# Patient Record
Sex: FEMALE | Race: WHITE | NOT HISPANIC OR LATINO | Employment: UNEMPLOYED | URBAN - METROPOLITAN AREA
[De-identification: names, ages, dates, MRNs, and addresses within clinical notes are randomized per-mention and may not be internally consistent; named-entity substitution may affect disease eponyms.]

---

## 2018-04-01 ENCOUNTER — TRANSFERRED RECORDS (OUTPATIENT)
Dept: HEALTH INFORMATION MANAGEMENT | Facility: CLINIC | Age: 43
End: 2018-04-01

## 2018-04-01 LAB — PAP SMEAR - HIM PATIENT REPORTED: POSITIVE

## 2018-04-16 NOTE — PROGRESS NOTES
INTERNAL MEDICINE  ASSESSMENT/PLAN:   1. Intermittent right upper quadrant abdominal pain  Her history sounds like biliary colic but it is so intermittent and over a stretch of 5 years that this makes it less likely.  Has history of alcoholism but had serology and ultrasound that were negative in the past.  Will review past records, ask patient to keep food diary and consider further imaging of the right upper quadrant if needed after review of records.      2. Encounter for screening mammogram for breast cancer    - Hepatitis B Surface Antibody  - TDAP VACCINE (ADACEL)  - MA Screen Bilateral w/Maicol; Future    3. Biliary sludge determined by ultrasound  Per above     4. Encounter for contraceptive management, unspecified type  Doesn't want hormones and will check in to copper IUD    Patient Instructions  Consider getting a copper IUD.    Write down what foods you ate before a flare up.    I'll review your [facility] records.    Schedule your mammogram.    I'll get back to your about whether you need a bone density due to the Depo use.        SUBJECTIVE:   Shala Morales is a 43 year old female who presents to clinic today for the following health issues:    Patient presents to clinic today to establish care.    Gallbladder - Her gallbladder has been flaring up intermittently since 2013. Around that time, she had an US and consult with a surgeon. They told her there was sludge in her gallbladder and that she could decide whether to get a cholecystectomy or wait until it was more urgent. Since then, it seemed like her symptoms worsened with her monthly periods. It has been 4-5 months since she last had a flare.     Her symptoms present over a period of days like a stomachache which worsens and radiates to under her ribs. It usually occurs later in the day and will wake her up at night, making it difficult for her to find a comfortable position to lay down. Describes pain as burning and has abdominal tenderness. Notes  she has had shoulder pain.    She cut out fat from her diet and has been eating a vegan diet. Drinking mochas seemed to cause flares but overall she cannot find any correlation between specific foods or quantities. When she has flares, her bowel movements and urination are unchanged in color or consistency. Her pain has never made her vomit.     Anemia - She has always been slightly anemic. In January, her provider started her on Vitron C 65mg twice daily because she was feeling fatigued. She did not like taking it twice daily so she decreased to once daily in February.     Perimenopause - Her mother was perimenopause in her 40s and patient has gotten hot flashes and missed her period for a 3 month period. Denies pregnancy. Her periods progressed from short and heavy, to short. Her LMP was last month. She was on Depo Provera for 10 years.      HPV - Last week, she had a LEEP procedure done at [facility] which came back negative. She has had abnormal biopsies in the past.     Hypertension - She had high blood pressure when she was an alcoholic. Got her liver tested and there was no liver damage. She has been sober for 6 years and has not taken anything for her blood pressure since.     Depression - She has a history of depression and was on antidepressants but it is well-controlled now. Has not been on Celexa for 5 years.    Additional notes:   Family history of prostate cancer  Works as an LPN at [employer]      Problem list and histories reviewed & adjusted, as indicated.  Additional history: as documented    Labs reviewed in EPIC    Reviewed and updated as needed this visit by clinical staff  Tobacco  Allergies  Meds  Med Hx  Surg Hx  Fam Hx  Soc Hx      Reviewed and updated as needed this visit by Provider         ROS:  CONSTITUTIONAL: NEGATIVE for fever, chills, change in weight  GI: NEGATIVE for nausea, abdominal pain, heartburn, or change in bowel habits  : NEGATIVE for frequency, dysuria, or  "hematuria  MUSCULOSKELETAL: NEGATIVE for significant arthralgias or myalgia  NEURO: NEGATIVE for weakness, dizziness or paresthesias  PSYCHIATRIC: NEGATIVE for changes in mood or affect    This document serves as a record of the services and decisions personally performed and made by Gwen Lewis MD. It was created on his/her behalf by giovani Bo medical scribe. The creation of this document is based the provider's statements to the medical scribes.    Shavonne Rebollar 3:19 PM, April 17, 2018  OBJECTIVE:   /78  Pulse 71  Temp 98.4  F (36.9  C) (Oral)  Resp 17  Ht 1.74 m (5' 8.5\")  Wt 71.4 kg (157 lb 8 oz)  SpO2 100%  BMI 23.6 kg/m2  Body mass index is 23.6 kg/(m^2).  GENERAL: healthy, alert and no distress  NECK: no adenopathy, no asymmetry, masses, or scars and thyroid normal to palpation  RESP: lungs clear to auscultation - no rales, rhonchi or wheezes  CV: regular rate and rhythm, normal S1 S2, no S3 or S4, no murmur, click or rub, no peripheral edema and peripheral pulses strong  ABDOMEN: soft, nontender, no hepatosplenomegaly, no masses and bowel sounds normal  NEURO: Normal strength and tone, mentation intact and speech normal  PSYCH: mentation appears normal, affect normal/bright    Diagnostic Test Results:  No results found for this or any previous visit.      I spent 38 minutes of time with the patient and >50% of it was in education and counseling regarding gallbladder.    The information in this document, created by the medical scribe, Eyad Rebollar, for me, accurately reflects the services I personally performed and the decisions made by me. I have reviewed and approved this document for accuracy prior to leaving the patient care area.    Gwen Lewis MD  [facility]    Start 3:18 PM  End 3:56 PM  "

## 2018-04-17 ENCOUNTER — OFFICE VISIT (OUTPATIENT)
Dept: INTERNAL MEDICINE | Facility: CLINIC | Age: 43
End: 2018-04-17
Payer: COMMERCIAL

## 2018-04-17 VITALS
HEART RATE: 71 BPM | HEIGHT: 69 IN | SYSTOLIC BLOOD PRESSURE: 122 MMHG | TEMPERATURE: 98.4 F | BODY MASS INDEX: 23.33 KG/M2 | WEIGHT: 157.5 LBS | DIASTOLIC BLOOD PRESSURE: 78 MMHG | OXYGEN SATURATION: 100 % | RESPIRATION RATE: 17 BRPM

## 2018-04-17 DIAGNOSIS — K83.8 BILIARY SLUDGE DETERMINED BY ULTRASOUND: ICD-10-CM

## 2018-04-17 DIAGNOSIS — R10.11 INTERMITTENT RIGHT UPPER QUADRANT ABDOMINAL PAIN: Primary | ICD-10-CM

## 2018-04-17 DIAGNOSIS — Z23 NEED FOR TDAP VACCINATION: ICD-10-CM

## 2018-04-17 DIAGNOSIS — Z30.9 ENCOUNTER FOR CONTRACEPTIVE MANAGEMENT, UNSPECIFIED TYPE: ICD-10-CM

## 2018-04-17 DIAGNOSIS — Z12.31 ENCOUNTER FOR SCREENING MAMMOGRAM FOR BREAST CANCER: ICD-10-CM

## 2018-04-17 DIAGNOSIS — Z78.9 HEPATITIS B VACCINATION STATUS UNKNOWN: ICD-10-CM

## 2018-04-17 PROBLEM — Z86.59 HISTORY OF DEPRESSION: Status: ACTIVE | Noted: 2018-04-17

## 2018-04-17 PROBLEM — B97.7 HPV IN FEMALE: Status: ACTIVE | Noted: 2018-04-17

## 2018-04-17 PROBLEM — Z86.79 HISTORY OF HYPERTENSION: Status: ACTIVE | Noted: 2018-04-17

## 2018-04-17 PROBLEM — F10.21 HISTORY OF ALCOHOLISM (H): Status: ACTIVE | Noted: 2018-04-17

## 2018-04-17 PROBLEM — N95.1 SYMPTOMATIC MENOPAUSAL OR FEMALE CLIMACTERIC STATES: Status: ACTIVE | Noted: 2018-04-17

## 2018-04-17 PROCEDURE — 90471 IMMUNIZATION ADMIN: CPT | Performed by: INTERNAL MEDICINE

## 2018-04-17 PROCEDURE — 86706 HEP B SURFACE ANTIBODY: CPT | Performed by: INTERNAL MEDICINE

## 2018-04-17 PROCEDURE — 99203 OFFICE O/P NEW LOW 30 MIN: CPT | Mod: 25 | Performed by: INTERNAL MEDICINE

## 2018-04-17 PROCEDURE — 36415 COLL VENOUS BLD VENIPUNCTURE: CPT | Performed by: INTERNAL MEDICINE

## 2018-04-17 PROCEDURE — 90715 TDAP VACCINE 7 YRS/> IM: CPT | Performed by: INTERNAL MEDICINE

## 2018-04-17 NOTE — NURSING NOTE
"Chief Complaint   Patient presents with     Establish Care     review gallbladder     Health Maintenance     Due for PHQ2, tetanus and advanced care planning       Initial /78  Pulse 71  Temp 98.4  F (36.9  C) (Oral)  Resp 17  Ht 5' 8.5\" (1.74 m)  Wt 157 lb 8 oz (71.4 kg)  SpO2 100%  BMI 23.6 kg/m2 Estimated body mass index is 23.6 kg/(m^2) as calculated from the following:    Height as of this encounter: 5' 8.5\" (1.74 m).    Weight as of this encounter: 157 lb 8 oz (71.4 kg).  Medication Reconciliation: samy Wang.       "

## 2018-04-17 NOTE — MR AVS SNAPSHOT
After Visit Summary   4/17/2018    Shala Morales    MRN: [id number]           Patient Information     Date Of Birth          1975        Visit Information        Provider Department      4/17/2018 3:00 PM Gwen Lewis MD [facility]        Today's Diagnoses     Intermittent right upper quadrant abdominal pain    -  1    Encounter for screening mammogram for breast cancer        Biliary sludge determined by ultrasound        Encounter for contraceptive management, unspecified type          Care Instructions    Consider getting a copper IUD.    Write down what foods you ate before a flare up.    I'll review [facility] records.    Schedule your mammogram.    I'll get back to your about whether you need a bone density due to the Depo use.      [facility]    If you have any questions regarding to your visit please contact your care team:     Team Pink:   Clinic Hours Telephone Number   Internal Medicine:  Dr. Gwen Mohr, NP       7am-7pm  Monday - Thursday   7am-5pm  Fridays  [phone]  (Appointment scheduling available 24/7)    Questions about your visit?  Team Line  [phone]   Urgent Care - [place] and [place] - 11am-9pm Monday-Friday Saturday-Sunday- 9am-5pm   [place] - 5pm-9pm Monday-Friday Saturday-Sunday- 9am-5pm  [phone] - [place]   [phone] - [place]       What options do I have for visits at the clinic other than the traditional office visit?  To expand how we care for you, many of our providers are utilizing electronic visits (e-visits) and telephone visits, when medically appropriate, for interactions with their patients rather than a visit in the clinic.   We also offer nurse visits for many medical concerns. Just like any other service, we will bill your insurance company for this type of visit based on time spent on the phone with your provider. Not all insurance  companies cover these visits. Please check with your medical insurance if this type of visit is covered. You will be responsible for any charges that are not paid by your insurance.      E-visits via Templafyhart:  generally incur a $35.00 fee.  Telephone visits:  Time spent on the phone: *charged based on time that is spent on the phone in increments of 10 minutes. Estimated cost:   5-10 mins $30.00   11-20 mins. $59.00   21-30 mins. $85.00   Use Leatt (secure email communication and access to your chart) to send your primary care provider a message or make an appointment. Ask someone on your Team how to sign up for Leatt.    For a Price Quote for your services, please call our [employer] Line at [phone].    As always, Thank you for trusting us with your health care needs!      Discharged by: Kathleen.             Follow-ups after your visit        Future tests that were ordered for you today     Open Future Orders        Priority Expected Expires Ordered    MA Screen Bilateral w/Maicol Routine  4/17/2019 4/17/2018            Who to contact     If you have questions or need follow up information about today's clinic visit or your schedule please contact [facility] directly at [phone].  Normal or non-critical lab and imaging results will be communicated to you by Templafyhart, letter or phone within 4 business days after the clinic has received the results. If you do not hear from us within 7 days, please contact the clinic through Templafyhart or phone. If you have a critical or abnormal lab result, we will notify you by phone as soon as possible.  Submit refill requests through Leatt or call your pharmacy and they will forward the refill request to us. Please allow 3 business days for your refill to be completed.          Additional Information About Your Visit        Leatt Information     Leatt lets you send messages to your doctor, view your test results, renew your prescriptions, schedule  "appointments and more. To sign up, go to [email or web address] . Click on \"Log in\" on the left side of the screen, which will take you to the Welcome page. Then click on \"Sign up Now\" on the right side of the page.     You will be asked to enter the access code listed below, as well as some personal information. Please follow the directions to create your username and password.     Your access code is: [email or web address]  Expires: 2018  3:57 PM     Your access code will  in 90 days. If you need help or a new code, please call your [facility] clinic or [phone].        Care EveryWhere ID     This is your Care EveryWhere ID. This could be used by other organizations to access your [facility] medical records  [id number]        Your Vitals Were     Pulse Temperature Respirations Height Pulse Oximetry BMI (Body Mass Index)    71 98.4  F (36.9  C) (Oral) 17 5' 8.5\" (1.74 m) 100% 23.6 kg/m2       Blood Pressure from Last 3 Encounters:   18 122/78   12 115/70    Weight from Last 3 Encounters:   18 157 lb 8 oz (71.4 kg)   12 165 lb (74.8 kg)              We Performed the Following     Hepatitis B Surface Antibody     TDAP VACCINE (ADACEL)          Today's Medication Changes          These changes are accurate as of 18  3:57 PM.  If you have any questions, ask your nurse or doctor.               Stop taking these medicines if you haven't already. Please contact your care team if you have questions.     celeXA 20 MG tablet   Generic drug:  citalopram   Stopped by:  Gwen Lewis MD           VITRON-C PO   Stopped by:  Gwen Lewis MD                    Primary Care Provider Fax #    Provider Not In System [phone]                Equal Access to Services     ILEANA STRINGER : Lisa Emery, triston guerrero, kian appiah. So [facility] [phone].    ATENCIÓN: Si cristina yun, tiene a elmore disposición servicios " jan de asistencia lingüística. Williams caldera [phone].    We comply with applicable federal civil rights laws and [place] laws. We do not discriminate on the basis of race, color, national origin, age, disability, sex, sexual orientation, or gender identity.            Thank you!     Thank you for choosing [facility]  for your care. Our goal is always to provide you with excellent care. Hearing back from our patients is one way we can continue to improve our services. Please take a few minutes to complete the written survey that you may receive in the mail after your visit with us. Thank you!             Your Updated Medication List - Protect others around you: Learn how to safely use, store and throw away your medicines at www.disposemymeds.org.      Notice  As of 4/17/2018  3:57 PM    You have not been prescribed any medications.

## 2018-04-17 NOTE — PATIENT INSTRUCTIONS
Consider getting a copper IUD.    Write down what foods you ate before a flare up.    I'll review your [facility] records.    Schedule your mammogram.    I'll get back to your about whether you need a bone density due to the Depo use.      [facility]    If you have any questions regarding to your visit please contact your care team:     Team Pink:   Clinic Hours Telephone Number   Internal Medicine:  Dr. Gwen Mohr, NP       7am-7pm  Monday - Thursday   7am-5pm  Fridays  [phone]  (Appointment scheduling available 24/7)    Questions about your visit?  Team Line  [phone]   Urgent Care - [place] and [place] - 11am-9pm Monday-Friday Saturday-Sunday- 9am-5pm   [place] - 5pm-9pm Monday-Friday Saturday-Sunday- 9am-5pm  [phone] - [place]   [phone] - [place]       What options do I have for visits at the clinic other than the traditional office visit?  To expand how we care for you, many of our providers are utilizing electronic visits (e-visits) and telephone visits, when medically appropriate, for interactions with their patients rather than a visit in the clinic.   We also offer nurse visits for many medical concerns. Just like any other service, we will bill your insurance company for this type of visit based on time spent on the phone with your provider. Not all insurance companies cover these visits. Please check with your medical insurance if this type of visit is covered. You will be responsible for any charges that are not paid by your insurance.      E-visits via [employer]:  generally incur a $35.00 fee.  Telephone visits:  Time spent on the phone: *charged based on time that is spent on the phone in increments of 10 minutes. Estimated cost:   5-10 mins $30.00   11-20 mins. $59.00   21-30 mins. $85.00   Use [employer] (secure email communication and access to your chart) to send your primary care provider a message or make  an appointment. Ask someone on your Team how to sign up for [employer]erot.    For a Price Quote for your services, please call our Consumer Price Line at [phone].    As always, Thank you for trusting us with your health care needs!      Discharged by: Kathleen.

## 2018-04-18 LAB — HBV SURFACE AB SERPL IA-ACNC: 1.46 M[IU]/ML

## 2018-04-19 NOTE — PROGRESS NOTES
Shala Shafer.  You do not have enough immunity against hepatitis B, despite your prior vaccination.  You should receive your booster immunization for Hepatitis B.  If your place of employment doesn't offer this, then I am happy to set this up for you at our clinic.  Just let me know,  Dr. Lewis

## 2018-04-22 ENCOUNTER — HEALTH MAINTENANCE LETTER (OUTPATIENT)
Age: 43
End: 2018-04-22

## 2018-04-22 ENCOUNTER — TELEPHONE (OUTPATIENT)
Dept: INTERNAL MEDICINE | Facility: CLINIC | Age: 43
End: 2018-04-22

## 2018-04-22 DIAGNOSIS — R10.11 ABDOMINAL PAIN, RIGHT UPPER QUADRANT: Primary | ICD-10-CM

## 2018-04-22 NOTE — TELEPHONE ENCOUNTER
Records from [facility] reviewed.    1. Her gallbladder ultrasound not only showed some sludge but also a thickened gallbladder wall.  That definitely points to a mild inflammation of the gallbladder.  I think her intermittent right upper quadrant pain is definitely her gallbladder.  If she gets another episode then I want her to have another ultrasound of the right upper quadrant (abd ultrasound limited - ok to place future order - indication right upper quadrant pain) and follow up with a general surgeon like Dr. Menjivar.    Dr. Lewis

## 2018-04-23 NOTE — TELEPHONE ENCOUNTER
Patient notified of providers message as written.  Patient verbalized understanding, no further questions or concerns.  Not having any symptoms now, she will call back to schedule if having symptoms.   Esthela Morataya RN

## 2018-04-23 NOTE — TELEPHONE ENCOUNTER
US order and referral placed    Left message on voicemail for patient to return call to RN hotline at # [phone].  Sobia Hemphill RN

## 2018-08-14 ENCOUNTER — RADIANT APPOINTMENT (OUTPATIENT)
Dept: ULTRASOUND IMAGING | Facility: CLINIC | Age: 43
End: 2018-08-14
Attending: INTERNAL MEDICINE
Payer: COMMERCIAL

## 2018-08-14 DIAGNOSIS — N28.89 RENAL MASS: Primary | ICD-10-CM

## 2018-08-14 DIAGNOSIS — R10.11 ABDOMINAL PAIN, RIGHT UPPER QUADRANT: ICD-10-CM

## 2018-08-14 PROCEDURE — 76705 ECHO EXAM OF ABDOMEN: CPT

## 2018-08-16 NOTE — PROGRESS NOTES
Shala Shafer.  There is a small growth in the right kidney.  It wasn't there in 2013.  It is likely benign but we can't be certain without an MRI for further evaluation.  I don't feel it has anything to do with the pain you have been having, however.  Dr. Lewis

## 2018-08-17 ENCOUNTER — TELEPHONE (OUTPATIENT)
Dept: INTERNAL MEDICINE | Facility: CLINIC | Age: 43
End: 2018-08-17

## 2018-08-17 NOTE — TELEPHONE ENCOUNTER
Voice mail left for patient to call RN hotline at [phone].  After leaving message noted that EZDOCTORhart message with result was sent and order has been placed. No need to call back if patient views message.  Will check back later    Notes Recorded by Gwen Lewis MD on 8/16/2018 at 9:13 PM  MRI order is now pending.  Ok to sign once patient is notified.  ------    Notes Recorded by Esthela Morataya RN on 8/16/2018 at 6:26 PM  Please clarify MRI- what order does she need?  Esthela Morataya RN    ------    Notes Recorded by Gwen Lewis MD on 8/15/2018 at 8:25 PM  Shala Shafer.  There is a small growth in the right kidney.  It wasn't there in 2013.  It is likely benign but we can't be certain without an MRI for further evaluation.  I don't feel it has anything to do with the pain you have been having, however.  Dr. Joshua Morataya RN

## 2018-08-17 NOTE — TELEPHONE ENCOUNTER
Spoke with patient and advised her of mychart note.  Patient will log into mychart to get referral phone numbers.  Patient verbalized understanding, no further questions or concerns.    Esthela Morataya RN

## 2018-08-28 ENCOUNTER — RADIANT APPOINTMENT (OUTPATIENT)
Dept: MRI IMAGING | Facility: CLINIC | Age: 43
End: 2018-08-28
Attending: INTERNAL MEDICINE
Payer: COMMERCIAL

## 2018-08-28 DIAGNOSIS — N28.89 RENAL MASS: ICD-10-CM

## 2018-08-28 PROCEDURE — 74183 MRI ABD W/O CNTR FLWD CNTR: CPT | Mod: TC

## 2018-08-28 PROCEDURE — A9585 GADOBUTROL INJECTION: HCPCS

## 2018-08-28 RX ORDER — GADOBUTROL 604.72 MG/ML
10 INJECTION INTRAVENOUS ONCE
Status: COMPLETED | OUTPATIENT
Start: 2018-08-28 | End: 2018-08-28

## 2018-08-28 RX ORDER — SODIUM CHLORIDE 9 MG/ML
INJECTION, SOLUTION INTRAVENOUS ONCE
Status: COMPLETED | OUTPATIENT
Start: 2018-08-28 | End: 2018-08-28

## 2018-08-28 RX ADMIN — SODIUM CHLORIDE 60 ML: 9 INJECTION, SOLUTION INTRAVENOUS at 18:49

## 2018-08-28 RX ADMIN — GADOBUTROL 10 ML: 604.72 INJECTION INTRAVENOUS at 18:48

## 2018-10-04 ENCOUNTER — TRANSFERRED RECORDS (OUTPATIENT)
Dept: HEALTH INFORMATION MANAGEMENT | Facility: CLINIC | Age: 43
End: 2018-10-04

## 2018-11-21 NOTE — PATIENT INSTRUCTIONS
Stop aspirin, ibuprofen, aleve, fish oil 7 days before surgery.   Do not take any meds on the morning of procedure.    Before Your Surgery      Call your surgeon if there is any change in your health. This includes signs of a cold or flu (such as a sore throat, runny nose, cough, rash or fever).    Do not smoke, drink alcohol or take over the counter medicine (unless your surgeon or primary care doctor tells you to) for the 24 hours before and after surgery.    If you take prescribed drugs: Follow your doctor s orders about which medicines to take and which to stop until after surgery.    Eating and drinking prior to surgery: follow the instructions from your surgeon    Take a shower or bath the night before surgery. Use the soap your surgeon gave you to gently clean your skin. If you do not have soap from your surgeon, use your regular soap. Do not shave or scrub the surgery site.  Wear clean pajamas and have clean sheets on your bed.     [facility]    If you have any questions regarding to your visit please contact your care team:     Team Pink:   Clinic Hours Telephone Number   Internal Medicine:  Dr. Gwen Mohr, NP 7am-7pm  Monday - Thursday   7am-5pm  Fridays  [phone]  (Appointment scheduling available 24/7)   Urgent Care - [place] and [place] - 11am-9pm Monday-Friday Saturday-Sunday- 9am-5pm   [place] - 5pm-9pm Monday-Friday Saturday-Sunday- 9am-5pm  [phone] - [place]  [phone] - [place]       What options do I have for a visit other than an office visit? We offer electronic visits (e-visits) and telephone visits, when medically appropriate.  Please check with your medical insurance to see if these types of visits are covered, as you will be responsible for any charges that are not paid by your insurance.      You can use [employer] (secure electronic communication) to access to your chart, send your primary care  provider a message, or make an appointment. Ask a team member how to get started.     For a price quote for your services, please call our Consumer Price Line at [phone] or our Imaging Cost estimation line at [phone] (for imaging tests).  Luda BHAKTA CMA

## 2018-11-21 NOTE — PROGRESS NOTES
[facility]  [address] [place] [address]  [place] [address]  [phone]  Dept: [phone]    PRE-OP EVALUATION:  Today's date: 2018    Shala Morales (: 1975) presents for pre-operative evaluation assessment as requested by Dr. Johnson.  She requires evaluation and anesthesia risk assessment prior to undergoing surgery/procedure for treatment of RUQ pain .    Fax number for surgical facility: [phone]  [facility])   Primary Physician: No Ref-Primary, Physician  Type of Anesthesia Anticipated: General    Patient has a Health Care Directive or Living Will:  NO    Preop Questions 2018   Who is doing your surgery? Dr. Johnson   What are you having done? EUS/ERCP   Date of Surgery/Procedure: 18   Facility or Hospital where procedure/surgery will be performed: [facility]   1.  Do you have a history of Heart attack, stroke, stent, coronary bypass surgery, or other heart surgery? No   2.  Do you ever have any pain or discomfort in your chest? No   3.  Do you have a history of  Heart Failure? No   4.   Are you troubled by shortness of breath when:  walking on a level surface, or up a slight hill, or at night? No   5.  Do you currently have a cold, bronchitis or other respiratory infection? No   6.  Do you have a cough, shortness of breath, or wheezing? No   7.  Do you sometimes get pains in the calves of your legs when you walk? No   8. Do you or anyone in your family have previous history of blood clots? No   9.  Do you or does anyone in your family have a serious bleeding problem such as prolonged bleeding following surgeries or cuts? No   10. Have you ever had problems with anemia or been told to take iron pills? YES - took iron pills, not currently having anemia.   11. Have you had any abnormal blood loss such as black, tarry or bloody stools, or abnormal vaginal bleeding? No   12. Have you ever had a blood transfusion? No   13. Have you or any of  your relatives ever had problems with anesthesia? YES - mother has post op nausea and vomiting   14. Do you have sleep apnea, excessive snoring or daytime drowsiness? No   15. Do you have any prosthetic heart valves? No   16. Do you have prosthetic joints? No   17. Is there any chance that you may be pregnant? No     Rita Mohr CNP     HPI:     HPI related to upcoming procedure: Patient has had intermittent RUQ pain for years.  She has had negative labs and CT scan and will undergo an EUS/ERCP to evaluate further.      See problem list for active medical problems.  Problems all longstanding and stable, except as noted/documented.  See ROS for pertinent symptoms related to these conditions.                                                                                                                                                          .    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     Priority: Medium     Symptomatic menopausal or female climacteric states 04/17/2018     Priority: Medium     Biliary sludge determined by ultrasound 04/17/2018     Priority: Medium     History of hypertension 04/17/2018     Priority: Medium     History of depression 04/17/2018     Priority: Medium     History of alcoholism (H) 04/17/2018     Priority: Medium     Intermittent right upper quadrant abdominal pain 04/17/2018     Priority: Medium      Past Medical History:   Diagnosis Date     History of alcoholism (H) 4/17/2018     History of depression 4/17/2018     HPV in female 4/17/2018     HTN (hypertension)      History reviewed. No pertinent surgical history.  Current Outpatient Prescriptions   Medication Sig Dispense Refill     Cyanocobalamin (VITAMIN B-12) 5000 MCG LOZG Take 5,000 mcg by mouth once a week       Multiple Vitamin (MULTI VITAMIN DAILY PO) Take 1 tablet by mouth daily       OTC products: None, except as noted above    No Known Allergies   Latex  "Allergy: NO    Social History   Substance Use Topics     Smoking status: Never Smoker     Smokeless tobacco: Never Used     Alcohol use Yes     History   Drug Use No       REVIEW OF SYSTEMS:   Constitutional, neuro, ENT, endocrine, pulmonary, cardiac, gastrointestinal, genitourinary, musculoskeletal, integument and psychiatric systems are negative, except as otherwise noted.    EXAM:   /68  Pulse 78  Temp 98.9  F (37.2  C) (Oral)  Resp 18  Ht 5' 8.5\" (1.74 m)  Wt 157 lb (71.2 kg)  LMP 11/22/2018  SpO2 100%  BMI 23.52 kg/m2    GENERAL APPEARANCE: healthy, alert and no distress     EYES: EOMI, PERRL     HENT: ear canals and TM's normal and nose and mouth without ulcers or lesions     NECK: no adenopathy, no asymmetry, masses, or scars and thyroid normal to palpation     RESP: lungs clear to auscultation - no rales, rhonchi or wheezes     CV: regular rates and rhythm, normal S1 S2, no S3 or S4 and no murmur, click or rub     ABDOMEN:  soft, nontender, no HSM or masses and bowel sounds normal     MS: extremities normal- no gross deformities noted, no evidence of inflammation in joints, FROM in all extremities.     SKIN: no suspicious lesions or rashes     NEURO: Normal strength and tone, sensory exam grossly normal, mentation intact and speech normal     PSYCH: mentation appears normal. and affect normal/bright     LYMPHATICS: No cervical adenopathy    DIAGNOSTICS:   EKG: Not indicated due to non-vascular surgery and low risk of event (age <65 and without cardiac risk factors)    CBC, CMP done 11/11/18 at [facility].        No results for input(s): HGB, PLT, INR, NA, POTASSIUM, CR, A1C in the last 25832 hours.     IMPRESSION:   Reason for surgery/procedure: RUQ pain  Diagnosis/reason for consult: Management of comorbid conditions and preoperative exam.      The proposed surgical procedure is considered LOW risk.    REVISED CARDIAC RISK INDEX  The patient has the following serious cardiovascular risks for " perioperative complications such as (MI, PE, VFib and 3  AV Block):  No serious cardiac risks  INTERPRETATION: 0 risks: Class I (very low risk - 0.4% complication rate)    The patient has the following additional risks for perioperative complications:  No identified additional risks      ICD-10-CM    1. Preop general physical exam Z01.818    2. Need for prophylactic vaccination and inoculation against influenza Z23 FLU VACCINE, SPLIT VIRUS, IM (QUADRIVALENT) [10964]- >3 YRS     Vaccine Administration, Initial [36999]       RECOMMENDATIONS:       Cardiovascular Risk  Performs 4 METs exercise without symptoms (Light housework (dusting, washing dishes) and Climb a flight of stairs) .       --Patient is on no chronic medications    APPROVAL GIVEN to proceed with proposed procedure, without further diagnostic evaluation       Signed Electronically by: MARCIE Max CNP    Copy of this evaluation report is provided to requesting physician.    [facility] Preop Guidelines    Revised Cardiac Risk Index

## 2018-11-26 ENCOUNTER — OFFICE VISIT (OUTPATIENT)
Dept: FAMILY MEDICINE | Facility: CLINIC | Age: 43
End: 2018-11-26
Payer: COMMERCIAL

## 2018-11-26 VITALS
TEMPERATURE: 98.9 F | HEIGHT: 69 IN | SYSTOLIC BLOOD PRESSURE: 120 MMHG | DIASTOLIC BLOOD PRESSURE: 68 MMHG | BODY MASS INDEX: 23.25 KG/M2 | HEART RATE: 78 BPM | RESPIRATION RATE: 18 BRPM | OXYGEN SATURATION: 100 % | WEIGHT: 157 LBS

## 2018-11-26 DIAGNOSIS — Z01.818 PREOP GENERAL PHYSICAL EXAM: Primary | ICD-10-CM

## 2018-11-26 DIAGNOSIS — Z23 NEED FOR PROPHYLACTIC VACCINATION AND INOCULATION AGAINST INFLUENZA: ICD-10-CM

## 2018-11-26 PROCEDURE — 90471 IMMUNIZATION ADMIN: CPT | Performed by: NURSE PRACTITIONER

## 2018-11-26 PROCEDURE — 90686 IIV4 VACC NO PRSV 0.5 ML IM: CPT | Performed by: NURSE PRACTITIONER

## 2018-11-26 PROCEDURE — 99214 OFFICE O/P EST MOD 30 MIN: CPT | Mod: 25 | Performed by: NURSE PRACTITIONER

## 2018-11-26 ASSESSMENT — PAIN SCALES - GENERAL: PAINLEVEL: NO PAIN (0)

## 2018-11-26 NOTE — PROGRESS NOTES

## 2018-12-11 ENCOUNTER — TRANSFERRED RECORDS (OUTPATIENT)
Dept: HEALTH INFORMATION MANAGEMENT | Facility: CLINIC | Age: 43
End: 2018-12-11

## 2019-03-29 ENCOUNTER — OFFICE VISIT (OUTPATIENT)
Dept: FAMILY MEDICINE | Facility: CLINIC | Age: 44
End: 2019-03-29
Payer: COMMERCIAL

## 2019-03-29 VITALS
HEIGHT: 69 IN | RESPIRATION RATE: 18 BRPM | OXYGEN SATURATION: 100 % | TEMPERATURE: 98.4 F | SYSTOLIC BLOOD PRESSURE: 120 MMHG | BODY MASS INDEX: 23.28 KG/M2 | WEIGHT: 157.2 LBS | HEART RATE: 78 BPM | DIASTOLIC BLOOD PRESSURE: 76 MMHG

## 2019-03-29 DIAGNOSIS — J06.9 VIRAL UPPER RESPIRATORY TRACT INFECTION WITH COUGH: Primary | ICD-10-CM

## 2019-03-29 PROCEDURE — 99213 OFFICE O/P EST LOW 20 MIN: CPT | Performed by: PHYSICIAN ASSISTANT

## 2019-03-29 RX ORDER — ALBUTEROL SULFATE 90 UG/1
2 AEROSOL, METERED RESPIRATORY (INHALATION) EVERY 6 HOURS
Qty: 8.5 G | Refills: 0 | Status: SHIPPED | OUTPATIENT
Start: 2019-03-29 | End: 2019-04-22

## 2019-03-29 RX ORDER — OXYMETAZOLINE HYDROCHLORIDE 0.05 G/100ML
2 SPRAY NASAL 2 TIMES DAILY
Qty: 15 ML | Refills: 0 | Status: SHIPPED | OUTPATIENT
Start: 2019-03-29 | End: 2019-04-22

## 2019-03-29 ASSESSMENT — MIFFLIN-ST. JEOR: SCORE: 1424.49

## 2019-03-29 NOTE — PROGRESS NOTES
"  SUBJECTIVE:   Shala Morales is a 44 year old female who presents to clinic today for the following health issues:      RESPIRATORY SYMPTOMS      Duration: 1 week    Description  nasal congestion and feeling of can't catch breath, sometimes in throat and sometimes in chest.    Severity: moderate    Accompanying signs and symptoms: None    History (predisposing factors):  tobacco abuse and exposure to smoke    Precipitating or alleviating factors: None    Therapies tried and outcome:  rest and fluids      Problem list and histories reviewed & adjusted, as indicated.  Additional history: as documented      ROS:  Constitutional, HEENT, cardiovascular, pulmonary, gi and gu systems are negative, except as otherwise noted.    OBJECTIVE:     /76   Pulse 78   Temp 98.4  F (36.9  C) (Oral)   Resp 18   Ht 1.74 m (5' 8.5\")   Wt 71.3 kg (157 lb 3.2 oz)   LMP 02/18/2019   SpO2 100%   BMI 23.55 kg/m    Body mass index is 23.55 kg/m .  GENERAL: healthy, alert and no distress  HENT: normal cephalic/atraumatic, ear canals and TM's normal, nasal mucosa edematous , rhinorrhea clear, oropharynx clear and oral mucous membranes moist  NECK: no adenopathy, no asymmetry, masses, or scars and thyroid normal to palpation  RESP: lungs clear to auscultation - no rales, rhonchi or wheezes  SKIN: no suspicious lesions or rashes    Diagnostic Test Results:  none     ASSESSMENT/PLAN:   1. Viral upper respiratory tract infection with cough  - oxymetazoline (AFRIN) 0.05 % nasal spray; Spray 2 sprays into both nostrils 2 times daily  Dispense: 15 mL; Refill: 0  - albuterol (PROAIR HFA/PROVENTIL HFA/VENTOLIN HFA) 108 (90 Base) MCG/ACT inhaler; Inhale 2 puffs into the lungs every 6 hours  Dispense: 8.5 g; Refill: 0    Use medication as directed.  Follow up if symptoms should persist, change or worsen.  Patient amenable to this follow up plan.     Zack Gomez PA-C  [facility]    "

## 2019-04-04 ENCOUNTER — ANCILLARY PROCEDURE (OUTPATIENT)
Dept: MAMMOGRAPHY | Facility: CLINIC | Age: 44
End: 2019-04-04
Attending: INTERNAL MEDICINE
Payer: COMMERCIAL

## 2019-04-04 DIAGNOSIS — Z12.31 VISIT FOR SCREENING MAMMOGRAM: ICD-10-CM

## 2019-04-04 PROCEDURE — 77063 BREAST TOMOSYNTHESIS BI: CPT | Mod: TC

## 2019-04-04 PROCEDURE — 77067 SCR MAMMO BI INCL CAD: CPT | Mod: TC

## 2019-04-12 ENCOUNTER — OFFICE VISIT (OUTPATIENT)
Dept: FAMILY MEDICINE | Facility: CLINIC | Age: 44
End: 2019-04-12
Payer: COMMERCIAL

## 2019-04-12 ENCOUNTER — ANCILLARY PROCEDURE (OUTPATIENT)
Dept: GENERAL RADIOLOGY | Facility: CLINIC | Age: 44
End: 2019-04-12
Attending: NURSE PRACTITIONER
Payer: COMMERCIAL

## 2019-04-12 VITALS
SYSTOLIC BLOOD PRESSURE: 136 MMHG | OXYGEN SATURATION: 100 % | RESPIRATION RATE: 20 BRPM | HEIGHT: 69 IN | TEMPERATURE: 98.4 F | HEART RATE: 65 BPM | BODY MASS INDEX: 23.34 KG/M2 | WEIGHT: 157.6 LBS | DIASTOLIC BLOOD PRESSURE: 62 MMHG

## 2019-04-12 DIAGNOSIS — L82.1 SEBORRHEIC KERATOSIS: Primary | ICD-10-CM

## 2019-04-12 DIAGNOSIS — R06.02 SHORTNESS OF BREATH: ICD-10-CM

## 2019-04-12 DIAGNOSIS — R09.A2 GLOBUS SENSATION: ICD-10-CM

## 2019-04-12 LAB
FEF 25/75: NORMAL
FEV-1: NORMAL
FEV1/FVC: NORMAL
FVC: NORMAL

## 2019-04-12 PROCEDURE — 71046 X-RAY EXAM CHEST 2 VIEWS: CPT

## 2019-04-12 PROCEDURE — 99214 OFFICE O/P EST MOD 30 MIN: CPT | Mod: 25 | Performed by: NURSE PRACTITIONER

## 2019-04-12 PROCEDURE — 94010 BREATHING CAPACITY TEST: CPT | Performed by: NURSE PRACTITIONER

## 2019-04-12 ASSESSMENT — PAIN SCALES - GENERAL: PAINLEVEL: NO PAIN (0)

## 2019-04-12 ASSESSMENT — MIFFLIN-ST. JEOR: SCORE: 1426.31

## 2019-04-12 NOTE — PROGRESS NOTES
SUBJECTIVE:   Shala Morales is a 44 year old female who presents to clinic today for the following   health issues:        Chief Complaint   Patient presents with     Breathing Problem     follow up from 3/29/19     Throat Problem     follow up     Patient notes a globus sensation to her throat, difficulty breathing in air and right pleuritic chest pain.  She denies fever, cough, wheezing.  Patient notes nasal congestion.  Patient denies night sweats, fever, lymphadenopathy.  Patient had EUS on .  Patient quit smoking in .  She used an albuterol inhaler without improvement.    Additional history: as documented    Reviewed  and updated as needed this visit by clinical staff         Reviewed and updated as needed this visit by Provider         Patient Active Problem List   Diagnosis     HPV in female     Symptomatic menopausal or female climacteric states     Biliary sludge determined by ultrasound     History of hypertension     History of depression     History of alcoholism (H)     Intermittent right upper quadrant abdominal pain     History reviewed. No pertinent surgical history.    Social History     Tobacco Use     Smoking status: Former Smoker     Packs/day: 1.50     Years: 15.00     Pack years: 22.50     Types: Cigarettes     Start date: 1994     Last attempt to quit: 2018     Years since quittin.3     Smokeless tobacco: Never Used   Substance Use Topics     Alcohol use: Yes     Family History   Problem Relation Age of Onset     Thyroid Disease Mother      Hypertension Mother      Diabetes Father      Hypertension Father      Thyroid Disease Maternal Grandmother      Hypertension Maternal Grandmother      Hypertension Maternal Grandfather      Hypertension Paternal Grandmother      Diabetes Paternal Grandfather      Prostate Cancer Paternal Grandfather      Hypertension Paternal Grandfather          Current Outpatient Medications   Medication Sig Dispense Refill     albuterol  "(PROAIR HFA/PROVENTIL HFA/VENTOLIN HFA) 108 (90 Base) MCG/ACT inhaler Inhale 2 puffs into the lungs every 6 hours 8.5 g 0     Cyanocobalamin (VITAMIN B-12) 5000 MCG LOZG Take 5,000 mcg by mouth once a week       Multiple Vitamin (MULTI VITAMIN DAILY PO) Take 1 tablet by mouth daily       oxymetazoline (AFRIN) 0.05 % nasal spray Spray 2 sprays into both nostrils 2 times daily (Patient not taking: Reported on 4/12/2019) 15 mL 0     No Known Allergies  BP Readings from Last 3 Encounters:   04/12/19 136/62   03/29/19 120/76   11/26/18 120/68    Wt Readings from Last 3 Encounters:   04/12/19 71.5 kg (157 lb 9.6 oz)   03/29/19 71.3 kg (157 lb 3.2 oz)   11/26/18 71.2 kg (157 lb)                  Labs reviewed in EPIC    ROS:  Constitutional, HEENT, cardiovascular, pulmonary, gi and gu systems are negative, except as otherwise noted.    OBJECTIVE:     /62   Pulse 65   Temp 98.4  F (36.9  C) (Oral)   Resp 20   Ht 1.74 m (5' 8.5\")   Wt 71.5 kg (157 lb 9.6 oz)   LMP 04/03/2019 (Approximate)   SpO2 100%   Breastfeeding? Unknown   BMI 23.61 kg/m    Body mass index is 23.61 kg/m .  GENERAL: healthy, alert and no distress  EYES: Eyes grossly normal to inspection, PERRL and conjunctivae and sclerae normal  HENT: ear canals and TM's normal, nose and mouth without ulcers or lesions  NECK: no adenopathy, no asymmetry, masses, or scars and thyroid normal to palpation  RESP: lungs clear to auscultation - no rales, rhonchi or wheezes  CV: regular rate and rhythm, normal S1 S2, no S3 or S4, no murmur, click or rub, no peripheral edema and peripheral pulses strong  MS: no gross musculoskeletal defects noted, no edema  Skin: seborrheic keratosis noted to upper back    Diagnostic Test Results:  pending    ASSESSMENT/PLAN:     1. Seborrheic keratosis  Patient reassured that these are benign.    2. Shortness of breath  Normal spirometry today.  Will start with CXR to evaluate.  - Spirometry, Breathing Capacity: Normal Order, " Clinic Performed  - XR Chest 2 Views; Future    3. Globus sensation    - OTOLARYNGOLOGY REFERRAL    FUTURE APPOINTMENTS:       - Follow-up for annual visit or as needed    MARCIE Max CNP  [facility]

## 2019-04-15 ENCOUNTER — TELEPHONE (OUTPATIENT)
Dept: INTERNAL MEDICINE | Facility: CLINIC | Age: 44
End: 2019-04-15

## 2019-04-15 DIAGNOSIS — R06.00 DYSPNEA: Primary | ICD-10-CM

## 2019-04-15 NOTE — TELEPHONE ENCOUNTER
Rita Mohr, MARCIE CNP  P Fz Rn Triage Pool             Please call patient-     Her CXR was normal.  If she continues to have shortness of breath, I would like her to get a Chest CT w contrast to evaluate further (indication dyspnea).     Thanks,   Rita Mohr, CNP

## 2019-04-15 NOTE — TELEPHONE ENCOUNTER
Left message on answering machine for patient to call back to the RN hotline at [phone].    Alma Banerjee RN  [facility]

## 2019-04-15 NOTE — TELEPHONE ENCOUNTER
Pt was given provider's message as written. States she continues to have SOB. CT order placed. Pt was provided the number for imaging.    Alma Banerjee RN  [facility]

## 2019-04-20 ASSESSMENT — ENCOUNTER SYMPTOMS
BREAST MASS: 0
SHORTNESS OF BREATH: 1
HEARTBURN: 1

## 2019-04-22 ENCOUNTER — OFFICE VISIT (OUTPATIENT)
Dept: INTERNAL MEDICINE | Facility: CLINIC | Age: 44
End: 2019-04-22
Payer: COMMERCIAL

## 2019-04-22 VITALS
TEMPERATURE: 98.7 F | SYSTOLIC BLOOD PRESSURE: 136 MMHG | OXYGEN SATURATION: 100 % | WEIGHT: 156.6 LBS | RESPIRATION RATE: 14 BRPM | HEART RATE: 69 BPM | HEIGHT: 69 IN | DIASTOLIC BLOOD PRESSURE: 88 MMHG | BODY MASS INDEX: 23.19 KG/M2

## 2019-04-22 DIAGNOSIS — E78.5 HYPERLIPIDEMIA LDL GOAL <130: ICD-10-CM

## 2019-04-22 DIAGNOSIS — J84.10 LUNG GRANULOMA (H): ICD-10-CM

## 2019-04-22 DIAGNOSIS — Z78.9 VEGAN DIET: ICD-10-CM

## 2019-04-22 DIAGNOSIS — F10.21 HISTORY OF ALCOHOLISM (H): ICD-10-CM

## 2019-04-22 DIAGNOSIS — Z00.00 ROUTINE GENERAL MEDICAL EXAMINATION AT A HEALTH CARE FACILITY: Primary | ICD-10-CM

## 2019-04-22 DIAGNOSIS — R13.12 OROPHARYNGEAL DYSPHAGIA: ICD-10-CM

## 2019-04-22 PROCEDURE — 80061 LIPID PANEL: CPT | Performed by: INTERNAL MEDICINE

## 2019-04-22 PROCEDURE — 82607 VITAMIN B-12: CPT | Performed by: INTERNAL MEDICINE

## 2019-04-22 PROCEDURE — 84443 ASSAY THYROID STIM HORMONE: CPT | Performed by: INTERNAL MEDICINE

## 2019-04-22 PROCEDURE — 99396 PREV VISIT EST AGE 40-64: CPT | Performed by: INTERNAL MEDICINE

## 2019-04-22 PROCEDURE — 36415 COLL VENOUS BLD VENIPUNCTURE: CPT | Performed by: INTERNAL MEDICINE

## 2019-04-22 PROCEDURE — 82728 ASSAY OF FERRITIN: CPT | Performed by: INTERNAL MEDICINE

## 2019-04-22 ASSESSMENT — ENCOUNTER SYMPTOMS
BREAST MASS: 0
HEARTBURN: 1
SHORTNESS OF BREATH: 1

## 2019-04-22 ASSESSMENT — MIFFLIN-ST. JEOR: SCORE: 1429.71

## 2019-04-22 ASSESSMENT — PAIN SCALES - GENERAL: PAINLEVEL: NO PAIN (0)

## 2019-04-22 NOTE — PATIENT INSTRUCTIONS
- Keep your appointment with ENT. If everything is normal, I would do a pulmonary function test.      Health Maintenance  - Would recommend doing the 3D mammogram every other year.    [facility]    If you have any questions regarding to your visit please contact your care team:     Team Pink:   Clinic Hours Telephone Number   Internal Medicine:  Dr. Gwen Mohr, NP 7am-7pm  Monday - Thursday   7am-5pm  Fridays  [phone]  (Appointment scheduling available 24/7)   Urgent Care - [place] [facility] - 11am-9pm Monday-Friday Saturday-Sunday- 9am-5pm   [place] - 5pm-9pm Monday-Friday Saturday-Sunday- 9am-5pm  [phone] - [place]  [phone] - [place]       What options do I have for a visit other than an office visit? We offer electronic visits (e-visits) and telephone visits, when medically appropriate.  Please check with your medical insurance to see if these types of visits are covered, as you will be responsible for any charges that are not paid by your insurance.      You can use F2G (secure electronic communication) to access to your chart, send your primary care provider a message, or make an appointment. Ask a team member how to get started.     For a price quote for your services, please call our Consumer Price Line at [phone] or our Imaging Cost estimation line at [phone] (for imaging tests).

## 2019-04-22 NOTE — PROGRESS NOTES
INTERNAL MEDICINE    SUBJECTIVE:   CC: Shala Morales is an 44 year old woman who presents for preventive health visit.     Healthy Habits:     Getting at least 3 servings of Calcium per day:  Yes    Bi-annual eye exam:  Yes    Dental care twice a year:  Yes    Sleep apnea or symptoms of sleep apnea:  None    Diet:  Vegetarian/vegan    Frequency of exercise:  4-5 days/week    Duration of exercise:  45-60 minutes    Taking medications regularly:  Yes    Medication side effects:  Not applicable    PHQ-2 Total Score: 2    Additional concerns today:  Yes    Chief Complaint   Patient presents with     Physical     pt reports having a leap procedure due to abnormal pap 5/18 at [facility]     Due for physical, pap, HIV screening     Ongoing shortness of breath  Late March 2019, she suddenly started having episodes of shortness of breath. Was seen by a provider and treated with a nasal spray and albuterol inhaler; no improvements in symptoms.  Was seen by Rita Mohr for recheck and recommended she see ENT. Since then, she has continued to have symptoms of shortness of breath and throat tightness. She describes shortness of breath like breathing through a tissue paper. Along with tightness in the throat, she also reports feelings of a lump in the right neck; this waxes and wanes. Feels like something is hanging in the throat; she can feel it more when she is swallowing, but can also feel it at rest. Symptoms are worse with waking in the AM. Also reports some dysphagia.     Other  - Ongoing right sided abdominal pain. Since our last visit, she reports no improvements in intensity of pain, however feels its duration is shorter. Was unsure if this could have been related to constipation. EUS was negative.    - Had an abnormal pap done 4/2018 by [facility]. Already has a follow-up scheduled already in 5/2019.      Today's PHQ-2 Score:   PHQ-2 ( 1999 [employer]) 4/20/2019   Q1: Little interest or pleasure  in doing things 1   Q2: Feeling down, depressed or hopeless 1   PHQ-2 Score 2   Q1: Little interest or pleasure in doing things Several days   Q2: Feeling down, depressed or hopeless Several days   PHQ-2 Score 2       Abuse: Current or Past(Physical, Sexual or Emotional)- No  Do you feel safe in your environment? Yes    Social History     Tobacco Use     Smoking status: Former Smoker     Packs/day: 1.50     Years: 15.00     Pack years: 22.50     Types: Cigarettes     Start date: 1994     Last attempt to quit: 2018     Years since quittin.3     Smokeless tobacco: Never Used   Substance Use Topics     Alcohol use: Yes     If you drink alcohol do you typically have >3 drinks per day or >7 drinks per week? No    No flowsheet data found.    Reviewed orders with patient.  Reviewed health maintenance and updated orders accordingly - Yes  Labs reviewed in [facility]    Mammogram Screening: Patient under age 50, mutual decision reflected in health maintenance.      Pertinent mammograms are reviewed under the imaging tab.  History of abnormal Pap smear: YES - Had an abnormal pap done 2018 by [facility]. Already has a follow-up scheduled already in 2019.     Reviewed and updated as needed this visit by clinical staff  Tobacco  Allergies  Meds  Med Hx  Surg Hx  Fam Hx  Soc Hx        Reviewed and updated as needed this visit by Provider          Review of Systems   Respiratory: Positive for shortness of breath.    Gastrointestinal: Positive for heartburn.   Breasts:  Negative for tenderness, breast mass and discharge.   Genitourinary: Negative for pelvic pain, vaginal bleeding and vaginal discharge.      This document serves as a record of the services and decisions personally performed and made by Gwen Lewis MD. It was created on her behalf by Chitra Tay, a trained medical scribe. The creation of this document is based the provider's statements to the medical scribe.    Scribcarina Tay 5:23 PM 2019     "  OBJECTIVE:   /88 (BP Location: Left arm, Cuff Size: Adult Regular)   Pulse 69   Temp 98.7  F (37.1  C) (Oral)   Resp 14   Ht 1.753 m (5' 9\")   Wt 71 kg (156 lb 9.6 oz)   LMP 04/03/2019 (Approximate)   SpO2 100%   Breastfeeding? No   BMI 23.13 kg/m    Physical Exam  GENERAL: healthy, alert and no distress  EYES: Eyes grossly normal to inspection, PERRL and conjunctivae and sclerae normal  HENT: ear canals and TM's normal, nose and mouth without ulcers or lesions  NECK: no adenopathy, no asymmetry, masses, or scars and thyroid normal to palpation  RESP: lungs clear to auscultation - no rales, rhonchi or wheezes  BREAST: normal without masses, tenderness or nipple discharge and no palpable axillary masses or adenopathy  CV: regular rate and rhythm, normal S1 S2, no S3 or S4, no murmur, click or rub, no peripheral edema and peripheral pulses strong  ABDOMEN: soft, nontender, no hepatosplenomegaly, no masses and bowel sounds normal  MS: no gross musculoskeletal defects noted, no edema  SKIN: no suspicious lesions or rashes  NEURO: Normal strength and tone, mentation intact and speech normal  PSYCH: mentation appears normal, affect normal/bright    Diagnostic Test Results:  No results found for this or any previous visit (from the past 24 hour(s)).    ASSESSMENT/PLAN:   1. Routine general medical examination at a health care facility      2. Lung granuloma (H)  Discussed with patient chest xray results.  Doesn't necessarily need a CT but may in the future.  Would do pft's and ent eval first.      3. Hyperlipidemia LDL goal <130    - Lipid panel reflex to direct LDL Non-fasting    4. Oropharyngeal dysphagia  Has ent eval pending   - TSH with free T4 reflex    5. Vegan diet    - Ferritin  - Vitamin B12    COUNSELING:  Reviewed preventive health counseling, as reflected in patient instructions  Special attention given to:        Regular exercise       Healthy diet/nutrition       Alcohol Use -  Patient is " "still in recovery       Smoking cessation - Patient has been smoke free for 4+ months.     Patient Instructions   - Keep your appointment with ENT. If everything is normal, I would do a pulmonary function test.      Health Maintenance  - Would recommend doing the 3D mammogram every other year.          BP Readings from Last 1 Encounters:   04/22/19 136/88     Estimated body mass index is 23.13 kg/m  as calculated from the following:    Height as of this encounter: 1.753 m (5' 9\").    Weight as of this encounter: 71 kg (156 lb 9.6 oz).   reports that she quit smoking about 4 months ago. Her smoking use included cigarettes. She started smoking about 25 years ago. She has a 22.50 pack-year smoking history. She has never used smokeless tobacco.      Counseling Resources:  ATP IV Guidelines  Pooled Cohorts Equation Calculator  Breast Cancer Risk Calculator  FRAX Risk Assessment  ICSI Preventive Guidelines  Dietary Guidelines for Americans, 2010  [email or web address]'s MyPlate  ASA Prophylaxis  Lung CA Screening    The information in this document, created by a scribe for me, accurately reflects the services I personally performed and the decisions made by me. I have reviewed and approved this document for accuracy.      Gwen Lewis MD  [facility]    Patient Instructions     - Keep your appointment with ENT. If everything is normal, I would do a pulmonary function test.      Health Maintenance  - Would recommend doing the 3D mammogram every other year.    [facility]    If you have any questions regarding to your visit please contact your care team:     Team Pink:   Clinic Hours Telephone Number   Internal Medicine:  Dr. Gwen Mohr NP 7am-7pm  Monday - Thursday   7am-5pm  Fridays  [phone]  (Appointment scheduling available 24/7)   [facility] - 11am-9pm Monday-Friday Saturday-Sunday- 9am-5pm   [place] - 5pm-9pm " Monday-Friday Saturday-Sunday- 9am-5pm  [phone] - [place]  [phone] - [place]       What options do I have for a visit other than an office visit? We offer electronic visits (e-visits) and telephone visits, when medically appropriate.  Please check with your medical insurance to see if these types of visits are covered, as you will be responsible for any charges that are not paid by your insurance.      You can use Olo (secure electronic communication) to access to your chart, send your primary care provider a message, or make an appointment. Ask a team member how to get started.     For a price quote for your services, please call our Consumer Price Line at [phone] or our Imaging Cost estimation line at [phone] (for imaging tests).

## 2019-04-23 PROBLEM — Z78.9 VEGAN DIET: Status: ACTIVE | Noted: 2019-04-23

## 2019-04-23 PROBLEM — E78.5 HYPERLIPIDEMIA LDL GOAL <130: Status: ACTIVE | Noted: 2019-04-23

## 2019-04-23 LAB
CHOLEST SERPL-MCNC: 137 MG/DL
FERRITIN SERPL-MCNC: 23 NG/ML (ref 12–150)
HDLC SERPL-MCNC: 55 MG/DL
LDLC SERPL CALC-MCNC: 60 MG/DL
NONHDLC SERPL-MCNC: 82 MG/DL
TRIGL SERPL-MCNC: 108 MG/DL
TSH SERPL DL<=0.005 MIU/L-ACNC: 1.36 MU/L (ref 0.4–4)
VIT B12 SERPL-MCNC: 431 PG/ML (ref 193–986)

## 2019-04-23 NOTE — RESULT ENCOUNTER NOTE
Normal vitamin B12 level. Normal thyroid.  Good cholesterol. Low end of normal for iron.  I recommend taking Vitron C 1 tab every other day.  This is over the counter.

## 2019-04-24 ENCOUNTER — OFFICE VISIT (OUTPATIENT)
Dept: OTOLARYNGOLOGY | Facility: CLINIC | Age: 44
End: 2019-04-24
Payer: COMMERCIAL

## 2019-04-24 ENCOUNTER — MYC MEDICAL ADVICE (OUTPATIENT)
Dept: INTERNAL MEDICINE | Facility: CLINIC | Age: 44
End: 2019-04-24

## 2019-04-24 VITALS
HEART RATE: 85 BPM | HEIGHT: 69 IN | OXYGEN SATURATION: 100 % | RESPIRATION RATE: 12 BRPM | BODY MASS INDEX: 22.96 KG/M2 | WEIGHT: 155 LBS | DIASTOLIC BLOOD PRESSURE: 87 MMHG | SYSTOLIC BLOOD PRESSURE: 135 MMHG

## 2019-04-24 DIAGNOSIS — R06.89 DIFFICULTY BREATHING: Primary | ICD-10-CM

## 2019-04-24 DIAGNOSIS — R09.A2 GLOBUS SENSATION: Primary | ICD-10-CM

## 2019-04-24 PROCEDURE — 31575 DIAGNOSTIC LARYNGOSCOPY: CPT | Performed by: OTOLARYNGOLOGY

## 2019-04-24 PROCEDURE — 99204 OFFICE O/P NEW MOD 45 MIN: CPT | Mod: 25 | Performed by: OTOLARYNGOLOGY

## 2019-04-24 ASSESSMENT — MIFFLIN-ST. JEOR: SCORE: 1414.52

## 2019-04-24 NOTE — PATIENT INSTRUCTIONS
General Scheduling Information  To schedule your CT/MRI scan, please contact [place] [facility] at [phone]   [address] [place] [address]    To schedule your Surgery, please contact our Specialty Schedulers at [phone]    ENT Clinic Locations Clinic Hours Telephone Number     [facility]  [address] [place] [address]   Tuesday:       8:00am -- 4:00pm    Wednesday:  8:00am - 4:00pm   To schedule an appointment with   Dr. Norris,   please contact our   Specialty Scheduling Department at:     [phone]       [facility]avez  [address] [place] [address]   Friday:          8:00am - 4:00pm         Urgent Care Locations Clinic Hours Telephone Numbers     [facility]  [address] [place] [address]     Monday-Friday:     11:00pm - 9:00pm    Saturday-Sunday:  9:00am - 5:00pm   [phone]     [facility] Chavez  [address] [place] [address]     Monday-Friday:      5:00pm - 9:00pm     Saturday-Sunday:  9:00am - 5:00pm   [phone]

## 2019-04-24 NOTE — PROGRESS NOTES
Chief Complaint - foreign body sensation in throat    History of Present Illness - Shala Morales is a 44 year old female who presents with a history of feeling like something is in the back of the throat since 1 month. Feels something is back there, harder to swallow, and sometimes feels breathing is restricted. They also noted frequent throat clearing in the beginning. She doesn't bring up a lot of phlegm. No cough. Voicing has seemed normal. They note a history of relux. She sometimes has burning in throat. It can happen once a week. Sometimes she has burning in nose. Normal spirometry. Normal TSH. Quit smoking a few months ago. She did smoke 1 ppd, but last 5 years about 4 cigs per day.    Past Medical History -   Patient Active Problem List   Diagnosis     HPV in female     Symptomatic menopausal or female climacteric states     Biliary sludge determined by ultrasound     History of hypertension     History of depression     History of alcoholism (H)     Intermittent right upper quadrant abdominal pain     Lung granuloma (H)     Vegan diet     Hyperlipidemia LDL goal <130       Current Medications -   Current Outpatient Medications:      Cyanocobalamin (VITAMIN B-12) 5000 MCG LOZG, Take 5,000 mcg by mouth once a week, Disp: , Rfl:      Multiple Vitamin (MULTI VITAMIN DAILY PO), Take 1 tablet by mouth daily, Disp: , Rfl:     Allergies - No Known Allergies    Social History -   Social History     Socioeconomic History     Marital status:      Spouse name: Not on file     Number of children: Not on file     Years of education: Not on file     Highest education level: Not on file   Occupational History     Not on file   Social Needs     Financial resource strain: Not on file     Food insecurity:     Worry: Not on file     Inability: Not on file     Transportation needs:     Medical: Not on file     Non-medical: Not on file   Tobacco Use     Smoking status: Former Smoker     Packs/day: 1.50     Years: 15.00  "    Pack years: 22.50     Types: Cigarettes     Start date: 1994     Last attempt to quit: 2018     Years since quittin.3     Smokeless tobacco: Never Used   Substance and Sexual Activity     Alcohol use: Yes     Drug use: No     Sexual activity: Yes   Lifestyle     Physical activity:     Days per week: Not on file     Minutes per session: Not on file     Stress: Not on file   Relationships     Social connections:     Talks on phone: Not on file     Gets together: Not on file     Attends Voodoo service: Not on file     Active member of club or organization: Not on file     Attends meetings of clubs or organizations: Not on file     Relationship status: Not on file     Intimate partner violence:     Fear of current or ex partner: Not on file     Emotionally abused: Not on file     Physically abused: Not on file     Forced sexual activity: Not on file   Other Topics Concern     Parent/sibling w/ CABG, MI or angioplasty before 65F 55M? Not Asked   Social History Narrative     Not on file       Family History -   Family History   Problem Relation Age of Onset     Thyroid Disease Mother      Hypertension Mother      Diabetes Father      Hypertension Father      Thyroid Disease Maternal Grandmother      Hypertension Maternal Grandmother      Hypertension Maternal Grandfather      Hypertension Paternal Grandmother      Diabetes Paternal Grandfather      Prostate Cancer Paternal Grandfather      Hypertension Paternal Grandfather        Review of Systems - As per HPI and PMHx, otherwise 10+ comprehensive system review is negative.    Physical Exam  /87   Pulse 85   Resp 12   Ht 1.74 m (5' 8.5\")   Wt 70.3 kg (155 lb)   LMP 2019 (Approximate)   SpO2 100%   BMI 23.22 kg/m    General - The patient is in no distress. Alert and oriented to person and place, answers questions and cooperates with examination appropriately.   Voice and Breathing - The patient was breathing comfortably without the " use of accessory muscles. There was no wheezing, stridor, or stertor.  The patients voice was clear and strong.  Eyes - Extraocular movements intact.  Sclera were not icteric or injected, conjunctiva were pink and moist.  Mouth - Examination of the oral cavity showed pink, healthy oral mucosa. No lesions or ulcerations noted.  The tongue was mobile and midline.  Throat - The walls of the oropharynx were smooth, symmetric, and had no lesions or ulcerations.  The tonsillar pillars and soft palate were symmetric.  The uvula was midline on elevation. Tonsils 1+, quiet.   Nose - External contour is symmetric, no gross deflection or scars.  Nasal mucosa is pink and moist with no abnormal mucus.  The septum was midline and non-obstructive, turbinates of normal size and position.  No polyps, masses, or purulence noted on examination.  Neck -  Palpation of the occipital, submental, submandibular, internal jugular chain, and supraclavicular nodes did not demonstrate any abnormal lymph nodes or masses. No parotid masses. Palpation of the thyroid was soft and smooth, with no nodules or goiter appreciated.  The trachea was mobile and midline.  Neurologic - CN II-XII are grossly intact, no focal neurologic deficits.   Cardiovascular - carotid pulses are 2+ bilaterally, regular rhythm    Procedure: Flexible Endoscopy  Indication: Globus Sensation    To best visualize the upper airway anatomy and due to the chief complaint and HPI, I proceeded with a fiberoptic examination. Color photographs were taken for the medical record. First I sprayed the left side of the nose with a mixture of lidocaine and neosynephrine.  I then passed the scope through the nasal cavity.  The nasal cavity was unremarkable.  The nasopharynx was mucosally covered and symmetric.  The Eustachian tube openings were unobstructed.  Going further down I had a clear view of the base of tongue which had normal appearing lingual tonsillar tissue.  The base of tongue  was free of lesions, masses, and the vallecula was open.  The epiglottis was smooth and mucosally covered.  The supraglottic larynx was then clearly visualized.  It appeared mostly normal may be slight edema but no significant erythema.  The vocal cords moved smoothly and symmetrically, they were pearly white and no lesions were seen.  The pyriform sinuses were open, and the limited view of the postcricoid region did not show any lesions.      A/P - Shala Morales is a 44 year old female with globus sensation but no evidence of infection, inflammation, or neoplasm on exam to explain the symptoms.  I explained to her that the 2 most likely causes are laryngopharyngeal reflux and/or anxiety.  She should try an H2 blocker like Pepcid or Zantac.  She should also try some lifestyle changes and we discussed these.  We discussed warning symptoms and reasons to come back including dysphagia, odynophagia, hoarseness, worsening pain or symptoms, hemoptysis.  She understands.    Adult lifestyle changes to prevent LPR reviewed      Avoid eating and drinking within two to three hours prior to bedtime    Do not drink alcohol    Eat small meals and slowly    Limit problem foods:    o Caffeine  o Carbonated drinks  o Chocolate  o Peppermint  o Tomato  o Citrus fruits  o Fatty and fried foods      Lose weight    Quit smoking    Wear loose clothing      Dr. Bob Norris  Otolaryngology  [facility]

## 2019-04-24 NOTE — LETTER
4/24/2019         RE: Shala Morales  [address]  [place] [address]        Dear Colleague,    Thank you for referring your patient, Shala Morales, to the [facility]. Please see a copy of my visit note below.    Chief Complaint - foreign body sensation in throat    History of Present Illness - Shala Morales is a 44 year old female who presents with a history of feeling like something is in the back of the throat since 1 month. Feels something is back there, harder to swallow, and sometimes feels breathing is restricted. They also noted frequent throat clearing in the beginning. She doesn't bring up a lot of phlegm. No cough. Voicing has seemed normal. They note a history of relux. She sometimes has burning in throat. It can happen once a week. Sometimes she has burning in nose. Normal spirometry. Normal TSH. Quit smoking a few months ago. She did smoke 1 ppd, but last 5 years about 4 cigs per day.    Past Medical History -   Patient Active Problem List   Diagnosis     HPV in female     Symptomatic menopausal or female climacteric states     Biliary sludge determined by ultrasound     History of hypertension     History of depression     History of alcoholism (H)     Intermittent right upper quadrant abdominal pain     Lung granuloma (H)     Vegan diet     Hyperlipidemia LDL goal <130       Current Medications -   Current Outpatient Medications:      Cyanocobalamin (VITAMIN B-12) 5000 MCG LOZG, Take 5,000 mcg by mouth once a week, Disp: , Rfl:      Multiple Vitamin (MULTI VITAMIN DAILY PO), Take 1 tablet by mouth daily, Disp: , Rfl:     Allergies - No Known Allergies    Social History -   Social History     Socioeconomic History     Marital status:      Spouse name: Not on file     Number of children: Not on file     Years of education: Not on file     Highest education level: Not on file   Occupational History     Not on file   Social Needs     Financial resource  "strain: Not on file     Food insecurity:     Worry: Not on file     Inability: Not on file     Transportation needs:     Medical: Not on file     Non-medical: Not on file   Tobacco Use     Smoking status: Former Smoker     Packs/day: 1.50     Years: 15.00     Pack years: 22.50     Types: Cigarettes     Start date: 1994     Last attempt to quit: 2018     Years since quittin.3     Smokeless tobacco: Never Used   Substance and Sexual Activity     Alcohol use: Yes     Drug use: No     Sexual activity: Yes   Lifestyle     Physical activity:     Days per week: Not on file     Minutes per session: Not on file     Stress: Not on file   Relationships     Social connections:     Talks on phone: Not on file     Gets together: Not on file     Attends Buddhist service: Not on file     Active member of club or organization: Not on file     Attends meetings of clubs or organizations: Not on file     Relationship status: Not on file     Intimate partner violence:     Fear of current or ex partner: Not on file     Emotionally abused: Not on file     Physically abused: Not on file     Forced sexual activity: Not on file   Other Topics Concern     Parent/sibling w/ CABG, MI or angioplasty before 65F 55M? Not Asked   Social History Narrative     Not on file       Family History -   Family History   Problem Relation Age of Onset     Thyroid Disease Mother      Hypertension Mother      Diabetes Father      Hypertension Father      Thyroid Disease Maternal Grandmother      Hypertension Maternal Grandmother      Hypertension Maternal Grandfather      Hypertension Paternal Grandmother      Diabetes Paternal Grandfather      Prostate Cancer Paternal Grandfather      Hypertension Paternal Grandfather        Review of Systems - As per HPI and PMHx, otherwise 10+ comprehensive system review is negative.    Physical Exam  /87   Pulse 85   Resp 12   Ht 1.74 m (5' 8.5\")   Wt 70.3 kg (155 lb)   LMP 2019 (Approximate)  "  SpO2 100%   BMI 23.22 kg/m     General - The patient is in no distress. Alert and oriented to person and place, answers questions and cooperates with examination appropriately.   Voice and Breathing - The patient was breathing comfortably without the use of accessory muscles. There was no wheezing, stridor, or stertor.  The patients voice was clear and strong.  Eyes - Extraocular movements intact.  Sclera were not icteric or injected, conjunctiva were pink and moist.  Mouth - Examination of the oral cavity showed pink, healthy oral mucosa. No lesions or ulcerations noted.  The tongue was mobile and midline.  Throat - The walls of the oropharynx were smooth, symmetric, and had no lesions or ulcerations.  The tonsillar pillars and soft palate were symmetric.  The uvula was midline on elevation. Tonsils 1+, quiet.   Nose - External contour is symmetric, no gross deflection or scars.  Nasal mucosa is pink and moist with no abnormal mucus.  The septum was midline and non-obstructive, turbinates of normal size and position.  No polyps, masses, or purulence noted on examination.  Neck -  Palpation of the occipital, submental, submandibular, internal jugular chain, and supraclavicular nodes did not demonstrate any abnormal lymph nodes or masses. No parotid masses. Palpation of the thyroid was soft and smooth, with no nodules or goiter appreciated.  The trachea was mobile and midline.  Neurologic - CN II-XII are grossly intact, no focal neurologic deficits.   Cardiovascular - carotid pulses are 2+ bilaterally, regular rhythm    Procedure: Flexible Endoscopy  Indication: Globus Sensation    To best visualize the upper airway anatomy and due to the chief complaint and HPI, I proceeded with a fiberoptic examination. Color photographs were taken for the medical record. First I sprayed the left side of the nose with a mixture of lidocaine and neosynephrine.  I then passed the scope through the nasal cavity.  The nasal cavity  was unremarkable.  The nasopharynx was mucosally covered and symmetric.  The Eustachian tube openings were unobstructed.  Going further down I had a clear view of the base of tongue which had normal appearing lingual tonsillar tissue.  The base of tongue was free of lesions, masses, and the vallecula was open.  The epiglottis was smooth and mucosally covered.  The supraglottic larynx was then clearly visualized.  It appeared mostly normal may be slight edema but no significant erythema.  The vocal cords moved smoothly and symmetrically, they were pearly white and no lesions were seen.  The pyriform sinuses were open, and the limited view of the postcricoid region did not show any lesions.      A/P - Shala Morales is a 44 year old female with globus sensation but no evidence of infection, inflammation, or neoplasm on exam to explain the symptoms.  I explained to her that the 2 most likely causes are laryngopharyngeal reflux and/or anxiety.  She should try an H2 blocker like Pepcid or Zantac.  She should also try some lifestyle changes and we discussed these.  We discussed warning symptoms and reasons to come back including dysphagia, odynophagia, hoarseness, worsening pain or symptoms, hemoptysis.  She understands.    Adult lifestyle changes to prevent LPR reviewed      Avoid eating and drinking within two to three hours prior to bedtime    Do not drink alcohol    Eat small meals and slowly    Limit problem foods:    o Caffeine  o Carbonated drinks  o Chocolate  o Peppermint  o Tomato  o Citrus fruits  o Fatty and fried foods      Lose weight    Quit smoking    Wear loose clothing      Dr. Bob Norris  Otolaryngology  [facility]      Again, thank you for allowing me to participate in the care of your patient.        Sincerely,        Bob Norris MD

## 2019-06-13 ENCOUNTER — TRANSFERRED RECORDS (OUTPATIENT)
Dept: HEALTH INFORMATION MANAGEMENT | Facility: CLINIC | Age: 44
End: 2019-06-13

## 2019-10-05 ENCOUNTER — HEALTH MAINTENANCE LETTER (OUTPATIENT)
Age: 44
End: 2019-10-05

## 2019-12-16 ENCOUNTER — MYC MEDICAL ADVICE (OUTPATIENT)
Dept: INTERNAL MEDICINE | Facility: CLINIC | Age: 44
End: 2019-12-16

## 2019-12-16 DIAGNOSIS — E61.1 IRON DEFICIENCY: ICD-10-CM

## 2019-12-16 DIAGNOSIS — Z78.9 VEGAN DIET: Primary | ICD-10-CM

## 2019-12-16 NOTE — TELEPHONE ENCOUNTER
Huddled with PCP who states that pt can do labs, if normal will need to be seen.  Future lab orders placed for iron, B12, thyroid and CBC, zinc. [employer] message sent to patient.

## 2019-12-16 NOTE — TELEPHONE ENCOUNTER
Huddled with PCP. Is patient having any problems or symptoms? If so would recommend and e-visit. If no symptoms, then okay for the lab only appointment. Diagnosis: vegan diet and iron deficiency.  Link[employer] message sent to patient.

## 2019-12-23 DIAGNOSIS — E61.1 IRON DEFICIENCY: ICD-10-CM

## 2019-12-23 DIAGNOSIS — Z78.9 VEGAN DIET: ICD-10-CM

## 2019-12-23 LAB
ERYTHROCYTE [DISTWIDTH] IN BLOOD BY AUTOMATED COUNT: 12.5 % (ref 10–15)
FERRITIN SERPL-MCNC: 32 NG/ML (ref 12–150)
HCT VFR BLD AUTO: 35.9 % (ref 35–47)
HGB BLD-MCNC: 11.9 G/DL (ref 11.7–15.7)
MCH RBC QN AUTO: 31.6 PG (ref 26.5–33)
MCHC RBC AUTO-ENTMCNC: 33.1 G/DL (ref 31.5–36.5)
MCV RBC AUTO: 95 FL (ref 78–100)
PLATELET # BLD AUTO: 216 10E9/L (ref 150–450)
RBC # BLD AUTO: 3.77 10E12/L (ref 3.8–5.2)
TSH SERPL DL<=0.005 MIU/L-ACNC: 1.08 MU/L (ref 0.4–4)
VIT B12 SERPL-MCNC: 496 PG/ML (ref 193–986)
WBC # BLD AUTO: 6.8 10E9/L (ref 4–11)

## 2019-12-23 PROCEDURE — 99000 SPECIMEN HANDLING OFFICE-LAB: CPT | Performed by: INTERNAL MEDICINE

## 2019-12-23 PROCEDURE — 84630 ASSAY OF ZINC: CPT | Mod: 90 | Performed by: INTERNAL MEDICINE

## 2019-12-23 PROCEDURE — 82607 VITAMIN B-12: CPT | Performed by: INTERNAL MEDICINE

## 2019-12-23 PROCEDURE — 82306 VITAMIN D 25 HYDROXY: CPT | Performed by: INTERNAL MEDICINE

## 2019-12-23 PROCEDURE — 82180 ASSAY OF ASCORBIC ACID: CPT | Mod: 90 | Performed by: INTERNAL MEDICINE

## 2019-12-23 PROCEDURE — 85027 COMPLETE CBC AUTOMATED: CPT | Performed by: INTERNAL MEDICINE

## 2019-12-23 PROCEDURE — 84443 ASSAY THYROID STIM HORMONE: CPT | Performed by: INTERNAL MEDICINE

## 2019-12-23 PROCEDURE — 82728 ASSAY OF FERRITIN: CPT | Performed by: INTERNAL MEDICINE

## 2019-12-23 PROCEDURE — 36415 COLL VENOUS BLD VENIPUNCTURE: CPT | Performed by: INTERNAL MEDICINE

## 2019-12-23 NOTE — RESULT ENCOUNTER NOTE
Dear Shala,    Your recent test results are attached.      No anemia.    If you have any questions please feel free to contact [phone] or myself via ExecMobile[employer].    Sincerely,  Rita Mohr, CNP

## 2019-12-23 NOTE — RESULT ENCOUNTER NOTE
Dear Sahla,    Your recent test results are attached.      Normal thyroid.    If you have any questions please feel free to contact [phone] or myself via DoubleRecallt.    Sincerely,  Rita Mohr, CNP

## 2019-12-24 LAB — DEPRECATED CALCIDIOL+CALCIFEROL SERPL-MC: 29 UG/L (ref 20–75)

## 2019-12-24 NOTE — RESULT ENCOUNTER NOTE
Dear Shala,    Your recent test results are attached.      Normal B12.  Normal iron stores.    If you have any questions please feel free to contact [phone] or myself via My[employer].    Sincerely,  Rita Mohr, CNP

## 2019-12-26 LAB — ZINC SERPL-MCNC: 72.1 UG/DL (ref 60–120)

## 2019-12-27 LAB — VIT C SERPL-MCNC: 67 UMOL/L (ref 23–114)

## 2020-01-07 ENCOUNTER — TRANSFERRED RECORDS (OUTPATIENT)
Dept: HEALTH INFORMATION MANAGEMENT | Facility: CLINIC | Age: 45
End: 2020-01-07

## 2020-02-03 ENCOUNTER — TRANSFERRED RECORDS (OUTPATIENT)
Dept: HEALTH INFORMATION MANAGEMENT | Facility: CLINIC | Age: 45
End: 2020-02-03

## 2020-02-06 NOTE — PROGRESS NOTES
Subjective     Shala Morales is a 44 year old female who presents to clinic today for the following health issues:    HPI   Gastrophageal Reflux  on going for 1 year now  Patient can't be in light for long, face will start to feel like it is burning (mostly eyes and cheeks)  Wants to know what can be done to stop the burning or a referral to be placed to get it check out  No family history to patient's issues    Patient notes that her face has been burning for over a year.  She saw dermatology and they diagnosed her with rosacea.  She noted improvement on doxycycline, but felt that pills were getting stuck and stopped taking them.  She is afraid of side effects of creams prescribed sulfacetamide and oxymetazoline.  She notes burning to her eyes as well.  She has not seen her ophthalmologist.    Patient notes increase reflux as well.  She does have difficulty swallowing pills and feels they get stuck at times.  She denies weight loss, melena, hematochezia, nausea, vomiting. She has not tried anything over the counter.        Patient Active Problem List   Diagnosis     HPV in female     Symptomatic menopausal or female climacteric states     Biliary sludge determined by ultrasound     History of hypertension     History of depression     History of alcoholism (H)     Intermittent right upper quadrant abdominal pain     Lung granuloma (H)     Vegan diet     Hyperlipidemia LDL goal <130     History reviewed. No pertinent surgical history.    Social History     Tobacco Use     Smoking status: Former Smoker     Packs/day: 1.50     Years: 15.00     Pack years: 22.50     Types: Cigarettes     Start date: 1994     Last attempt to quit: 2018     Years since quittin.1     Smokeless tobacco: Never Used   Substance Use Topics     Alcohol use: Yes     Family History   Problem Relation Age of Onset     Thyroid Disease Mother      Hypertension Mother      Diabetes Father      Hypertension Father      Thyroid Disease  "Maternal Grandmother      Hypertension Maternal Grandmother      Hypertension Maternal Grandfather      Hypertension Paternal Grandmother      Diabetes Paternal Grandfather      Prostate Cancer Paternal Grandfather      Hypertension Paternal Grandfather          Current Outpatient Medications   Medication Sig Dispense Refill     Cyanocobalamin (VITAMIN B-12) 5000 MCG LOZG Take 5,000 mcg by mouth once a week       fish oil-omega-3 fatty acids 1000 MG capsule Take 2 g by mouth daily       metroNIDAZOLE (METROGEL) 0.75 % external gel Apply topically 2 times daily 45 g 1     Multiple Vitamin (MULTI VITAMIN DAILY PO) Take 1 tablet by mouth daily       omeprazole (PRILOSEC) 20 MG DR capsule Take 1 capsule (20 mg) by mouth daily 30 capsule 1     vitamin D3 (CHOLECALCIFEROL) 2000 units (50 mcg) tablet Take 1 tablet by mouth daily       No Known Allergies  BP Readings from Last 3 Encounters:   02/07/20 120/66   04/24/19 135/87   04/22/19 136/88    Wt Readings from Last 3 Encounters:   02/07/20 72.8 kg (160 lb 9.6 oz)   04/24/19 70.3 kg (155 lb)   04/22/19 71 kg (156 lb 9.6 oz)                    Reviewed and updated as needed this visit by Provider  Tobacco  Allergies  Meds  Problems  Med Hx  Surg Hx  Fam Hx         Review of Systems   ROS COMP: Constitutional, HEENT, cardiovascular, pulmonary, gi and gu systems are negative, except as otherwise noted.      Objective    /66   Pulse 66   Temp 98.6  F (37  C) (Oral)   Resp 14   Ht 1.74 m (5' 8.5\")   Wt 72.8 kg (160 lb 9.6 oz)   SpO2 100%   BMI 24.06 kg/m    Body mass index is 24.06 kg/m .  Physical Exam   GENERAL: healthy, alert and no distress  RESP: lungs clear to auscultation - no rales, rhonchi or wheezes  CV: regular rate and rhythm, normal S1 S2, no S3 or S4, no murmur, click or rub, no peripheral edema and peripheral pulses strong  ABDOMEN: soft, nontender, no hepatosplenomegaly, no masses and bowel sounds normal  MS: no gross musculoskeletal defects " noted, no edema  SKIN: erythema - face    Diagnostic Test Results:  none         Assessment & Plan     1. Rosacea  Patient to trial metronidazole.  She was encouraged to follow-up with ophthalmology regarding possible occular rosacea.  - metroNIDAZOLE (METROGEL) 0.75 % external gel; Apply topically 2 times daily  Dispense: 45 g; Refill: 1    2. Gastroesophageal reflux disease without esophagitis    - omeprazole (PRILOSEC) 20 MG DR capsule; Take 1 capsule (20 mg) by mouth daily  Dispense: 30 capsule; Refill: 1           Return in about 2 months (around 4/23/2020) for Physical Exam, with PCP..    MARCIE Max [facility]

## 2020-02-07 ENCOUNTER — OFFICE VISIT (OUTPATIENT)
Dept: FAMILY MEDICINE | Facility: CLINIC | Age: 45
End: 2020-02-07
Payer: COMMERCIAL

## 2020-02-07 VITALS
SYSTOLIC BLOOD PRESSURE: 120 MMHG | OXYGEN SATURATION: 100 % | HEIGHT: 69 IN | WEIGHT: 160.6 LBS | HEART RATE: 66 BPM | TEMPERATURE: 98.6 F | RESPIRATION RATE: 14 BRPM | DIASTOLIC BLOOD PRESSURE: 66 MMHG | BODY MASS INDEX: 23.79 KG/M2

## 2020-02-07 DIAGNOSIS — K21.9 GASTROESOPHAGEAL REFLUX DISEASE WITHOUT ESOPHAGITIS: ICD-10-CM

## 2020-02-07 DIAGNOSIS — L71.9 ROSACEA: Primary | ICD-10-CM

## 2020-02-07 PROCEDURE — 99214 OFFICE O/P EST MOD 30 MIN: CPT | Performed by: NURSE PRACTITIONER

## 2020-02-07 RX ORDER — METRONIDAZOLE 7.5 MG/G
GEL TOPICAL 2 TIMES DAILY
Qty: 45 G | Refills: 1 | Status: SHIPPED | OUTPATIENT
Start: 2020-02-07 | End: 2020-12-22

## 2020-02-07 RX ORDER — CHOLECALCIFEROL (VITAMIN D3) 50 MCG
1 TABLET ORAL DAILY
COMMUNITY

## 2020-02-07 RX ORDER — CHLORAL HYDRATE 500 MG
2 CAPSULE ORAL DAILY
COMMUNITY
End: 2021-05-10

## 2020-02-07 ASSESSMENT — MIFFLIN-ST. JEOR: SCORE: 1439.92

## 2020-02-17 ENCOUNTER — TRANSFERRED RECORDS (OUTPATIENT)
Dept: HEALTH INFORMATION MANAGEMENT | Facility: CLINIC | Age: 45
End: 2020-02-17

## 2020-08-27 ENCOUNTER — ANCILLARY PROCEDURE (OUTPATIENT)
Dept: GENERAL RADIOLOGY | Facility: CLINIC | Age: 45
End: 2020-08-27
Attending: FAMILY MEDICINE
Payer: COMMERCIAL

## 2020-08-27 ENCOUNTER — OFFICE VISIT (OUTPATIENT)
Dept: ORTHOPEDICS | Facility: CLINIC | Age: 45
End: 2020-08-27
Payer: COMMERCIAL

## 2020-08-27 DIAGNOSIS — M79.671 PAIN OF RIGHT HEEL: ICD-10-CM

## 2020-08-27 DIAGNOSIS — M79.671 PAIN OF RIGHT HEEL: Primary | ICD-10-CM

## 2020-08-27 PROCEDURE — 99204 OFFICE O/P NEW MOD 45 MIN: CPT | Performed by: FAMILY MEDICINE

## 2020-08-27 PROCEDURE — 73630 X-RAY EXAM OF FOOT: CPT | Mod: RT

## 2020-08-27 NOTE — LETTER
8/27/2020         RE: Shala Morales  [address]  [place] [address]        Dear Colleague,    Thank you for referring your patient, Shala Morales, to the [facility]. Please see a copy of my visit note below.    ASSESSMENT & PLAN  Shala was seen today for pain.    Diagnoses and all orders for this visit:    Pain of right heel  -     XR Foot Right G/E 3 Views; Future      Patient is a 45 year old female presenting for evaluation of   Chief Complaint   Patient presents with     Right Foot - Pain     # Right Heel Pain: Sx noted over the past couple of weeks with now pain going from buttock down posterior leg.  Pt having TTP over posterior medial heel with neg back exam including neg SLR and slump testing.  XR of heel showing no stress injury but pos Achilles enthesophyte though no pain over this area on exam.  Differential Hubbard's neuropathy vs lumbar radiculopathy, lower concern for plantar fasciitis with tight heel cord likely contributing to sx.  Counseled patient on nature of condition and treatment options.  Given this plan as below, follow-up as needed  Treatment: heel lifts   Physical Therapy HEP given today, if not improved can refer for formal PT  Injection none today  Medications  Limited NSAIDs/Tylenol    Concerning signs/sx that would warrant urgent evaluation were discussed.  All questions were answered, patient understands and agrees with plan.      Return if symptoms worsen or fail to improve.      -----    SUBJECTIVE  Shala Morales is a/an 45 year old female who is seen as a self referral for evaluation of right foot pain. The patient is seen by themselves.    Onset: 2 week(s) ago. Reports insidious onset without acute precipitating event.  Pt has had HO left sided low back pain  Location of Pain: right heel, posterior leg to buttock   Rating of Pain at worst: 7/10  Rating of Pain Currently: 1/10  Worsened by: prolonged walked, prolonged  sitting   Better with: nothing   Treatments tried: rest/activity avoidance and elevation, ice   Associated symptoms: tingling  Orthopedic history: Yes-plantar fasciitis-feels different   Relevant surgical history: NO  Past Medical History:   Diagnosis Date     Depressive disorder      History of alcoholism (H) 2018     History of depression 2018     HPV in female 2018     HTN (hypertension)      Social History     Socioeconomic History     Marital status:      Spouse name: Not on file     Number of children: Not on file     Years of education: Not on file     Highest education level: Not on file   Occupational History     Not on file   Social Needs     Financial resource strain: Not on file     Food insecurity     Worry: Not on file     Inability: Not on file     Transportation needs     Medical: Not on file     Non-medical: Not on file   Tobacco Use     Smoking status: Former Smoker     Packs/day: 1.50     Years: 15.00     Pack years: 22.50     Types: Cigarettes     Start date: 1994     Last attempt to quit: 2018     Years since quittin.7     Smokeless tobacco: Never Used   Substance and Sexual Activity     Alcohol use: Yes     Drug use: No     Sexual activity: Yes   Lifestyle     Physical activity     Days per week: Not on file     Minutes per session: Not on file     Stress: Not on file   Relationships     Social connections     Talks on phone: Not on file     Gets together: Not on file     Attends Congregation service: Not on file     Active member of club or organization: Not on file     Attends meetings of clubs or organizations: Not on file     Relationship status: Not on file     Intimate partner violence     Fear of current or ex partner: Not on file     Emotionally abused: Not on file     Physically abused: Not on file     Forced sexual activity: Not on file   Other Topics Concern     Parent/sibling w/ CABG, MI or angioplasty before 65F 55M? Not Asked   Social History  Narrative     Not on file         Patient's past medical, surgical, social, and family histories were reviewed today and no changes are noted.  No family history pertinent to the patient's problem today    REVIEW OF SYSTEMS:  10 point ROS is negative other than symptoms noted above in HPI, Past Medical History or as stated below  Constitutional: NEGATIVE for fever, chills, change in weight  Skin: NEGATIVE for worrisome rashes, moles or lesions  GI/: NEGATIVE for bowel or bladder changes  Neuro: NEGATIVE for weakness, dizziness or paresthesias    OBJECTIVE:  BP (P) 124/80    General: healthy, alert and in no distress  HEENT: no scleral icterus or conjunctival erythema  Skin: no suspicious lesions or rash. No jaundice.  CV:  no pedal edema  Resp: normal respiratory effort without conversational dyspnea   Psych: normal mood and affect  Gait: normal steady gait with appropriate coordination and balance  Neuro: Normal light sensory exam of lower extremity  MSK:  RIGHT FOOT  Inspection:    no swelling or ecchymosis  Palpation:    Tender about the medial calcaneus. Otherwise remainder of bony/ligamentous landmarks are non-tender.  Range of Motion:     Grossly intact and non-painful  Strength:    Grossly intact in all planes  Special Tests:    Positive: calcaneal squeeze    Negative: anterior drawer, Tinel's (webspace), MTP stress and Lisfranc joint tenderness    RIGHT ANKLE  Inspection:    No swelling or ecchymosis is observed  Palpation:   Nontender.  Range of Motion:     Plantarflexion full / dorsiflexion limited by tightness / inversion full / eversion full  Strength:    full  Special Tests:    negative anterior drawer, negative talar tilt, negative valgus stress, negative forced external rotation/eversion, negative Roper sign, negative squeeze test. Able to perform heel raise and Able to hop.    THORACIC/LUMBAR SPINE  Inspection:    No redness, swelling, overlying skin change, gross deformity/asymmetry, scapular  winging  Palpation:   nontender.  Range of Motion:     Lumbar flexion full    Lumbar extension full    Right side bend full    Left side bend full    Right rotation full    Left rotation full  Strength:    5/5 - quadriceps, hamstrings, tibialis anterior, gastrocsoleus, and extensor hallicus longus  Special Tests:    Positive: None  Negative: straight leg raise (bilateral), slump test (bilateral)      Independent visualization of the below image:  No results found for this or any previous visit (from the past 24 hour(s)).  FOOT THREE VIEWS RIGHT  8/27/2020 12:25 PM      HISTORY: Pain of right heel     COMPARISON: None.                                                                      IMPRESSION: No acute fracture or malalignment. There is normal joint  spacing. Small Achilles calcaneal enthesophyte.     WESLY LORENZO MD  I  ordered, visualized and reviewed these images with the patient  Achilles enthesophyte    Patient's conditions were thoroughly discussed during today's visit with greater than 50% of the visit spent counseling the patient with total time spent face-to-face with the patient being 30 minutes.    Wally Morillo MD, [facility]        Again, thank you for allowing me to participate in the care of your patient.        Sincerely,        Wally Morillo MD

## 2020-08-27 NOTE — PROGRESS NOTES
ASSESSMENT & PLAN  Shala was seen today for pain.    Diagnoses and all orders for this visit:    Pain of right heel  -     XR Foot Right G/E 3 Views; Future      Patient is a 45 year old female presenting for evaluation of   Chief Complaint   Patient presents with     Right Foot - Pain     # Right Heel Pain: Sx noted over the past couple of weeks with now pain going from buttock down posterior leg.  Pt having TTP over posterior medial heel with neg back exam including neg SLR and slump testing.  XR of heel showing no stress injury but pos Achilles enthesophyte though no pain over this area on exam.  Differential Hubbard's neuropathy vs lumbar radiculopathy, lower concern for plantar fasciitis with tight heel cord likely contributing to sx.  Counseled patient on nature of condition and treatment options.  Given this plan as below, follow-up as needed  Treatment: heel lifts   Physical Therapy HEP given today, if not improved can refer for formal PT  Injection none today  Medications  Limited NSAIDs/Tylenol    Concerning signs/sx that would warrant urgent evaluation were discussed.  All questions were answered, patient understands and agrees with plan.      Return if symptoms worsen or fail to improve.      -----    SUBJECTIVE  Shala Morales is a/an 45 year old female who is seen as a self referral for evaluation of right foot pain. The patient is seen by themselves.    Onset: 2 week(s) ago. Reports insidious onset without acute precipitating event.  Pt has had HO left sided low back pain  Location of Pain: right heel, posterior leg to buttock   Rating of Pain at worst: 7/10  Rating of Pain Currently: 1/10  Worsened by: prolonged walked, prolonged sitting   Better with: nothing   Treatments tried: rest/activity avoidance and elevation, ice   Associated symptoms: tingling  Orthopedic history: Yes-plantar fasciitis-feels different   Relevant surgical history: NO  Past Medical History:   Diagnosis Date     Depressive  disorder      History of alcoholism (H) 2018     History of depression 2018     HPV in female 2018     HTN (hypertension)      Social History     Socioeconomic History     Marital status:      Spouse name: Not on file     Number of children: Not on file     Years of education: Not on file     Highest education level: Not on file   Occupational History     Not on file   Social Needs     Financial resource strain: Not on file     Food insecurity     Worry: Not on file     Inability: Not on file     Transportation needs     Medical: Not on file     Non-medical: Not on file   Tobacco Use     Smoking status: Former Smoker     Packs/day: 1.50     Years: 15.00     Pack years: 22.50     Types: Cigarettes     Start date: 1994     Last attempt to quit: 2018     Years since quittin.7     Smokeless tobacco: Never Used   Substance and Sexual Activity     Alcohol use: Yes     Drug use: No     Sexual activity: Yes   Lifestyle     Physical activity     Days per week: Not on file     Minutes per session: Not on file     Stress: Not on file   Relationships     Social connections     Talks on phone: Not on file     Gets together: Not on file     Attends Yazidism service: Not on file     Active member of club or organization: Not on file     Attends meetings of clubs or organizations: Not on file     Relationship status: Not on file     Intimate partner violence     Fear of current or ex partner: Not on file     Emotionally abused: Not on file     Physically abused: Not on file     Forced sexual activity: Not on file   Other Topics Concern     Parent/sibling w/ CABG, MI or angioplasty before 65F 55M? Not Asked   Social History Narrative     Not on file         Patient's past medical, surgical, social, and family histories were reviewed today and no changes are noted.  No family history pertinent to the patient's problem today    REVIEW OF SYSTEMS:  10 point ROS is negative other than symptoms noted  above in HPI, Past Medical History or as stated below  Constitutional: NEGATIVE for fever, chills, change in weight  Skin: NEGATIVE for worrisome rashes, moles or lesions  GI/: NEGATIVE for bowel or bladder changes  Neuro: NEGATIVE for weakness, dizziness or paresthesias    OBJECTIVE:  BP (P) 124/80    General: healthy, alert and in no distress  HEENT: no scleral icterus or conjunctival erythema  Skin: no suspicious lesions or rash. No jaundice.  CV:  no pedal edema  Resp: normal respiratory effort without conversational dyspnea   Psych: normal mood and affect  Gait: normal steady gait with appropriate coordination and balance  Neuro: Normal light sensory exam of lower extremity  MSK:  RIGHT FOOT  Inspection:    no swelling or ecchymosis  Palpation:    Tender about the medial calcaneus. Otherwise remainder of bony/ligamentous landmarks are non-tender.  Range of Motion:     Grossly intact and non-painful  Strength:    Grossly intact in all planes  Special Tests:    Positive: calcaneal squeeze    Negative: anterior drawer, Tinel's (webspace), MTP stress and Lisfranc joint tenderness    RIGHT ANKLE  Inspection:    No swelling or ecchymosis is observed  Palpation:   Nontender.  Range of Motion:     Plantarflexion full / dorsiflexion limited by tightness / inversion full / eversion full  Strength:    full  Special Tests:    negative anterior drawer, negative talar tilt, negative valgus stress, negative forced external rotation/eversion, negative Roper sign, negative squeeze test. Able to perform heel raise and Able to hop.    THORACIC/LUMBAR SPINE  Inspection:    No redness, swelling, overlying skin change, gross deformity/asymmetry, scapular winging  Palpation:   nontender.  Range of Motion:     Lumbar flexion full    Lumbar extension full    Right side bend full    Left side bend full    Right rotation full    Left rotation full  Strength:    5/5 - quadriceps, hamstrings, tibialis anterior, gastrocsoleus, and  extensor hallicus longus  Special Tests:    Positive: None  Negative: straight leg raise (bilateral), slump test (bilateral)      Independent visualization of the below image:  No results found for this or any previous visit (from the past 24 hour(s)).  FOOT THREE VIEWS RIGHT  8/27/2020 12:25 PM      HISTORY: Pain of right heel     COMPARISON: None.                                                                      IMPRESSION: No acute fracture or malalignment. There is normal joint  spacing. Small Achilles calcaneal enthesophyte.     WESLY LORENZO MD  I  ordered, visualized and reviewed these images with the patient  Achilles enthesophyte    Patient's conditions were thoroughly discussed during today's visit with greater than 50% of the visit spent counseling the patient with total time spent face-to-face with the patient being 30 minutes.    Wally Morillo MD, [facility]

## 2020-08-27 NOTE — PATIENT INSTRUCTIONS
Diagnosis: Right heel pain differential Hubbard's neuropathy vs lumbar radiculopathy   Image Findings: calcification in Achilles tendon   Treatment: heel cord stretching, quad/hamstring, activities as tolerated  Job: none  Medications: Limited tylenol/ibuprofen for pain for 1-2 weeks  Follow-up: 3-4 weeks if not improvement    Please call [phone]   Ask for my team if you have any questions or concerns    It was great seeing you today!    Wally Morillo

## 2020-09-23 ENCOUNTER — TELEPHONE (OUTPATIENT)
Dept: ORTHOPEDICS | Facility: CLINIC | Age: 45
End: 2020-09-23

## 2020-09-23 DIAGNOSIS — M79.671 PAIN OF RIGHT HEEL: Primary | ICD-10-CM

## 2020-09-23 NOTE — TELEPHONE ENCOUNTER
LVM for patient to return call.  Asked to let us know if we can leave a detailed message or respond via [employer].    Christine Pinto, ATC

## 2020-09-23 NOTE — TELEPHONE ENCOUNTER
Patient LVM with an update for Dr. Morillo.  Has had increasing/ongoing nerve pain in her leg and foot as well as some back pain.  Would like to know what the next steps are.  Is she able to have a referral for therapy?  Can be called back at     Summer Blackmon MS ATC

## 2020-09-24 ENCOUNTER — MYC MEDICAL ADVICE (OUTPATIENT)
Dept: ORTHOPEDICS | Facility: CLINIC | Age: 45
End: 2020-09-24

## 2020-10-07 ENCOUNTER — THERAPY VISIT (OUTPATIENT)
Dept: PHYSICAL THERAPY | Facility: CLINIC | Age: 45
End: 2020-10-07
Attending: FAMILY MEDICINE
Payer: COMMERCIAL

## 2020-10-07 DIAGNOSIS — M54.16 LUMBAR RADICULOPATHY: ICD-10-CM

## 2020-10-07 DIAGNOSIS — M79.671 PAIN OF RIGHT HEEL: ICD-10-CM

## 2020-10-07 PROCEDURE — 97161 PT EVAL LOW COMPLEX 20 MIN: CPT | Mod: GP | Performed by: PHYSICAL THERAPIST

## 2020-10-07 PROCEDURE — 97110 THERAPEUTIC EXERCISES: CPT | Mod: GP | Performed by: PHYSICAL THERAPIST

## 2020-10-07 PROCEDURE — 97112 NEUROMUSCULAR REEDUCATION: CPT | Mod: GP | Performed by: PHYSICAL THERAPIST

## 2020-10-09 NOTE — PROGRESS NOTES
[facility] Initial Evaluation  Subjective:  The history is provided by the patient.   Patient Health History           General health as reported by patient is good.  Pertinent medical history includes: high blood pressure, concussions/dizziness, depression, chemical dependency and numbness/tingling.   Red flags:  None as reported by patient.  Medical allergies: none.   Surgeries include:  None.    Current medications:  None.    Current occupation is LPN, currently between jobs.                     Therapist Generated HPI Evaluation  Problem details: Pain began in mid- August. .         Type of problem:  Lumbar.    This is a new condition.  Condition occurred with:  Insidious onset.    Patient reports pain:  Lumbar spine right.    Pain radiates to:  Thigh right, lower leg right, foot right and thigh left (R>L).     Associated symptoms:  Loss of motion/stiffness, loss of strength and numbness. Symptoms are exacerbated by standing, walking, sitting and lying down  and relieved by rest and activity/movement.            Oswestry Score: 14 %                 Objective:  System         Lumbar/SI Evaluation  ROM:  AROM Lumbar: normal (peripheralizes with flexion)      Lumbar Myotomes:  normal                  Neural Tension/Mobility:    Left side:  Slump positive.  Left side:SLR w/DF  negative.   Right side:   SLR w/DF and Slump positive.  Lumbar Palpation:    Tenderness present at Left:    Piriformis  Tenderness not present at Left:    Quadratus Lumborum or Vertebral  Tenderness present at Right: Quadratus Lumborum; Piriformis and Gluteus Medius  Tenderness not present at Right:  Vertebral        SI joint/Sacrum:          Left negative at:    Thigh thrust and Sacral thrust    Right negative at:  Thigh thrust and Sacral thrust                                      Hip Evaluation    Hip Strength:  : hip abd 4+/5, hip ext 4/5. : hip abd 4/5, hip ext 4/5.                          Hip Special Testing:       Left hip negative for the following special tests:  Jack  Right hip negative for the following special tests:  Jack      Functional Testing:          Quad:      Bilateral leg squat:  Excessive anterior knee excursion and mild loss of control                  General     ROS    Assessment/Plan:    Patient is a 45 year old female with lumbar complaints.    Patient has the following significant findings with corresponding treatment plan.                Diagnosis 1:  LBP with radiation into B LEs  Pain -  hot/cold therapy, manual therapy, self management, education, directional preference exercise and home program  Decreased ROM/flexibility - manual therapy, therapeutic exercise and home program  Decreased strength - therapeutic exercise, therapeutic activities and home program    Therapy Evaluation Codes:   1) History comprised of:   Personal factors that impact the plan of care:      None.    Comorbidity factors that impact the plan of care are:      None.     Medications impacting care: None.  2) Examination of Body Systems comprised of:   Body structures and functions that impact the plan of care:      Hip, Knee and Lumbar spine.   Activity limitations that impact the plan of care are:      Bathing, Driving, Lifting, Sitting, Squatting/kneeling, Stairs, Standing, Walking and Sleeping.  3) Clinical presentation characteristics are:   Stable/Uncomplicated.  4) Decision-Making    Low complexity using standardized patient assessment instrument and/or measureable assessment of functional outcome.  Cumulative Therapy Evaluation is: Low complexity.    Previous and current functional limitations:  (See Goal Flow Sheet for this information)    Short term and Long term goals: (See Goal Flow Sheet for this information)     Communication ability:  Patient appears to be able to clearly communicate and understand verbal and written communication and follow directions correctly.  Treatment Explanation - The following has  been discussed with the patient:   RX ordered/plan of care  Anticipated outcomes  Possible risks and side effects  This patient would benefit from PT intervention to resume normal activities.   Rehab potential is good.    Frequency:  1 X week, once daily  Duration:  for 8 weeks  Discharge Plan:  Achieve all LTG.  Independent in home treatment program.  Reach maximal therapeutic benefit.    Please refer to the daily flowsheet for treatment today, total treatment time and time spent performing 1:1 timed codes.

## 2020-10-11 PROBLEM — M54.16 LUMBAR RADICULOPATHY: Status: ACTIVE | Noted: 2020-10-11

## 2020-10-13 ENCOUNTER — THERAPY VISIT (OUTPATIENT)
Dept: PHYSICAL THERAPY | Facility: CLINIC | Age: 45
End: 2020-10-13
Payer: COMMERCIAL

## 2020-10-13 DIAGNOSIS — M54.16 LUMBAR RADICULOPATHY: ICD-10-CM

## 2020-10-13 PROCEDURE — 97110 THERAPEUTIC EXERCISES: CPT | Mod: GP | Performed by: PHYSICAL THERAPIST

## 2020-10-13 PROCEDURE — 97112 NEUROMUSCULAR REEDUCATION: CPT | Mod: GP | Performed by: PHYSICAL THERAPIST

## 2020-11-14 ENCOUNTER — HEALTH MAINTENANCE LETTER (OUTPATIENT)
Age: 45
End: 2020-11-14

## 2020-12-22 ENCOUNTER — OFFICE VISIT (OUTPATIENT)
Dept: FAMILY MEDICINE | Facility: CLINIC | Age: 45
End: 2020-12-22
Payer: COMMERCIAL

## 2020-12-22 VITALS
SYSTOLIC BLOOD PRESSURE: 130 MMHG | OXYGEN SATURATION: 98 % | TEMPERATURE: 98.5 F | BODY MASS INDEX: 25.59 KG/M2 | HEART RATE: 75 BPM | WEIGHT: 170.8 LBS | DIASTOLIC BLOOD PRESSURE: 72 MMHG

## 2020-12-22 DIAGNOSIS — R35.0 URINARY FREQUENCY: ICD-10-CM

## 2020-12-22 DIAGNOSIS — N76.0 BACTERIAL VAGINOSIS: Primary | ICD-10-CM

## 2020-12-22 DIAGNOSIS — B96.89 BACTERIAL VAGINOSIS: Primary | ICD-10-CM

## 2020-12-22 LAB
ALBUMIN UR-MCNC: NEGATIVE MG/DL
APPEARANCE UR: CLEAR
BILIRUB UR QL STRIP: NEGATIVE
COLOR UR AUTO: YELLOW
GLUCOSE UR STRIP-MCNC: NEGATIVE MG/DL
HGB UR QL STRIP: NEGATIVE
KETONES UR STRIP-MCNC: NEGATIVE MG/DL
LEUKOCYTE ESTERASE UR QL STRIP: NEGATIVE
NITRATE UR QL: NEGATIVE
PH UR STRIP: 5.5 PH (ref 5–7)
SOURCE: NORMAL
SP GR UR STRIP: <=1.005 (ref 1–1.03)
SPECIMEN SOURCE: ABNORMAL
UROBILINOGEN UR STRIP-ACNC: 0.2 EU/DL (ref 0.2–1)
WET PREP SPEC: ABNORMAL

## 2020-12-22 PROCEDURE — 81003 URINALYSIS AUTO W/O SCOPE: CPT | Performed by: NURSE PRACTITIONER

## 2020-12-22 PROCEDURE — 87210 SMEAR WET MOUNT SALINE/INK: CPT | Performed by: NURSE PRACTITIONER

## 2020-12-22 PROCEDURE — 99213 OFFICE O/P EST LOW 20 MIN: CPT | Performed by: NURSE PRACTITIONER

## 2020-12-22 RX ORDER — OLOPATADINE HYDROCHLORIDE 1 MG/ML
SOLUTION/ DROPS OPHTHALMIC
COMMUNITY
Start: 2020-12-07 | End: 2022-01-19

## 2020-12-22 RX ORDER — IVERMECTIN 10 MG/G
CREAM TOPICAL
COMMUNITY
Start: 2020-02-01 | End: 2023-01-24

## 2020-12-22 RX ORDER — METRONIDAZOLE 7.5 MG/G
1 GEL VAGINAL DAILY
Qty: 35 G | Refills: 0 | Status: SHIPPED | OUTPATIENT
Start: 2020-12-22 | End: 2020-12-29

## 2020-12-29 ENCOUNTER — MYC MEDICAL ADVICE (OUTPATIENT)
Dept: FAMILY MEDICINE | Facility: CLINIC | Age: 45
End: 2020-12-29

## 2020-12-29 DIAGNOSIS — N76.0 BACTERIAL VAGINOSIS: ICD-10-CM

## 2020-12-29 DIAGNOSIS — B96.89 BACTERIAL VAGINOSIS: ICD-10-CM

## 2020-12-29 RX ORDER — METRONIDAZOLE 7.5 MG/G
1 GEL VAGINAL DAILY
Qty: 35 G | Refills: 0 | Status: SHIPPED | OUTPATIENT
Start: 2020-12-29 | End: 2021-02-09

## 2021-01-04 ENCOUNTER — TELEPHONE (OUTPATIENT)
Dept: INTERNAL MEDICINE | Facility: CLINIC | Age: 46
End: 2021-01-04

## 2021-01-04 DIAGNOSIS — B96.89 BACTERIAL VAGINOSIS: Primary | ICD-10-CM

## 2021-01-04 DIAGNOSIS — N76.0 BACTERIAL VAGINOSIS: Primary | ICD-10-CM

## 2021-01-04 NOTE — TELEPHONE ENCOUNTER
Patient could try clindamycin suppository for 7 days to see if this help relief symptoms.  If not improving, I would recommend a follow-up visit for repeat testing.    Rita Mohr, CNP

## 2021-01-04 NOTE — TELEPHONE ENCOUNTER
Reason for call:  Patient reporting a symptom    Symptom or request: bacterial vaginal infection    Duration (how long have symptoms been present): 3 weeks    Have you been treated for this before? Yes    Additional comments: Patient was treated for a bacterial vaginal infection 12-22, then called in and had treatment extended 12-29.  Was told if she was not better to contact the clinic.  She said she is showing very little improvement, maybe about 30%.    Phone Number patient can be reached at:  Home number on file [phone] (home)    Best Time:  Anytime    Can we leave a detailed message on this number:  YES    Call taken on 1/4/2021 at 9:09 AM by Bere Gillette

## 2021-01-04 NOTE — TELEPHONE ENCOUNTER
Called patient. She states that she is still experiencing burning and cramping and she is wondering what she should do. Usually external burning with urination and some burning when not using the bathroom as well. No discharge or drainage. No foul smelling/fishing odor. Symptoms tend to worsen in the evening. She states that it feels like when she had a biopsy done on the cervix, that is the only way that she describe the cramping that she is feeling both in the vagina and in the lower pelvic area. No fever, nausea or vomiting.  Pt has done 7 day treatment with Metro gel x2. Pt does not tolerate oral antibiotics very well due to sensitive stomach. Pt has been practicing abstinence. Not using soaps currently because it burns.   Please advise.

## 2021-01-05 NOTE — TELEPHONE ENCOUNTER
Pt was given provider's message as written. Script sent to pharmacy.    Alma Banerjee RN  [facility]

## 2021-01-06 ENCOUNTER — MYC MEDICAL ADVICE (OUTPATIENT)
Dept: FAMILY MEDICINE | Facility: CLINIC | Age: 46
End: 2021-01-06

## 2021-01-06 DIAGNOSIS — B37.31 VULVOVAGINAL CANDIDIASIS: ICD-10-CM

## 2021-01-06 DIAGNOSIS — N76.0 BACTERIAL VAGINOSIS: Primary | ICD-10-CM

## 2021-01-06 DIAGNOSIS — B96.89 BACTERIAL VAGINOSIS: Primary | ICD-10-CM

## 2021-01-06 NOTE — TELEPHONE ENCOUNTER
Routed to provider to please advise.    Colette BSN-RN  Triage Nurse  [facility]

## 2021-01-07 NOTE — TELEPHONE ENCOUNTER
Vangie calling on patients medication the clindamycin (CLEOCIN 1 DOSE) 2 % vaginal cream  Place 1 applicator vaginally At Bedtime for 7 days.  They are calling about the directions as it is a single applicator.  Nakia Cox,

## 2021-01-08 RX ORDER — FLUCONAZOLE 150 MG/1
150 TABLET ORAL ONCE
Qty: 1 TABLET | Refills: 0 | Status: SHIPPED | OUTPATIENT
Start: 2021-01-08 | End: 2021-01-08

## 2021-01-08 NOTE — TELEPHONE ENCOUNTER
If she got the single dose, she can just do one dose.  Diflucan 150 mg po x 1 dose.    Rita Mohr, CNP

## 2021-01-08 NOTE — TELEPHONE ENCOUNTER
Please clarify what the pharmacy needs to give her the full 7 day course.... I thought I had sent that.  Please also have her start diflucan 150 mg daily for the cottage cheese discharge. (indication vulvovaginal candidiasis).    Rita Mohr, CNP

## 2021-01-08 NOTE — TELEPHONE ENCOUNTER
Shala states she still needs the proper 7 day course of medication for her BV. She was given a one time dose and currently still has symptoms.  Now she is havinng Cottage cheese like discharge in addition to original symptoms.      Advised I would sent to ordering provider to review and advise.

## 2021-01-08 NOTE — TELEPHONE ENCOUNTER
Spoke with pharmacist,. Clindamycin comes in 2 brands. 1 brand is for 7 days and other brand is for 1 single dose.    1 dose only is cheaper. Picked up the 1 day only. Pt needs confirmation. Is she supposed to use for 7 days or just 1 day?    For the diflucan, how many days should she use this? Please advise on qty and refills.    Alma Banerjee RN  [facility]

## 2021-02-06 ENCOUNTER — HEALTH MAINTENANCE LETTER (OUTPATIENT)
Age: 46
End: 2021-02-06

## 2021-02-08 ENCOUNTER — OFFICE VISIT (OUTPATIENT)
Dept: FAMILY MEDICINE | Facility: CLINIC | Age: 46
End: 2021-02-08
Payer: COMMERCIAL

## 2021-02-08 VITALS
SYSTOLIC BLOOD PRESSURE: 134 MMHG | DIASTOLIC BLOOD PRESSURE: 76 MMHG | OXYGEN SATURATION: 100 % | HEART RATE: 84 BPM | BODY MASS INDEX: 25.62 KG/M2 | WEIGHT: 171 LBS

## 2021-02-08 DIAGNOSIS — R30.0 DYSURIA: Primary | ICD-10-CM

## 2021-02-08 PROCEDURE — 99213 OFFICE O/P EST LOW 20 MIN: CPT | Performed by: NURSE PRACTITIONER

## 2021-02-08 RX ORDER — GABAPENTIN 100 MG/1
CAPSULE ORAL
COMMUNITY
Start: 2021-02-05 | End: 2021-02-22

## 2021-02-08 RX ORDER — PREDNISOLONE ACETATE 10 MG/ML
SUSPENSION/ DROPS OPHTHALMIC
COMMUNITY
Start: 2021-01-15 | End: 2022-01-19

## 2021-02-08 NOTE — PROGRESS NOTES
Assessment & Plan     Dysuria  Patient to seek a second opinion from a different urologist.  She will also keep appointment with vulvar clinic.  She'll continue gabapentin at this time.  Consider amitriptyline in the future if not improving.  - UROLOGY ADULT REFERRAL; Future    Review of prior external note(s) from - Outside records from [facility]             Return in about 3 months (around 5/8/2021) for Physical Exam.    MARCIE Max CNP  [facility] MARISELA Morales is a 45 year old who presents to clinic today for the following health issues     HPI       Patient has had on going bladder spasms and dysuria for the past 6 weeks.  Symptoms started after intercourse.  She had treatment for BV, but symptoms continued.  UA has been negative.  She saw [facility] and they felt that she had post-herpetic neuralgia.  She denies an outbreak of herpes and finds this difficult to link to her current pain.  She wonders if she should seek a second opinion.    Review of Systems   Constitutional, HEENT, cardiovascular, pulmonary, gi and gu systems are negative, except as otherwise noted.      Objective    /76   Pulse 84   Wt 77.6 kg (171 lb)   SpO2 100%   BMI 25.62 kg/m    Body mass index is 25.62 kg/m .  Physical Exam   GENERAL: healthy, alert and no distress  MS: no gross musculoskeletal defects noted, no edema  PSYCH: mentation appears normal, affect normal/bright

## 2021-02-22 ENCOUNTER — MYC MEDICAL ADVICE (OUTPATIENT)
Dept: FAMILY MEDICINE | Facility: CLINIC | Age: 46
End: 2021-02-22

## 2021-02-22 DIAGNOSIS — N94.819 VULVODYNIA: Primary | ICD-10-CM

## 2021-02-22 RX ORDER — GABAPENTIN 100 MG/1
CAPSULE ORAL
Qty: 300 CAPSULE | Refills: 1 | Status: SHIPPED | OUTPATIENT
Start: 2021-02-22 | End: 2021-03-29

## 2021-03-08 ENCOUNTER — MYC MEDICAL ADVICE (OUTPATIENT)
Dept: UROLOGY | Facility: CLINIC | Age: 46
End: 2021-03-08

## 2021-03-08 ENCOUNTER — VIRTUAL VISIT (OUTPATIENT)
Dept: UROLOGY | Facility: CLINIC | Age: 46
End: 2021-03-08
Payer: COMMERCIAL

## 2021-03-08 DIAGNOSIS — R35.0 URINARY FREQUENCY: ICD-10-CM

## 2021-03-08 DIAGNOSIS — M62.89 PELVIC FLOOR DYSFUNCTION IN FEMALE: Primary | ICD-10-CM

## 2021-03-08 DIAGNOSIS — R39.15 URINARY URGENCY: ICD-10-CM

## 2021-03-08 DIAGNOSIS — R10.2 PELVIC PAIN IN FEMALE: ICD-10-CM

## 2021-03-08 DIAGNOSIS — B00.9 HERPES SIMPLEX VIRUS INFECTION: ICD-10-CM

## 2021-03-08 PROCEDURE — 99204 OFFICE O/P NEW MOD 45 MIN: CPT | Mod: GT | Performed by: UROLOGY

## 2021-03-08 RX ORDER — DIAZEPAM 2.5 MG/.5ML
GEL RECTAL
Qty: 5 SUPPOSITORY | Refills: 0 | Status: SHIPPED | OUTPATIENT
Start: 2021-03-08 | End: 2021-03-09

## 2021-03-08 RX ORDER — LIDOCAINE HYDROCHLORIDE 20 MG/ML
JELLY TOPICAL
COMMUNITY
Start: 2021-02-22 | End: 2022-02-23

## 2021-03-08 RX ORDER — VALACYCLOVIR HYDROCHLORIDE 1 G/1
1000 TABLET, FILM COATED ORAL 2 TIMES DAILY
Qty: 20 TABLET | Refills: 0 | Status: SHIPPED | OUTPATIENT
Start: 2021-03-08 | End: 2021-05-10

## 2021-03-08 NOTE — PROGRESS NOTES
Shala Morales  who is being evaluated via a billable video visit.      How would you like to obtain your AVS? MyChart  If the video visit is dropped, the invitation should be resent by: Send to e-mail at: [email or web address]  Will anyone else be joining your video visit? No    Video-Visit Details    Type of service:  Video Visit    Video Start Time: 10:02 AM    Video End Time:10:35 AM    Originating Location (pt. Location): Home    Distant Location (provider location):  [facility]     Platform used for Video Visit: [employer]

## 2021-03-08 NOTE — PATIENT INSTRUCTIONS
-treat empirically with Valtrex  -referral to neurology   -MRI of lower back  -referral to PT  -Rx for Vaginal valium  -f/u in 1 month in person for cystoscopy and review of symptoms and imagine.

## 2021-03-08 NOTE — PROGRESS NOTES
HPI:  Shala Morales is a 45 year old female being seen for bladder pressure and pain.  She was have bladder and vaginal pain.  She was seen by a urogynecologist.  She was found to have HSV and diagnosed with neuralgia.  She was started on Gabapentin but at 400 mg tid she started to have blurry vision.  She was then switched to nortryptylline but that did not help her.  This started in Nov of 2020, she went to have intercourse and she started to have burning pain.  She was given metrogel X 2 weeks, then she developed a yeast infection and was started on Diflucan.  But her symptoms progressively worsened.  She now has a pain in the vagina and urethra as well as bladder.  She underwent instillations of steroids and bicarb into the bladder X 4 hours and while in there it helped but not afterwards.  More recently she was given vagifem but it made the pain worse.  She has a difficult time sitting.  The only time she has any relief is at night.  And in the am her pain is almost gone but then in the afternoon it ramps up again.  Her urinary frequency has improved from 20 times per day to 10 after using the gabapentin which she went back to so she is 300 mg in am and at noon and then 400 mg at night, and her vision blurriness is tolerated.  She has never been pregnant.  She has not been to pelvic floor PT    Exam:  There were no vitals taken for this visit.  GENERAL: Healthy, alert and no distress  EYES: Eyes grossly normal to inspection.  No discharge or erythema, or obvious scleral/conjunctival abnormalities.  RESP: No audible wheeze, cough, or visible cyanosis.  No visible retractions or increased work of breathing.    SKIN: Visible skin clear. No significant rash, abnormal pigmentation or lesions.  NEURO: Cranial nerves grossly intact.  Mentation and speech appropriate for age.  PSYCH: Mentation appears normal, affect normal/bright, judgement and insight intact, normal speech and appearance well-groomed.    Review of  Labs:  The following labs were reviewed by me and discussed with the patient:  2/4/21:UA normal from straight cath  2/4/21 HSV I positive  12/22/20 wet prep positive for clue cells.    Assessment & Plan   44 y/o female with pelvic floor dysfunction associated with overactive bladder, urgency, frequency, and well as bladder and vaginal pain.  She has some response to Gabapentin but also associated with blurry vision.  She has attempted bladder instillation and vaginal estrogen cream w/o improvement in her symptoms.  She was also diagnosed with HSV-1.  We discussed treatment with Valtrex emprically, further w/u, referral to neurology, and PT.  We also discussed vaginal valium.  Pt. Would like to try anything to help with symptoms.  -treat empirically with Valtrex  -referral to neurology   -MRI of lower back  -referral to PT  -Rx for Vaginal valium  -f/u in 1 month in person for cystoscopy and review of symptoms and imagine.      Carrol Levin MD  [facility]      ==========================    Additional Billing and Coding Information:    45 minutes spent on the date of the encounter doing chart review, history and exam, documentation and further activities as noted above    ==========================

## 2021-03-09 DIAGNOSIS — R10.2 PELVIC PAIN IN FEMALE: ICD-10-CM

## 2021-03-09 DIAGNOSIS — M62.89 PELVIC FLOOR DYSFUNCTION IN FEMALE: ICD-10-CM

## 2021-03-09 RX ORDER — DIAZEPAM 2.5 MG/.5ML
GEL RECTAL
Qty: 5 SUPPOSITORY | Refills: 0 | Status: SHIPPED | OUTPATIENT
Start: 2021-03-09 | End: 2021-04-07

## 2021-03-10 ENCOUNTER — ANCILLARY PROCEDURE (OUTPATIENT)
Dept: MRI IMAGING | Facility: CLINIC | Age: 46
End: 2021-03-10
Attending: UROLOGY
Payer: COMMERCIAL

## 2021-03-10 DIAGNOSIS — R10.2 PELVIC PAIN IN FEMALE: ICD-10-CM

## 2021-03-10 DIAGNOSIS — R39.15 URINARY URGENCY: ICD-10-CM

## 2021-03-10 DIAGNOSIS — R35.0 URINARY FREQUENCY: ICD-10-CM

## 2021-03-10 DIAGNOSIS — M62.89 PELVIC FLOOR DYSFUNCTION IN FEMALE: ICD-10-CM

## 2021-03-10 PROCEDURE — 72158 MRI LUMBAR SPINE W/O & W/DYE: CPT | Performed by: RADIOLOGY

## 2021-03-10 PROCEDURE — A9585 GADOBUTROL INJECTION: HCPCS | Performed by: RADIOLOGY

## 2021-03-10 RX ORDER — GADOBUTROL 604.72 MG/ML
7.5 INJECTION INTRAVENOUS ONCE
Status: COMPLETED | OUTPATIENT
Start: 2021-03-10 | End: 2021-03-10

## 2021-03-10 RX ADMIN — GADOBUTROL 7.5 ML: 604.72 INJECTION INTRAVENOUS at 12:32

## 2021-03-10 NOTE — DISCHARGE INSTRUCTIONS
MRI Contrast Discharge Instructions    The IV contrast you received today will pass out of your body in your  urine. This will happen in the next 24 hours. You will not feel this process.  Your urine will not change color.    Drink at least 4 extra glasses of water or juice today (unless your doctor  has restricted your fluids). This reduces the stress on your kidneys.  You may take your regular medicines.    If you are on dialysis: It is best to have dialysis today.    If you have a reaction: Most reactions happen right away. If you have  any new symptoms after leaving the hospital (such as hives or swelling),  call your hospital at the correct number below. Or call your family doctor.  If you have breathing distress or wheezing, call 911.    Special instructions: ***    I have read and understand the above information.    Signature:______________________________________ Date:___________    Staff:__________________________________________ Date:___________     Time:__________    [facility] Departments:    ___[place]: [phone]  ___[facility]: [phone]  ___[place]: [phone] ___[facility]: [phone]  ___[facility]: [phone]  ___[facility]: [phone]  ___[phone]  ___[facility]: [phone]  ___[phone]

## 2021-03-10 NOTE — LETTER
March 11, 2021      Shala ANSARI Davonal  [address] [address]        Dear ,    We are writing to inform you of your recent test results which are essentially normal.    Resulted Orders   MR Lumbosacral Plexus wwo Contrast    Narrative    Lumbosacral plexus MR without and with contrast    History: Lumbosacral plexopathy, nontraumatic; Pelvic floor  dysfunction in female; Pelvic pain in female; Urinary urgency; Urinary  frequency.  Comparison: none    Technique: Axial T1-weighted, T2-weighted, and paracoronal inversion  recovery images obtained through the lumbosacral plexus region without  intravenous contrast. Post intravenous contras using gadolinium axial  T1-weighted images with gadolinium and paracoronal T1-weighted with  fat-saturation were obtained.    Contrast: 7.5 cc Gadavist    Findings: No definite mass or abnormality in the region of the  lumbosacral plexus is noted. The visualized lower lumbar and sacral  spinal canal and neural foramina are patent. The marrow signal is  unremarkable.    Postcontrast images do not demonstrate any abnormal enhancement of the  paraspinous tissues, visualized vertebra, or within the visualized  portion of the thecal sac.      Impression    Impression: No lumbosacral plexus abnormality is identified.    TAWNYA CARDONA MD       Please contact the [facility] at [phone] with any questions.      Sincerely,      Dr. Levin's Office

## 2021-03-11 ENCOUNTER — MYC MEDICAL ADVICE (OUTPATIENT)
Dept: UROLOGY | Facility: CLINIC | Age: 46
End: 2021-03-11

## 2021-03-11 NOTE — TELEPHONE ENCOUNTER
Message sent to [facility] staff to advise how to schedule neuro referral, as the following diagnosis are not on the neurology protocols.    Pelvic floor dysfunction in female [M62.89]  Pelvic pain in female [R10.2]  Urinary urgency [R39.15]  Urinary frequency [R35.0]    Will await response.      Shilpa Arreaga  Surgical Specialties Procedure   [facility]  3/11/2021 4:25 PM

## 2021-03-12 ENCOUNTER — TELEPHONE (OUTPATIENT)
Dept: UROLOGY | Facility: CLINIC | Age: 46
End: 2021-03-12

## 2021-03-12 NOTE — TELEPHONE ENCOUNTER
Health Call Center    Phone Message    May a detailed message be left on voicemail: yes     Reason for Call: Other: Pt calling to schedule a Cystoscopy for the [facility], instead of [facility]. Pt wants to switch to the [facility] with Dr. Levin. Please contact pt at [phone] to confirm if it ok to switch clinics and make appt. Thank you.     Action Taken: Message routed to:  [facility] ([facility]): clinical coordinators    Travel Screening: Not Applicable

## 2021-03-12 NOTE — TELEPHONE ENCOUNTER
Carrol Levin MD Berkenes, Melissa, RN; [facility] [place] 14 hours ago (5:21 PM)     The consult is for neuralgia

## 2021-03-15 ENCOUNTER — MYC MEDICAL ADVICE (OUTPATIENT)
Dept: FAMILY MEDICINE | Facility: CLINIC | Age: 46
End: 2021-03-15

## 2021-03-15 DIAGNOSIS — N94.819 VULVODYNIA: ICD-10-CM

## 2021-03-16 ENCOUNTER — DOCUMENTATION ONLY (OUTPATIENT)
Dept: CARE COORDINATION | Facility: CLINIC | Age: 46
End: 2021-03-16

## 2021-03-21 LAB — PAP-ABSTRACT: NORMAL

## 2021-03-24 ENCOUNTER — TRANSFERRED RECORDS (OUTPATIENT)
Dept: MULTI SPECIALTY CLINIC | Facility: CLINIC | Age: 46
End: 2021-03-24
Payer: COMMERCIAL

## 2021-03-24 LAB
HPV ABSTRACT: NORMAL
PAP SMEAR - HIM PATIENT REPORTED: NORMAL

## 2021-03-26 ASSESSMENT — ENCOUNTER SYMPTOMS
FLANK PAIN: 0
BLOOD IN STOOL: 0
MYALGIAS: 1
NERVOUS/ANXIOUS: 1
SINUS CONGESTION: 0
SMELL DISTURBANCE: 0
DYSURIA: 1
NAUSEA: 0
DIFFICULTY URINATING: 1
JOINT SWELLING: 0
DIARRHEA: 0
JAUNDICE: 0
TASTE DISTURBANCE: 0
PANIC: 0
EYE REDNESS: 0
SORE THROAT: 0
EYE IRRITATION: 1
STIFFNESS: 1
HOARSE VOICE: 0
TROUBLE SWALLOWING: 0
DEPRESSION: 1
MUSCLE CRAMPS: 0
HOT FLASHES: 0
NECK PAIN: 0
DOUBLE VISION: 0
EYE WATERING: 0
INSOMNIA: 1
BLOATING: 1
ARTHRALGIAS: 1
MUSCLE WEAKNESS: 0
BOWEL INCONTINENCE: 0
EYE PAIN: 0
VOMITING: 0
CONSTIPATION: 1
SINUS PAIN: 0
ABDOMINAL PAIN: 1
DECREASED LIBIDO: 1
RECTAL PAIN: 0
HEMATURIA: 0
HEARTBURN: 0
DECREASED CONCENTRATION: 1
BACK PAIN: 1
NECK MASS: 0

## 2021-03-26 NOTE — PROGRESS NOTES
Shala Morales is a 46 year old who is being evaluated via a billable video visit.      How would you like to obtain your AVS? MyChart  If the video visit is dropped, the invitation should be resent by: Text to cell phone: [phone]  Will anyone else be joining your video visit? No      Video Start Time: 12:27 PM    Assessment & Plan     Vulvodynia  Patient to continue current meds.  - gabapentin (NEURONTIN) 100 MG capsule; Take 3 capsules (300 mg) by mouth every morning AND 4 capsules (400 mg) 2 times daily.  - nortriptyline (PAMELOR) 10 MG capsule; Take 2 capsules (20 mg) by mouth At Bedtime    Constipation, unspecified constipation type  I advised regular colace, miralax over the counter.      Review of prior external note(s) from - Urology             Return in about 6 months (around 9/29/2021) for Medication Recheck.    MARCIE Max CNP  [facility]    Subjective   Shala Morales is a 46 year old who presents for the following health issues     HPI   Chief Complaint   Patient presents with     Pain     Patient has had a second opinion with urology.  She had normal lumbar spine MRI and was referred to pelvic floor physical therapy, neurology.  She has had some relief with vaginal valium.  She was also started on vaginal estrogen.    Patient notes that sleep and urinary frequency has improved.  She notes pain with sitting.    Patient notes that she self increased nortriptyline to 20 mg daily and does feel it helps some.  She notes chronic constipation, which has not worsened with nortriptyline.    She is taking gabapentin 300 mg in the am, 400 mg in the afternoon and evening.  She does note blurred vision, but it is tolerable.          Review of Systems   Constitutional, HEENT, cardiovascular, pulmonary, gi and gu systems are negative, except as otherwise noted.      Objective           Vitals:  No vitals were obtained today due to virtual visit.    Physical Exam   GENERAL:  Healthy, alert and no distress  EYES: Eyes grossly normal to inspection.  No discharge or erythema, or obvious scleral/conjunctival abnormalities.  RESP: No audible wheeze, cough, or visible cyanosis.  No visible retractions or increased work of breathing.    SKIN: Visible skin clear. No significant rash, abnormal pigmentation or lesions.  NEURO: Cranial nerves grossly intact.  Mentation and speech appropriate for age.  PSYCH: Mentation appears normal, affect normal/bright, judgement and insight intact, normal speech and appearance well-groomed.                Video-Visit Details    Type of service:  Video Visit    Video End Time:12:42 PM    Originating Location (pt. Location): Home    Distant Location (provider location):  [facility]     Platform used for Video Visit: [employer]

## 2021-03-28 DIAGNOSIS — R10.2 PELVIC PAIN IN FEMALE: ICD-10-CM

## 2021-03-28 DIAGNOSIS — M62.89 PELVIC FLOOR DYSFUNCTION IN FEMALE: ICD-10-CM

## 2021-03-29 ENCOUNTER — VIRTUAL VISIT (OUTPATIENT)
Dept: FAMILY MEDICINE | Facility: CLINIC | Age: 46
End: 2021-03-29
Payer: COMMERCIAL

## 2021-03-29 DIAGNOSIS — N94.819 VULVODYNIA: ICD-10-CM

## 2021-03-29 DIAGNOSIS — K59.00 CONSTIPATION, UNSPECIFIED CONSTIPATION TYPE: Primary | ICD-10-CM

## 2021-03-29 PROCEDURE — 99213 OFFICE O/P EST LOW 20 MIN: CPT | Mod: GT | Performed by: NURSE PRACTITIONER

## 2021-03-29 RX ORDER — GABAPENTIN 100 MG/1
CAPSULE ORAL
Qty: 330 CAPSULE | Refills: 5 | Status: SHIPPED | OUTPATIENT
Start: 2021-03-29 | End: 2021-05-10

## 2021-03-29 RX ORDER — NORTRIPTYLINE HCL 10 MG
20 CAPSULE ORAL AT BEDTIME
Qty: 60 CAPSULE | Refills: 5 | Status: SHIPPED | OUTPATIENT
Start: 2021-03-29 | End: 2021-04-09

## 2021-03-29 NOTE — TELEPHONE ENCOUNTER
diazepam 10 mg SUPP      Last Written Prescription Date:  3/9/2021  Last Fill Quantity: 5 suppository,   # refills: 0  Last Office Visit : 3/8/2021  Future Office visit:  4/14/2021 (cysto)    Routing refill request to provider for review/approval because:  Medication not on the Urology refill protocol- controlled substance.  - last office visit at [facility]

## 2021-04-01 ENCOUNTER — TRANSFERRED RECORDS (OUTPATIENT)
Dept: HEALTH INFORMATION MANAGEMENT | Facility: CLINIC | Age: 46
End: 2021-04-01

## 2021-04-06 ENCOUNTER — THERAPY VISIT (OUTPATIENT)
Dept: PHYSICAL THERAPY | Facility: CLINIC | Age: 46
End: 2021-04-06
Attending: UROLOGY
Payer: COMMERCIAL

## 2021-04-06 DIAGNOSIS — R39.15 URINARY URGENCY: ICD-10-CM

## 2021-04-06 DIAGNOSIS — R10.2 PELVIC PAIN IN FEMALE: ICD-10-CM

## 2021-04-06 DIAGNOSIS — R35.0 URINARY FREQUENCY: ICD-10-CM

## 2021-04-06 DIAGNOSIS — M62.89 PELVIC FLOOR DYSFUNCTION IN FEMALE: ICD-10-CM

## 2021-04-06 DIAGNOSIS — K59.00 CONSTIPATION: ICD-10-CM

## 2021-04-06 PROCEDURE — 97162 PT EVAL MOD COMPLEX 30 MIN: CPT | Mod: GP | Performed by: PHYSICAL THERAPIST

## 2021-04-06 PROCEDURE — 97530 THERAPEUTIC ACTIVITIES: CPT | Mod: GP | Performed by: PHYSICAL THERAPIST

## 2021-04-06 NOTE — PROGRESS NOTES
Physical Therapy Initial Evaluation  Subjective:  The history is provided by the patient. No  was used.   Patient Health History  Shala Morales being seen for Pelvic floor evaluation for Burning pain in bladder and vagina worsened by sitting.     Problem began: 12/13/2020.   Problem occurred: Symptoms began after attempting to have intercourse with . Bladder and vaginal issues with symptoms that have worsened over time. Unsure of causation.   Pain is reported as 4/10 on pain scale.  General health as reported by patient is good.  Pertinent medical history includes: changes in bowel/bladder, menopausal, chemical dependency, overweight, high blood pressure, depression, smoking and other (recovering alcoholic(9yrs)).   Red flags:  Changes in bowel and bladder habits.  Medical allergies: none.   Surgeries include:  Other (for precancerous cells on cervix).    Current medications:  Anti-depressants, anti-seizure medication, hormone replacement therapy and pain medication.    Current occupation is Nurse, presently unemployed.   Primary job tasks include:  Other.   Other job/home tasks details: Currently home tasks and level of activity are dictated by level of pain.                Therapist Generated HPI Evaluation  Problem details: Date of MD order for this episode was 3-8-21(Dr Levin). Shala states she is having bladder and vaginal pain. The onset was 12-13-20 when having intercourse, she felt a lot of burning in the the pelvic floor.She had never had issues prior to that with pain, burning or her bladder. She thought she had a yeast infection so used monistat which did not help. She went to the MD and was found to have BV. She did 2 wks of treatment which did not resolve her symptoms. The urinary issues started after using the metrogel the first week. She eventually saw a urogynecologist and had some bladder instillations.  Which did not alter her symptoms. She was found to have  "HSV1(pos test).  She has been taking gabapentin and nortriptyline. This combo has made the pain more manageable and she can function, prior was not able to sleep or function  Saw Dr Levin for a second opinion, she tried valtrex without any change. She will have a cystoscopy next week. MRI lumbar spine was normal.     Bladder- No urinary leakage. Void times day-every 1-2 hrs( was 20x/day and currently 9-10x/day) Does get a very strong urge to go and must get to the bathroom., does not leak on the way. Does a lot of \"just in case voiding\" No post void dribble. Up at night 1x to void.  The stream does not feel as strong as it was, sometimes strains to void.   Fluids-80 oz water  Diet-was vegan prior to this onset, stopped eating soy. States she is on \"unhealthy diet\" now. States she is eating a lot of veggies and cheese/eggs. Fearful of irritating bladder with certain foods. \"Somedays\" feels she gets enough fiber.   Bowel-has a BM 2-3x/wk, the stool is hard and has to strain.Type 1-2 ave, but ranges from 1-4 on [facility] stool chart. Prior to this had BM 2-3x/wk and type 4. Does not feel empty. Using colace free daily. Does have an urge to have a BM  Pain- some constant degree of discomfort from under belly button to pubis. Some days better and others worse. Also intravaginal discomfort/deep vaginal burning, \"feels like it's on fire in there\".  Ice brings the symptoms down. Not having intercourse due to the pain. Not on birth control, post menopausal levels with testing. Was worse with vagifem, tried a compounded cream for 2wks(initially burned)- when stopped daily her gnawing/burning symptoms returned. Tries to wear loose cotton clothing, not using soaps, pats after voiding and washes with water.   Had leap procedure in 2018 to shave cells off cervix(precancerous cells) Just had clean pap. No abdominal scars.   Exercise-walks 3-5x/wk, 3-4 miles, 45-60 min. Did  kickboxing prior to this onset daily for 1 hr, started Nov " 2020  Sleep is disrupted due to the pain, sleeps semi reclined. Sits on side or back on ischial tuberosities  Permission given for vaginal exam.   .         Type of problem:  Pelvic dysfunction (pelvic pain, burning, urinary urgency/frequency, constipation).    This is a new condition.  Condition occurred with:  Insidious onset (after intercourse).  Where condition occurred: for unknown reasons.  Patient reports pain:  Other (abdominal, rectal tightness and deep vaginal pain/burning).  Pain is described as aching, burning and other (itching gnawing) and is constant.  Pain timing: worse as the day progresses, almost gone in am.  Since onset symptoms are gradually improving.  Symptoms are exacerbated by sitting and other (laying on back, sleep Just not sure)  and relieved by ice and other (off loading the PF, walking).  Special tests included:  MRI (spine was normal).    Work activity restrictions: not working due to covid.  Barriers include:  None as reported by patient.                        Objective:        Flexibility/Screens:       Lower Extremity:  Decreased left lower extremity flexibility:Piriformis and Hamstrings    Decreased right lower extremity flexibility:  Piriformis and Hamstrings                                       Pelvic Dysfunction Evaluation:          Abdominal Wall:  Abdominal wall pelvic: MF restictions inf ribs  B and R LQ         Pelvic Clock Exam:    Ischiocavernosis pain:  -  Bulbocavernosis pain:  -  Transverse Perineal:  -  Levator ANI:  -  Perineal Body:  -  SI Provocation:    Positive for: ASLR        External Assessment:    Skin Condition:  Normal      Tissue Symmetry:  Normal  Introitus:  Normal  Muscle Contraction/Perineal Mobility:  Elevation and urogential triangle descent  Internal Assessment:  Internal assessment pelvic: R OI 3/10 PL to palpation, L LA 3/10, L OI 3/10 PL to palpation, no increased redness noted in vestibule.  Sensory Exam:  Normal  Contraction/Grade:  Fair  squeeze, definite lift (3)          SEMG Biofeedback:  Semg biofeedback pelvic: deferred to later date if needed for NMR.                  Additional History:    Number of Pregnancies: 0  Number of Live Births: 0  Caffeine Consumption:  0          Hip Evaluation  HIP AROM:  : IR and ER limited L. : limited hip IR R.                    Hip Strength:    Flexion:   Left: 5/5   Pain:  Right: 5/5   Pain:                    Extension:  Left: 4-/5  Pain:Right: 4+/5    Pain:    Abduction:  Left: 5/5     Pain:Right: 5/5    Pain:    Internal Rotation:  Left: 5/5    Pain:Right: 5/5   Pain:  External Rotation:  Left: 5/5   Pain:  Right: 4/5   Pain:  Knee Flexion:  Left: 5/5   Pain:Right: 5/5   Pain:  Knee Extension:  Left: 5/5   Pain:Right: 5/5    Pain:                       General     ROS    Assessment/Plan:    Patient is a 46 year old female with pelvic complaints.    Patient has the following significant findings with corresponding treatment plan.                Diagnosis 1:  Pelvic pain, urinary urgency/frequency, constipation  Pain -  hot/cold therapy, manual therapy, self management, education and home program  Decreased ROM/flexibility - manual therapy, therapeutic exercise and home program  Decreased strength - therapeutic exercise, therapeutic activities and home program  Impaired muscle performance - biofeedback, neuro re-education and home program  Decreased function - therapeutic activities and home program    Therapy Evaluation Codes:   1) History comprised of:   Personal factors that impact the plan of care:      Time since onset of symptoms.    Comorbidity factors that impact the plan of care are:      Menopausal.     Medications impacting care: Anti-depressant and HRT.  2) Examination of Body Systems comprised of:   Body structures and functions that impact the plan of care:      Pelvis.   Activity limitations that impact the plan of care are:      Sitting, Sleeping, Laying down, Frequency, Urgency and  constipation.  3) Clinical presentation characteristics are:   Evolving/Changing.  4) Decision-Making    Moderate complexity using standardized patient assessment instrument and/or measureable assessment of functional outcome.  Cumulative Therapy Evaluation is: Moderate complexity.    Previous and current functional limitations:  (See Goal Flow Sheet for this information)    Short term and Long term goals: (See Goal Flow Sheet for this information)     Communication ability:  Patient appears to be able to clearly communicate and understand verbal and written communication and follow directions correctly.  Treatment Explanation - The following has been discussed with the patient:   RX ordered/plan of care  Anticipated outcomes  Possible risks and side effects  This patient would benefit from PT intervention to resume normal activities.   Rehab potential is good.    Frequency:  1 X week, once daily  Duration:  for 6 weeks  Discharge Plan:  Achieve all LTG.  Independent in home treatment program.  Reach maximal therapeutic benefit.    Please refer to the daily flowsheet for treatment today, total treatment time and time spent performing 1:1 timed codes.

## 2021-04-07 ENCOUNTER — PRE VISIT (OUTPATIENT)
Dept: NEUROLOGY | Facility: CLINIC | Age: 46
End: 2021-04-07

## 2021-04-07 RX ORDER — DIAZEPAM 2.5 MG/.5ML
GEL RECTAL
Qty: 5 SUPPOSITORY | Refills: 0 | Status: SHIPPED | OUTPATIENT
Start: 2021-04-07 | End: 2021-06-08

## 2021-04-07 NOTE — TELEPHONE ENCOUNTER
FUTURE VISIT INFORMATION      FUTURE VISIT INFORMATION:    Date: 4/9/2021    Time: 8am    [place] [facility]  REFERRAL INFORMATION:    Referring provider:  Dr. Levin    Referring providers clinic:  [facility]     Reason for visit/diagnosis  Pelvic Pain     RECORDS REQUESTED FROM:       Clinic name Comments Records Status Imaging Status   Internal Dr. Levin-3/8/2021 [facility] N/A

## 2021-04-09 ENCOUNTER — PRE VISIT (OUTPATIENT)
Dept: UROLOGY | Facility: CLINIC | Age: 46
End: 2021-04-09

## 2021-04-09 ENCOUNTER — OFFICE VISIT (OUTPATIENT)
Dept: NEUROLOGY | Facility: CLINIC | Age: 46
End: 2021-04-09
Attending: UROLOGY
Payer: COMMERCIAL

## 2021-04-09 VITALS
BODY MASS INDEX: 25.18 KG/M2 | RESPIRATION RATE: 16 BRPM | HEIGHT: 69 IN | OXYGEN SATURATION: 98 % | WEIGHT: 170 LBS | SYSTOLIC BLOOD PRESSURE: 115 MMHG | HEART RATE: 80 BPM | DIASTOLIC BLOOD PRESSURE: 79 MMHG

## 2021-04-09 DIAGNOSIS — M54.16 LUMBAR RADICULOPATHY: ICD-10-CM

## 2021-04-09 DIAGNOSIS — M54.16 LUMBAR RADICULOPATHY: Primary | ICD-10-CM

## 2021-04-09 LAB
DEPRECATED CALCIDIOL+CALCIFEROL SERPL-MC: 26 UG/L (ref 20–75)
VIT B12 SERPL-MCNC: 618 PG/ML (ref 193–986)

## 2021-04-09 PROCEDURE — 99205 OFFICE O/P NEW HI 60 MIN: CPT | Performed by: PSYCHIATRY & NEUROLOGY

## 2021-04-09 PROCEDURE — 82306 VITAMIN D 25 HYDROXY: CPT | Mod: 90 | Performed by: PATHOLOGY

## 2021-04-09 PROCEDURE — 99417 PROLNG OP E/M EACH 15 MIN: CPT | Performed by: PSYCHIATRY & NEUROLOGY

## 2021-04-09 PROCEDURE — 84207 ASSAY OF VITAMIN B-6: CPT | Mod: 90 | Performed by: PATHOLOGY

## 2021-04-09 PROCEDURE — 82607 VITAMIN B-12: CPT | Performed by: PATHOLOGY

## 2021-04-09 PROCEDURE — 36415 COLL VENOUS BLD VENIPUNCTURE: CPT | Performed by: PATHOLOGY

## 2021-04-09 PROCEDURE — 99000 SPECIMEN HANDLING OFFICE-LAB: CPT | Performed by: PATHOLOGY

## 2021-04-09 RX ORDER — DULOXETIN HYDROCHLORIDE 20 MG/1
CAPSULE, DELAYED RELEASE ORAL
Qty: 111 CAPSULE | Refills: 0 | Status: SHIPPED | OUTPATIENT
Start: 2021-04-09 | End: 2021-05-19

## 2021-04-09 RX ORDER — ESTRADIOL 0.1 MG/G
CREAM VAGINAL
COMMUNITY
Start: 2021-01-15

## 2021-04-09 ASSESSMENT — MIFFLIN-ST. JEOR: SCORE: 1472.55

## 2021-04-09 ASSESSMENT — PAIN SCALES - GENERAL: PAINLEVEL: MODERATE PAIN (4)

## 2021-04-09 NOTE — TELEPHONE ENCOUNTER
Reason for Visit: Cystoscopy    Diagnosis: overactive bladder, urgency, frequency, and well as bladder and vaginal pain    Orders/Procedures/Records: in system    Contact Patient: n/a    Rooming Requirements: UA dip prior to getting ready for cystoscopy. If positive for Leuks and/or Nitrites, will not do cystoscopy. If positive, send urine for official UA / UC.      Henna Meyer LPN  04/09/21  9:52 AM

## 2021-04-09 NOTE — PROGRESS NOTES
[facility] Neurology Consultation    Shala Morales MRN# [id number]   Age: 46 year old YOB: 1975     Requesting physician: Gwen Mcintosh     Reason for Consultation: pelvic pain      History of Presenting Symptoms:   Shala Morales is a 46 year old female who presents today for evaluation of pelvic pain.  The patient was seen with Urologist, Dr. 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  It was reported that in 11/2020 she developed burning pain during intercourse with trials of metrogel, and treatment for a yeast infection.  Her pain progressed to become worse in the vagina, urethra, and bladder with use of steroids and bicarb into the bladder helping her symptoms.  Urinary frequency improved with use of gabapentin (20 times per day to 10) when lowering the dose to 300 qnoon, 400 mg QS.  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  MRI lumbosacral plexus was reported as normal 3/10/2021    Medications tried:  Gabapentin 400 mg TID - blurry vision.   Nortriptyline - no help, but on 20 mg every day. Dry eyes reported.      Today, the patient does note that her primary concerns of that of pain in the vagina, and bladder senility.  The only superficial sensory issues she has noted came from a tingling over the vestibule (more sensitivity on the left versus the right).  The patient feels that her symptoms are b/l mostly, and she never noted any lesions/vesicular lesions.      There was a report of weakness in her legs reported in 08/2019.  She awoke with a sudden pain on the right lateral malloli. Initially the pain was focal to that location, but over a week or two she developed tingling and pain from her buttocks to her leg and in the posterior portions of her thigh and calf.  She  indicated her foot felt heavy, but she didn't note any foot drop. She indicates she had a hard time lifting her right leg forward.  Over time, her symptoms improved but she still notes some occasional tightness in her hamstrings, and pain of her right foot.     The patient has had noted PT evaluations for tight musculature in her groin and hamstrings. 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      Past Medical History:     Patient Active Problem List   Diagnosis     HPV in female     Symptomatic menopausal or female climacteric states     Biliary sludge determined by ultrasound     History of hypertension     History of depression     History of alcoholism (H)     Intermittent right upper quadrant abdominal pain     Lung granuloma (H)     Vegan diet     Hyperlipidemia LDL goal <130     Lumbar radiculopathy     Pelvic pain in female     Urinary urgency     Urinary frequency     Constipation     Past Medical History:   Diagnosis Date     Depressive disorder      History of alcoholism (H) 4/17/2018     History of depression 4/17/2018     HPV in female 4/17/2018     HTN (hypertension)    - rosacea (?)     Past Surgical History:   No major surgeries reported     Social History:   No heavy smoking, no drinking, was vegan but now eats eggs and subtracted tofu     Family History:     Family History   Problem Relation Age of Onset     Thyroid Disease Mother      Hypertension Mother      Diabetes Father      Hypertension Father      Depression Father      Mental Illness Father      Obesity Father      Thyroid Disease Maternal Grandmother      Hypertension Maternal Grandmother      Hypertension Maternal Grandfather      Hypertension Paternal Grandmother      Diabetes Paternal Grandfather      Prostate Cancer Paternal Grandfather      Hypertension  "Paternal Grandfather         Medications:     Current Outpatient Medications   Medication Sig     Cyanocobalamin (VITAMIN B-12) 5000 MCG LOZG Take 5,000 mcg by mouth once a week     diazepam 10 mg SUPP Place vaginally or rectally daily as needed.     fish oil-omega-3 fatty acids 1000 MG capsule Take 2 g by mouth daily     gabapentin (NEURONTIN) 100 MG capsule Take 3 capsules (300 mg) by mouth every morning AND 4 capsules (400 mg) 2 times daily.     ivermectin (SOOLANTRA) 1 % cream      lidocaine (XYLOCAINE) 2 % external gel APPLY A SMALL AMOUNT TO THE AFFECTED AREA TOPICALLY TWICE DAILY     nortriptyline (PAMELOR) 10 MG capsule Take 2 capsules (20 mg) by mouth At Bedtime     olopatadine (PATANOL) 0.1 % ophthalmic solution INT 1 GTT IN OU BID     prednisoLONE acetate (PRED FORTE) 1 % ophthalmic suspension INSTILL 1 DROP BY OPHTHALMIC ROUTE 2 TIMES DAILY FOR 2 WEEKS, ONCE DAILY FOR 2 WEEKS INTO BOTH EYES     valACYclovir (VALTREX) 1000 mg tablet Take 1 tablet (1,000 mg) by mouth 2 times daily for 10 days     vitamin D3 (CHOLECALCIFEROL) 2000 units (50 mcg) tablet Take 1 tablet by mouth daily      Allergies:   No Known Allergies     Review of Systems:   A comprehensive 10 point review of systems (constitutional, ENT, cardiac, peripheral vascular, lymphatic, respiratory, GI, , Musculoskeletal, skin, Neurological) was performed and found to be negative except as described in this note.      Physical Exam:   Vitals: /79   Pulse 80   Resp 16   Ht 1.74 m (5' 8.5\")   Wt 77.1 kg (170 lb)   SpO2 98%   BMI 25.47 kg/m     General: Seated comfortably in no acute distress.  HEENT: Neck supple with normal range of motion. No paracervical muscle tenderness or tightness.  Optic discs sharp and vasculature normal on funduscopic exam.   Heart: Regular rate  Lungs: breathing comfortably  GI: Non tender  Extremities: no edema  Skin: No rashes  Neurologic:     Mental Status: Fully alert, attentive and oriented. Speech clear " and fluent, no paraphasic errors.      Cranial Nerves: Visual fields intact. PERRL. EOMI with normal smooth pursuit. Facial sensation intact/symmetric. Facial movements symmetric. Hearing not formally tested but intact to conversation. Palate elevation symmetric, uvula midline. No dysarthria. Shoulder shrug strong bilaterally. Tongue protrusion midline.     Motor: No tremors or other abnormal movements observed. Muscle tone normal throughout. No pronator drift. Normal/symmetric rapid finger tapping. Strength 5/5 throughout upper and lower extremities except for slight HF weakness on right side (4+/5). Pain with straight leg raise and straight leg depression on the right side.     Deep Tendon Reflexes: 2+ left but 3+ right biceps, 2+ brachioradials b/l, 2+ triceps b/l, 2+ patellar and achilles b/l. No clonus. Toes downgoing bilaterally.     Sensory: Intact/symmetric to light touch, pinprick, temperature, vibration and proprioception throughout upper and lower extremities. Negative Romberg.  Report of hypersensitization on medial left thigh and medial leg, no deficits noted.     Coordination: Finger-nose-finger and heel-shin intact without dysmetria. Rapid alternating movements intact/symmetric with normal speed and rhythm.     Gait: Normal, steady casual gait. Able to walk on toes, heels and tandem without difficulty.         Data: Pertinent prior to visit   Imaging:  MRI lumbosacral plexus: 3/10/2021  Findings: No definite mass or abnormality in the region of the  lumbosacral plexus is noted. The visualized lower lumbar and sacral  spinal canal and neural foramina are patent. The marrow signal is  Unremarkable. Postcontrast images do not demonstrate any abnormal enhancement of the  paraspinous tissues, visualized vertebra, or within the visualized  portion of the thecal sac.                                                     Impression: No lumbosacral plexus abnormality is identified.         Assessment and Plan:    Assessment:  - Chronic clinical description of L4 radiculopathy on right  - Possible L1, or L2 radicular process leading to Ilioinguinal and genitofemoral related pain on left and right  - Perineal nerve distrubution like pain, clinically reported b/l  - Clinically described femoral (L2-3) medial thigh pain on left    The patient had a seemingly radicular process in 2019 leading to right sided symptoms on her leg in an L4 distribution, and now has acute onset neuralgia pain in multiple nerve distributions (b/l pudendal - perineal, ilioinguinal right, genitofemoral right, left medial cutaneous) with a range of distributions (L1 through S3).  Notable weakness is in L1 on the right side, but sensory symptoms are difficult to truly pin down to one region.  Without image findings on lumbosacral plexus imaging, I would think looking further up into the lumbar cord would be helpful to see if there is an issue of the neuro-foramina or event L1-2 region of the cord (entrapment, cauda equina, other).  If this image is negative, I may agree that her symptoms are best described as a possible neuralgia of sorts to an atypical sensory distribution related to perineal nerve.  For symptom management today, I would suggest that the patient continue gabapentin, stop nortriptyline (dry eyes, side effects), and start duloxetine for neuropathic pain.      On another note, the patient had hyper-reflexia on her right arm, and this isn't explained by a lumbar process.  The patient denied major clinical signs of brain trauma, spinal injury, or demyelinating disorders.  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  We discussed that should she develop any further focal neurlogical deficits in the arms, legs, eyes, face, speech, vision, then I would have a low threshold for obtaining an image of her brain and cervical spine to look for  potential longstanding etiology.     Plan:  Gabapentin 300/400/400  Nortriptyline 20 mg at bedtime, discontinue  Duloxetine titration to 60 mg at bedtime  MRI lumbar spine w/wout contrast    Follow up in Neurology clinic in 4-8 weeks or earlier as needed should new concerns arise.    DONATO Zhou D.O.   of Neurology    Total time (89 min) in this patient encounter was spent on pre-charting, counseling and/or coordination of care. We reviewed diagnostic results, impressions, and discussed other possible tests if symptoms do not improve. We discussed the implications of the diagnosis, as well as risks and benefits of management options. We reviewed treatment instructions and our scheduled follow-up as specified in the discharge plan. We also discussed the importance of compliance with the chosen course of treatment. The patient is in agreement with this plan and has no further questions.

## 2021-04-09 NOTE — PATIENT INSTRUCTIONS
I suspect you had an old injury to a nerve in your back (Right side, L1-4 possibly) and it is possible some of your current symptoms are related to this.  Further investigation with Imaging of the lumbar spine is needed, as well as some serum studies to look for common neuropathies other than post-herpetic neuralgia.    For symptom management:  Stop nortriptyline: halve the dose this week, then stop next week.     Once stopped, start Duloxetine    Duloxetine:   20 mg at bedtime for 7 days, then  40 mg at bedtime for 7 days, then  60 mg at bedtime for 30 days    Continue Gabapentin at your current dose

## 2021-04-09 NOTE — LETTER
4/9/2021       RE: Shala Morales  [address] [email or web address]  [place] [facility] [id number]     Dear Colleague,    Thank you for referring your patient, Shala Morales, to the [facility]UROLOG[facility] [place] [facility]. Please see a copy of my visit note below.    [facility] Neurology Consultation    Shala Morales MRN# [id number]   Age: 46 year old YOB: 1975     Requesting physician: Gwen Mcintosh     Reason for Consultation: pelvic pain      History of Presenting Symptoms:   Shala Morales is a 46 year old female who presents today for evaluation of pelvic pain.  The patient was seen with Urologist, Dr. 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  It was reported that in 11/2020 she developed burning pain during intercourse with trials of metrogel, and treatment for a yeast infection.  Her pain progressed to become worse in the vagina, urethra, and bladder with use of steroids and bicarb into the bladder helping her symptoms.  Urinary frequency improved with use of gabapentin (20 times per day to 10) when lowering the dose to 300 qnoon, 400 mg QS.  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  MRI lumbosacral plexus was reported as normal 3/10/2021    Medications tried:  Gabapentin 400 mg TID - blurry vision.   Nortriptyline - no help, but on 20 mg every day. Dry eyes reported.      Today, the patient does note that her primary concerns of that of pain in the vagina, and bladder senility.  The only superficial sensory issues she has noted came from a tingling over the vestibule (more sensitivity on the left versus the right).  The patient feels that her symptoms are b/l mostly, and she never noted any lesions/vesicular lesions.       There was a report of weakness in her legs reported in 08/2019.  She awoke with a sudden pain on the right lateral malloli. Initially the pain was focal to that location, but over a week or two she developed tingling and pain from her buttocks to her leg and in the posterior portions of her thigh and calf.  She indicated her foot felt heavy, but she didn't note any foot drop. She indicates she had a hard time lifting her right leg forward.  Over time, her symptoms improved but she still notes some occasional tightness in her hamstrings, and pain of her right foot.     The patient has had noted PT evaluations for tight musculature in her groin and hamstrings. 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      Past Medical History:     Patient Active Problem List   Diagnosis     HPV in female     Symptomatic menopausal or female climacteric states     Biliary sludge determined by ultrasound     History of hypertension     History of depression     History of alcoholism (H)     Intermittent right upper quadrant abdominal pain     Lung granuloma (H)     Vegan diet     Hyperlipidemia LDL goal <130     Lumbar radiculopathy     Pelvic pain in female     Urinary urgency     Urinary frequency     Constipation     Past Medical History:   Diagnosis Date     Depressive disorder      History of alcoholism (H) 4/17/2018     History of depression 4/17/2018     HPV in female 4/17/2018     HTN (hypertension)    - rosacea (?)     Past Surgical History:   No major surgeries reported     Social History:   No heavy smoking, no drinking, was vegan but now eats eggs and subtracted tofu     Family History:     Family History   Problem Relation Age of Onset     Thyroid Disease Mother      Hypertension Mother      Diabetes Father      Hypertension Father       "Depression Father      Mental Illness Father      Obesity Father      Thyroid Disease Maternal Grandmother      Hypertension Maternal Grandmother      Hypertension Maternal Grandfather      Hypertension Paternal Grandmother      Diabetes Paternal Grandfather      Prostate Cancer Paternal Grandfather      Hypertension Paternal Grandfather         Medications:     Current Outpatient Medications   Medication Sig     Cyanocobalamin (VITAMIN B-12) 5000 MCG LOZG Take 5,000 mcg by mouth once a week     diazepam 10 mg SUPP Place vaginally or rectally daily as needed.     fish oil-omega-3 fatty acids 1000 MG capsule Take 2 g by mouth daily     gabapentin (NEURONTIN) 100 MG capsule Take 3 capsules (300 mg) by mouth every morning AND 4 capsules (400 mg) 2 times daily.     ivermectin (SOOLANTRA) 1 % cream      lidocaine (XYLOCAINE) 2 % external gel APPLY A SMALL AMOUNT TO THE AFFECTED AREA TOPICALLY TWICE DAILY     nortriptyline (PAMELOR) 10 MG capsule Take 2 capsules (20 mg) by mouth At Bedtime     olopatadine (PATANOL) 0.1 % ophthalmic solution INT 1 GTT IN OU BID     prednisoLONE acetate (PRED FORTE) 1 % ophthalmic suspension INSTILL 1 DROP BY OPHTHALMIC ROUTE 2 TIMES DAILY FOR 2 WEEKS, ONCE DAILY FOR 2 WEEKS INTO BOTH EYES     valACYclovir (VALTREX) 1000 mg tablet Take 1 tablet (1,000 mg) by mouth 2 times daily for 10 days     vitamin D3 (CHOLECALCIFEROL) 2000 units (50 mcg) tablet Take 1 tablet by mouth daily      Allergies:   No Known Allergies     Review of Systems:   A comprehensive 10 point review of systems (constitutional, ENT, cardiac, peripheral vascular, lymphatic, respiratory, GI, , Musculoskeletal, skin, Neurological) was performed and found to be negative except as described in this note.      Physical Exam:   Vitals: /79   Pulse 80   Resp 16   Ht 1.74 m (5' 8.5\")   Wt 77.1 kg (170 lb)   SpO2 98%   BMI 25.47 kg/m     General: Seated comfortably in no acute distress.  HEENT: Neck supple with normal " range of motion. No paracervical muscle tenderness or tightness.  Optic discs sharp and vasculature normal on funduscopic exam.   Heart: Regular rate  Lungs: breathing comfortably  GI: Non tender  Extremities: no edema  Skin: No rashes  Neurologic:     Mental Status: Fully alert, attentive and oriented. Speech clear and fluent, no paraphasic errors.      Cranial Nerves: Visual fields intact. PERRL. EOMI with normal smooth pursuit. Facial sensation intact/symmetric. Facial movements symmetric. Hearing not formally tested but intact to conversation. Palate elevation symmetric, uvula midline. No dysarthria. Shoulder shrug strong bilaterally. Tongue protrusion midline.     Motor: No tremors or other abnormal movements observed. Muscle tone normal throughout. No pronator drift. Normal/symmetric rapid finger tapping. Strength 5/5 throughout upper and lower extremities except for slight HF weakness on right side (4+/5). Pain with straight leg raise and straight leg depression on the right side.     Deep Tendon Reflexes: 2+ left but 3+ right biceps, 2+ brachioradials b/l, 2+ triceps b/l, 2+ patellar and achilles b/l. No clonus. Toes downgoing bilaterally.     Sensory: Intact/symmetric to light touch, pinprick, temperature, vibration and proprioception throughout upper and lower extremities. Negative Romberg.  Report of hypersensitization on medial left thigh and medial leg, no deficits noted.     Coordination: Finger-nose-finger and heel-shin intact without dysmetria. Rapid alternating movements intact/symmetric with normal speed and rhythm.     Gait: Normal, steady casual gait. Able to walk on toes, heels and tandem without difficulty.         Data: Pertinent prior to visit   Imaging:  MRI lumbosacral plexus: 3/10/2021  Findings: No definite mass or abnormality in the region of the  lumbosacral plexus is noted. The visualized lower lumbar and sacral  spinal canal and neural foramina are patent. The marrow signal  is  Unremarkable. Postcontrast images do not demonstrate any abnormal enhancement of the  paraspinous tissues, visualized vertebra, or within the visualized  portion of the thecal sac.                                                     Impression: No lumbosacral plexus abnormality is identified.         Assessment and Plan:   Assessment:  - Chronic clinical description of L4 radiculopathy on right  - Possible L1, or L2 radicular process leading to Ilioinguinal and genitofemoral related pain on left and right  - Perineal nerve distrubution like pain, clinically reported b/l  - Clinically described femoral (L2-3) medial thigh pain on left    The patient had a seemingly radicular process in 2019 leading to right sided symptoms on her leg in an L4 distribution, and now has acute onset neuralgia pain in multiple nerve distributions (b/l pudendal - perineal, ilioinguinal right, genitofemoral right, left medial cutaneous) with a range of distributions (L1 through S3).  Notable weakness is in L1 on the right side, but sensory symptoms are difficult to truly pin down to one region.  Without image findings on lumbosacral plexus imaging, I would think looking further up into the lumbar cord would be helpful to see if there is an issue of the neuro-foramina or event L1-2 region of the cord (entrapment, cauda equina, other).  If this image is negative, I may agree that her symptoms are best described as a possible neuralgia of sorts to an atypical sensory distribution related to perineal nerve.  For symptom management today, I would suggest that the patient continue gabapentin, stop nortriptyline (dry eyes, side effects), and start duloxetine for neuropathic pain.      On another note, the patient had hyper-reflexia on her right arm, and this isn't explained by a lumbar process.  The patient denied major clinical signs of brain trauma, spinal injury, or demyelinating disorders.  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  We discussed that should she develop any further focal neurlogical deficits in the arms, legs, eyes, face, speech, vision, then I would have a low threshold for obtaining an image of her brain and cervical spine to look for potential longstanding etiology.     Plan:  Gabapentin 300/400/400  Nortriptyline 20 mg at bedtime, discontinue  Duloxetine titration to 60 mg at bedtime  MRI lumbar spine w/wout contrast    Follow up in Neurology clinic in 4-8 weeks or earlier as needed should new concerns arise.    DONATO Zhou D.O.   of Neurology    Total time (89 min) in this patient encounter was spent on pre-charting, counseling and/or coordination of care. We reviewed diagnostic results, impressions, and discussed other possible tests if symptoms do not improve. We discussed the implications of the diagnosis, as well as risks and benefits of management options. We reviewed treatment instructions and our scheduled follow-up as specified in the discharge plan. We also discussed the importance of compliance with the chosen course of treatment. The patient is in agreement with this plan and has no further questions.

## 2021-04-13 ENCOUNTER — THERAPY VISIT (OUTPATIENT)
Dept: PHYSICAL THERAPY | Facility: CLINIC | Age: 46
End: 2021-04-13
Payer: COMMERCIAL

## 2021-04-13 DIAGNOSIS — N94.819 VULVODYNIA: ICD-10-CM

## 2021-04-13 DIAGNOSIS — K59.00 CONSTIPATION: ICD-10-CM

## 2021-04-13 DIAGNOSIS — R35.0 URINARY FREQUENCY: ICD-10-CM

## 2021-04-13 DIAGNOSIS — R39.15 URINARY URGENCY: ICD-10-CM

## 2021-04-13 DIAGNOSIS — R10.2 PELVIC PAIN IN FEMALE: ICD-10-CM

## 2021-04-13 LAB — VIT B6 SERPL-MCNC: 24.1 NMOL/L (ref 20–125)

## 2021-04-13 PROCEDURE — 97112 NEUROMUSCULAR REEDUCATION: CPT | Mod: GP | Performed by: PHYSICAL THERAPIST

## 2021-04-13 PROCEDURE — 97140 MANUAL THERAPY 1/> REGIONS: CPT | Mod: GP | Performed by: PHYSICAL THERAPIST

## 2021-04-13 PROCEDURE — 97110 THERAPEUTIC EXERCISES: CPT | Mod: GP | Performed by: PHYSICAL THERAPIST

## 2021-04-13 RX ORDER — NORTRIPTYLINE HCL 10 MG
CAPSULE ORAL
Qty: 30 CAPSULE | Refills: 0 | OUTPATIENT
Start: 2021-04-13

## 2021-04-14 ENCOUNTER — OFFICE VISIT (OUTPATIENT)
Dept: UROLOGY | Facility: CLINIC | Age: 46
End: 2021-04-14
Payer: COMMERCIAL

## 2021-04-14 VITALS — HEIGHT: 69 IN | WEIGHT: 170 LBS | BODY MASS INDEX: 25.18 KG/M2

## 2021-04-14 DIAGNOSIS — R39.15 URINARY URGENCY: ICD-10-CM

## 2021-04-14 DIAGNOSIS — R35.0 FREQUENCY OF URINATION: ICD-10-CM

## 2021-04-14 DIAGNOSIS — R10.2 PELVIC PAIN IN FEMALE: Primary | ICD-10-CM

## 2021-04-14 PROCEDURE — 52000 CYSTOURETHROSCOPY: CPT | Performed by: UROLOGY

## 2021-04-14 RX ORDER — LIDOCAINE HYDROCHLORIDE 20 MG/ML
JELLY TOPICAL ONCE
Status: COMPLETED | OUTPATIENT
Start: 2021-04-14 | End: 2021-04-14

## 2021-04-14 RX ORDER — DIAZEPAM 2.5 MG/.5ML
5 GEL RECTAL
Qty: 20 SUPPOSITORY | Refills: 0 | Status: SHIPPED | OUTPATIENT
Start: 2021-04-14 | End: 2022-01-19

## 2021-04-14 RX ORDER — DIAZEPAM 5 MG
5 TABLET ORAL EVERY 8 HOURS PRN
Qty: 5 TABLET | Refills: 0 | Status: SHIPPED | OUTPATIENT
Start: 2021-04-14 | End: 2021-06-08

## 2021-04-14 RX ADMIN — LIDOCAINE HYDROCHLORIDE: 20 JELLY TOPICAL at 09:40

## 2021-04-14 ASSESSMENT — PAIN SCALES - GENERAL: PAINLEVEL: MILD PAIN (3)

## 2021-04-14 ASSESSMENT — MIFFLIN-ST. JEOR: SCORE: 1472.55

## 2021-04-14 NOTE — PROGRESS NOTES
Reason for Visit:  Cystoscopy    Clinical Data: Ms. Shala Morales is a 46 year old female with a hx of lower urinary tract symptoms. Her symptoms began after intercourse December 2020 in which she initially had burning pain in the vaginal area. She was treated with 2 week course of Metrogel for bacterial vaginosis. While on Metrogel, her symptoms escalated to include burning sensation in the vaginal area and burning with voids. She also experienced increased urinary frequency and urgency, voiding over 20 times per day and pain, rating pain as high as 9/10 in intensity. 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 At that time, she received bladder instillation with heparin, lidocaine, and sodium bicarb. Her pain improved while the medication was in the bladder however following void, pain returned. Due to thoughts of her symptoms being caused by post herpetic neuralgia, she was started onn gabapentin. As she titrated up, she developed blurry vision. She remains on 300 mg of gabapentin in the morning, 400 mg in the afternoon and 40 mg in the evening. Additionally, she was started on nortriptyline which she has increased to 20 mg per day.     She was treated empirically with Valtrex for HSV. She was provided prescription for vaginal Valium which she continues to use as needed. She had lumbar spine MRI which was normal. Additionally, she had evaluation with Gynecology with negative Pap smear. She was found to have atrophic vaginal tissue and is on a compound it estrogen replacement cream. She had been using daily with some improvement in symptoms and decreased to twice weekly. With decreasing frequency, her symptoms worsened and she is now back to utilizing estrogen cream daily. She notes that her symptoms wax and wane. She reports her pain increases throughout the night. She sleeps up right as this helps decrease the pain in her perineal/bladder/pelvic area.  Currently, she is voiding approximately 10 times throughout the day as she has had improvement with the use of gabapentin and nortriptyline. She denies dysuria at present. No gross hematuria or recent urinary tract infections. She reports strong urinary flow however is slow and prolonged at end of void. She does report constipation. She has irregular menses, and is perimenopausal. She has not had sexual activity since December of 2020 given onset of pain.    She completed 2 day bladder diary which demonstrates voided volumes ranging from 4 to 12 oz. Fluid intake consisted of only water. She notes that she has dramatically changed her diet to include only bland items.     She presents for cystoscopy to further evaluate.    Cystoscopy procedure:  Pt. Was consented and placed in the lithotomy position.  She was cleaned and preparred in the usual fashion.  Lidocain gel was inserted into the urethra and given time to take effect.  A 16 fr flexible cystoscope was then inserted through the urethra and into the bladder.  The urethra was wnl.  The bladder was with 1+ trabeculation.  No tumors, diverticulae, or stones.  Bilateral u/o's were effluxing clear urine.  The cystoscope was then withdrawn.  The pt. Tolerated the procedure well.    A/P:  46 year old female with urinary urgency and frequency as well as pelvic pain and vulvodynia.  She has start PT and this aggravates her pain at times.  We discussed SNS as well and she would like some information on this.  -continue with PT  -give info on SNS  -vaginal valium  -f/u in 3 months to reassess.    Thank you for allowing me to participate in the care of  Ms. Shala Morales and I will keep you updated on her progress.    Carrol Levin MD

## 2021-04-14 NOTE — NURSING NOTE
"Chief Complaint   Patient presents with     Cystoscopy     OAB, urgency, frequency, bladder pain       Height 1.74 m (5' 8.5\"), weight 77.1 kg (170 lb), not currently breastfeeding. Body mass index is 25.47 kg/m .    Patient Active Problem List   Diagnosis     HPV in female     Symptomatic menopausal or female climacteric states     Biliary sludge determined by ultrasound     History of hypertension     History of depression     History of alcoholism (H)     Intermittent right upper quadrant abdominal pain     Lung granuloma (H)     Vegan diet     Hyperlipidemia LDL goal <130     Lumbar radiculopathy     Pelvic pain in female     Urinary urgency     Urinary frequency     Constipation       No Known Allergies    Current Outpatient Medications   Medication Sig Dispense Refill     Cyanocobalamin (VITAMIN B-12) 5000 MCG LOZG Take 5,000 mcg by mouth once a week       diazepam 10 mg SUPP Place vaginally or rectally daily as needed. 5 suppository 0     DULoxetine (CYMBALTA) 20 MG capsule Take 1 capsule (20 mg) by mouth At Bedtime for 7 days, THEN 2 capsules (40 mg) At Bedtime for 7 days, THEN 3 capsules (60 mg) At Bedtime. 111 capsule 0     estradiol (ESTRACE) 0.1 MG/GM vaginal cream APPLY SMALL AMOUNT TO AFFECTED AREA DAILY FOR 2 WEEKS       fish oil-omega-3 fatty acids 1000 MG capsule Take 2 g by mouth daily       gabapentin (NEURONTIN) 100 MG capsule Take 3 capsules (300 mg) by mouth every morning AND 4 capsules (400 mg) 2 times daily. 330 capsule 5     ivermectin (SOOLANTRA) 1 % cream        lidocaine (XYLOCAINE) 2 % external gel APPLY A SMALL AMOUNT TO THE AFFECTED AREA TOPICALLY TWICE DAILY       olopatadine (PATANOL) 0.1 % ophthalmic solution INT 1 GTT IN OU BID       prednisoLONE acetate (PRED FORTE) 1 % ophthalmic suspension INSTILL 1 DROP BY OPHTHALMIC ROUTE 2 TIMES DAILY FOR 2 WEEKS, ONCE DAILY FOR 2 WEEKS INTO BOTH EYES       valACYclovir (VALTREX) 1000 mg tablet Take 1 tablet (1,000 mg) by mouth 2 times daily " for 10 days 20 tablet 0     vitamin D3 (CHOLECALCIFEROL) 2000 units (50 mcg) tablet Take 1 tablet by mouth daily         Social History     Tobacco Use     Smoking status: Former Smoker     Packs/day: 1.50     Years: 15.00     Pack years: 22.50     Types: Cigarettes     Start date: 1994     Quit date: 10/1/2019     Years since quittin.5     Smokeless tobacco: Never Used     Tobacco comment: Stopped smoking cigarettes  but then started using E cigarette which I then quit using    Substance Use Topics     Alcohol use: Yes     Drug use: No       Invasive Procedure Safety Checklist:    Procedure: Cystoscopy    Action: Complete sections and checkboxes as appropriate.    Pre-procedure:  1. Patient ID Verified with 2 identifiers ([place] and  or MRN) : YES    2. Procedure and site verified with patient/designee (when able) : YES    3. Accurate consent documentation in medical record : YES    4. H&P (or appropriate assessment) documented in medical record : N/A  H&P must be up to 30 days prior to procedure an updated within 24 hours of                 Procedure as applicable.     5. Relevant diagnostic and radiology test results appropriately labeled and displayed as applicable : YES    6. Blood products, implants, devices, and/or special equipment available for the procedure as applicable : YES    7. Procedure site(s) marked with provider initials [Exclusions: none] : NO    8. Marking not required. Reason : Yes  Procedure does not require site marking    Time Out:     Time-Out performed immediately prior to starting procedure, including verbal and active participation of all team members addressing: YES    1. Correct patient identity.  2. Confirmed that the correct side and site are marked.  3. An accurate procedure to be done.  4. Agreement on the procedure to be done.  5. Correct patient position.  6. Relevant images and results are properly labeled and appropriately displayed.  7. The need to administer  antibiotics or fluids for irrigation purposes during the procedure as applicable.  8. Safety precautions based on patient history or medication use.    During Procedure: Verification of correct person, site, and procedure occurs any time the responsibility for care of the patient is transferred to another member of the care team.    The following medication was given:     MEDICATION:  Lidocaine without epinephrine 2% jelly  ROUTE: urethral   SITE: urethral   DOSE: 10 mL  LOT #: [id number]  : [employer]  EXPIRATION DATE: 10/2022  NDC#: [address]-51   Was there drug waste? No    Prior to med admin, verified patient identity using patient's name and date of birth.  Due to med administration, patient instructed to remain in clinic for 15 minutes  afterwards, and to report any adverse reaction to me immediately.    Drug Amount Wasted:  None.  Vial/Syringe: Micheline Meyer LPN  4/14/2021  9:32 AM

## 2021-04-14 NOTE — LETTER
4/14/2021       RE: Shala Morales  [address] [address]     Dear Colleague,    Thank you for referring your patient, Shala Morales, to the [facility] UROLOGY CLINIC [place] at [facility]. Please see a copy of my visit note below.    Reason for Visit:  Cystoscopy    Clinical Data: Ms. Shala Morales is a 46 year old female with a hx of lower urinary tract symptoms. Her symptoms began after intercourse December 2020 in which she initially had burning pain in the vaginal area. She was treated with 2 week course of Metrogel for bacterial vaginosis. While on Metrogel, her symptoms escalated to include burning sensation in the vaginal area and burning with voids. She also experienced increased urinary frequency and urgency, voiding over 20 times per day and pain, rating pain as high as 9/10 in intensity. 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 At that time, she received bladder instillation with heparin, lidocaine, and sodium bicarb. Her pain improved while the medication was in the bladder however following void, pain returned. Due to thoughts of her symptoms being caused by post herpetic neuralgia, she was started onn gabapentin. As she titrated up, she developed blurry vision. She remains on 300 mg of gabapentin in the morning, 400 mg in the afternoon and 40 mg in the evening. Additionally, she was started on nortriptyline which she has increased to 20 mg per day.     She was treated empirically with Valtrex for HSV. She was provided prescription for vaginal Valium which she continues to use as needed. She had lumbar spine MRI which was normal. Additionally, she had evaluation with Gynecology with negative Pap smear. She was found to have atrophic vaginal tissue and is on a compound it estrogen replacement cream. She had been using daily with some improvement in symptoms and  decreased to twice weekly. With decreasing frequency, her symptoms worsened and she is now back to utilizing estrogen cream daily. She notes that her symptoms wax and wane. She reports her pain increases throughout the night. She sleeps up right as this helps decrease the pain in her perineal/bladder/pelvic area. Currently, she is voiding approximately 10 times throughout the day as she has had improvement with the use of gabapentin and nortriptyline. She denies dysuria at present. No gross hematuria or recent urinary tract infections. She reports strong urinary flow however is slow and prolonged at end of void. She does report constipation. She has irregular menses, and is perimenopausal. She has not had sexual activity since December of 2020 given onset of pain.    She completed 2 day bladder diary which demonstrates voided volumes ranging from 4 to 12 oz. Fluid intake consisted of only water. She notes that she has dramatically changed her diet to include only bland items.     She presents for cystoscopy to further evaluate.    Cystoscopy procedure:  Pt. Was consented and placed in the lithotomy position.  She was cleaned and preparred in the usual fashion.  Lidocain gel was inserted into the urethra and given time to take effect.  A 16 fr flexible cystoscope was then inserted through the urethra and into the bladder.  The urethra was wnl.  The bladder was with 1+ trabeculation.  No tumors, diverticulae, or stones.  Bilateral u/o's were effluxing clear urine.  The cystoscope was then withdrawn.  The pt. Tolerated the procedure well.    A/P:  46 year old female with urinary urgency and frequency as well as pelvic pain and vulvodynia.  She has start PT and this aggravates her pain at times.  We discussed SNS as well and she would like some information on this.  -continue with PT  -give info on SNS  -vaginal valium  -f/u in 3 months to reassess.    Thank you for allowing me to participate in the care of  Ms.  Shala Morales and I will keep you updated on her progress.    Carrol Levin MD

## 2021-04-14 NOTE — PATIENT INSTRUCTIONS
"-continue with PT  -give info on SNS  -vaginal valium  -f/u in 3 months to reassess.      AFTER YOUR CYSTOSCOPY        You have just completed a cystoscopy, or \"cysto\", which allowed your physician to learn more about your bladder (or to remove a stent placed after surgery). We suggest that you continue to avoid caffeine, fruit juice, and alcohol for the next 24 hours, however, you are encouraged to return to your normal activities.         A few things that are considered normal after your cystoscopy:     * Small amount of bleeding (or spotting) that clears within the next 24 hours     * Slight burning sensation with urination     * Sensation to of needing to avoid more frequently     * The feeling of \"air\" in your urine     * Mild discomfort that is relieved with Tylenol        Please contact our office promptly if you:     * Develop a fever above 101 degrees     * Are unable to urinate     * Develop bright red blood that does not stop     * Severe pain or swelling     "

## 2021-04-20 ENCOUNTER — THERAPY VISIT (OUTPATIENT)
Dept: PHYSICAL THERAPY | Facility: CLINIC | Age: 46
End: 2021-04-20
Payer: COMMERCIAL

## 2021-04-20 DIAGNOSIS — R35.0 URINARY FREQUENCY: ICD-10-CM

## 2021-04-20 DIAGNOSIS — K59.00 CONSTIPATION: ICD-10-CM

## 2021-04-20 DIAGNOSIS — R39.15 URINARY URGENCY: ICD-10-CM

## 2021-04-20 DIAGNOSIS — R10.2 PELVIC PAIN IN FEMALE: ICD-10-CM

## 2021-04-20 PROCEDURE — 97140 MANUAL THERAPY 1/> REGIONS: CPT | Mod: GP | Performed by: PHYSICAL THERAPIST

## 2021-04-20 PROCEDURE — 97112 NEUROMUSCULAR REEDUCATION: CPT | Mod: GP | Performed by: PHYSICAL THERAPIST

## 2021-04-27 ENCOUNTER — THERAPY VISIT (OUTPATIENT)
Dept: PHYSICAL THERAPY | Facility: CLINIC | Age: 46
End: 2021-04-27
Payer: COMMERCIAL

## 2021-04-27 DIAGNOSIS — K59.00 CONSTIPATION: ICD-10-CM

## 2021-04-27 DIAGNOSIS — R39.15 URINARY URGENCY: ICD-10-CM

## 2021-04-27 DIAGNOSIS — R10.2 PELVIC PAIN IN FEMALE: ICD-10-CM

## 2021-04-27 DIAGNOSIS — R35.0 URINARY FREQUENCY: ICD-10-CM

## 2021-04-27 PROCEDURE — 97112 NEUROMUSCULAR REEDUCATION: CPT | Mod: GP | Performed by: PHYSICAL THERAPIST

## 2021-04-27 PROCEDURE — 97140 MANUAL THERAPY 1/> REGIONS: CPT | Mod: GP | Performed by: PHYSICAL THERAPIST

## 2021-05-01 ENCOUNTER — ANCILLARY PROCEDURE (OUTPATIENT)
Dept: MRI IMAGING | Facility: CLINIC | Age: 46
End: 2021-05-01
Attending: PSYCHIATRY & NEUROLOGY
Payer: COMMERCIAL

## 2021-05-01 DIAGNOSIS — M54.16 LUMBAR RADICULOPATHY: ICD-10-CM

## 2021-05-01 PROCEDURE — A9585 GADOBUTROL INJECTION: HCPCS | Performed by: RADIOLOGY

## 2021-05-01 PROCEDURE — 72158 MRI LUMBAR SPINE W/O & W/DYE: CPT | Mod: GC | Performed by: RADIOLOGY

## 2021-05-01 RX ORDER — GADOBUTROL 604.72 MG/ML
7.5 INJECTION INTRAVENOUS ONCE
Status: COMPLETED | OUTPATIENT
Start: 2021-05-01 | End: 2021-05-01

## 2021-05-01 RX ADMIN — GADOBUTROL 7.5 ML: 604.72 INJECTION INTRAVENOUS at 11:28

## 2021-05-01 NOTE — DISCHARGE INSTRUCTIONS
MRI Contrast Discharge Instructions    The IV contrast you received today will pass out of your body in your  urine. This will happen in the next 24 hours. You will not feel this process.  Your urine will not change color.    Drink at least 4 extra glasses of water or juice today (unless your doctor  has restricted your fluids). This reduces the stress on your kidneys.  You may take your regular medicines.    If you are on dialysis: It is best to have dialysis today.    If you have a reaction: Most reactions happen right away. If you have  any new symptoms after leaving the hospital (such as hives or swelling),  call your hospital at the correct number below. Or call your family doctor.  If you have breathing distress or wheezing, call 911.    Special instructions: ***    I have read and understand the above information.    Signature:______________________________________ Date:___________    Staff:__________________________________________ Date:___________     Time:__________    [facility] Departments:    ___[place]: [phone]  ___[facility]: [phone]  ___[place]: [phone] ___[facility]: [phone]  ___[facility]: [phone]  ___[facility]: [phone]  ___[phone]  ___[facility]: [phone]  ___[phone]

## 2021-05-04 ENCOUNTER — THERAPY VISIT (OUTPATIENT)
Dept: PHYSICAL THERAPY | Facility: CLINIC | Age: 46
End: 2021-05-04
Payer: COMMERCIAL

## 2021-05-04 DIAGNOSIS — K59.00 CONSTIPATION: ICD-10-CM

## 2021-05-04 DIAGNOSIS — R35.0 URINARY FREQUENCY: ICD-10-CM

## 2021-05-04 DIAGNOSIS — R10.2 PELVIC PAIN IN FEMALE: ICD-10-CM

## 2021-05-04 DIAGNOSIS — R39.15 URINARY URGENCY: ICD-10-CM

## 2021-05-04 PROCEDURE — 97112 NEUROMUSCULAR REEDUCATION: CPT | Mod: GP | Performed by: PHYSICAL THERAPIST

## 2021-05-04 PROCEDURE — 97140 MANUAL THERAPY 1/> REGIONS: CPT | Mod: GP | Performed by: PHYSICAL THERAPIST

## 2021-05-10 PROBLEM — M54.16 LUMBAR RADICULOPATHY: Status: RESOLVED | Noted: 2020-10-11 | Resolved: 2021-05-10

## 2021-05-10 NOTE — PROGRESS NOTES
Subjective:  HPI  Physical Exam                    Objective:  System    Physical Exam    General     ROS    Assessment/Plan:    DISCHARGE REPORT    Progress reporting period is from 10/7/20 to 10/13/20.     SUBJECTIVE  Subjective: Feels a big improvement from last week. Numbness in R leg has decreased. Exercises feel good.    Current Pain level: 4/10   Initial Pain level: 8/10        ;   ,     Patient has failed to return to therapy so current objective findings are unknown.  The subjective and objective information are from the last SOAP note on this patient.    OBJECTIVE  Objective: Unable to GS in supine; able in sitting. R knee valgus with squats.      ASSESSMENT/PLAN  Diagnosis 1:  LBP with radiation into B LEs  Pain -  hot/cold therapy, manual therapy, self management, education, directional preference exercise and home program  Decreased ROM/flexibility - manual therapy, therapeutic exercise and home program  Decreased strength - therapeutic exercise, therapeutic activities and home program  STG/LTGs have been met or progress has been made towards goals:  Yes (See Goal flow sheet completed today.)  Assessment of Progress: The patient has not returned to therapy. Current status is unknown.  Self Management Plans:  Patient has been instructed in a home treatment program.  Patient  has been instructed in self management of symptoms.  Shala continues to require the following intervention to meet STG and LTG's: PT intervention is no longer required to meet STG/LTG.  The patient failed to complete scheduled/ordered appointments so current information is unknown.  We will discharge this patient from PT.    Recommendations:  This patient is ready to be discharged from therapy and continue their home treatment program.    Please refer to the daily flowsheet for treatment today, total treatment time and time spent performing 1:1 timed codes.

## 2021-05-11 ENCOUNTER — THERAPY VISIT (OUTPATIENT)
Dept: PHYSICAL THERAPY | Facility: CLINIC | Age: 46
End: 2021-05-11
Payer: COMMERCIAL

## 2021-05-11 DIAGNOSIS — R35.0 URINARY FREQUENCY: ICD-10-CM

## 2021-05-11 DIAGNOSIS — R10.2 PELVIC PAIN IN FEMALE: ICD-10-CM

## 2021-05-11 DIAGNOSIS — K59.00 CONSTIPATION: ICD-10-CM

## 2021-05-11 DIAGNOSIS — R39.15 URINARY URGENCY: ICD-10-CM

## 2021-05-11 PROCEDURE — 97112 NEUROMUSCULAR REEDUCATION: CPT | Mod: GP | Performed by: PHYSICAL THERAPIST

## 2021-05-11 PROCEDURE — 97140 MANUAL THERAPY 1/> REGIONS: CPT | Mod: GP | Performed by: PHYSICAL THERAPIST

## 2021-05-19 DIAGNOSIS — M54.16 LUMBAR RADICULOPATHY: ICD-10-CM

## 2021-05-19 RX ORDER — DULOXETIN HYDROCHLORIDE 60 MG/1
60 CAPSULE, DELAYED RELEASE ORAL AT BEDTIME
Qty: 90 CAPSULE | Refills: 3 | Status: SHIPPED | OUTPATIENT
Start: 2021-05-19 | End: 2021-07-05

## 2021-06-08 DIAGNOSIS — M62.89 PELVIC FLOOR DYSFUNCTION IN FEMALE: ICD-10-CM

## 2021-06-08 DIAGNOSIS — R10.2 PELVIC PAIN IN FEMALE: ICD-10-CM

## 2021-06-08 RX ORDER — DIAZEPAM 2.5 MG/.5ML
GEL RECTAL
Qty: 10 SUPPOSITORY | Refills: 3 | Status: SHIPPED | OUTPATIENT
Start: 2021-06-08 | End: 2021-12-15

## 2021-06-09 ENCOUNTER — THERAPY VISIT (OUTPATIENT)
Dept: PHYSICAL THERAPY | Facility: CLINIC | Age: 46
End: 2021-06-09
Payer: COMMERCIAL

## 2021-06-09 DIAGNOSIS — R10.2 PELVIC PAIN IN FEMALE: ICD-10-CM

## 2021-06-09 DIAGNOSIS — R35.0 URINARY FREQUENCY: ICD-10-CM

## 2021-06-09 DIAGNOSIS — R39.15 URINARY URGENCY: ICD-10-CM

## 2021-06-09 DIAGNOSIS — K59.00 CONSTIPATION: ICD-10-CM

## 2021-06-09 PROCEDURE — 97140 MANUAL THERAPY 1/> REGIONS: CPT | Mod: GP | Performed by: PHYSICAL THERAPIST

## 2021-06-09 PROCEDURE — 97112 NEUROMUSCULAR REEDUCATION: CPT | Mod: GP | Performed by: PHYSICAL THERAPIST

## 2021-06-10 ENCOUNTER — VIRTUAL VISIT (OUTPATIENT)
Dept: NEUROLOGY | Facility: CLINIC | Age: 46
End: 2021-06-10
Payer: COMMERCIAL

## 2021-06-10 DIAGNOSIS — R10.2 PELVIC PAIN IN FEMALE: ICD-10-CM

## 2021-06-10 DIAGNOSIS — M54.16 LUMBAR RADICULOPATHY: ICD-10-CM

## 2021-06-10 DIAGNOSIS — G62.9 PERIPHERAL POLYNEUROPATHY: Primary | ICD-10-CM

## 2021-06-10 PROCEDURE — 99417 PROLNG OP E/M EACH 15 MIN: CPT | Performed by: PSYCHIATRY & NEUROLOGY

## 2021-06-10 PROCEDURE — 99215 OFFICE O/P EST HI 40 MIN: CPT | Mod: GT | Performed by: PSYCHIATRY & NEUROLOGY

## 2021-06-10 RX ORDER — GABAPENTIN 600 MG/1
600 TABLET ORAL 3 TIMES DAILY
Qty: 120 TABLET | Refills: 8 | Status: SHIPPED | OUTPATIENT
Start: 2021-06-10 | End: 2021-10-14

## 2021-06-10 NOTE — LETTER
6/10/2021       RE: Shala Morales  [address] [facility] [address]     Dear Colleague,    Thank you for referring your patient, Shala Morales, to the [facility] NEUROLOGY CLINIC [place] at [facility]. Please see a copy of my visit note below.    [facility] Neurology Follow Up Visit    Shala Morales MRN# [id number]   Age: 46 year old YOB: 1975     Brief history of symptoms: The patient was initially seen in neurologic consultation on 4/9/2021 for evaluation of pelvic pain. Please see the comprehensive neurologic consultation note from that date in the Epic records for details. She described clinically a Right L4 radiculopathy, and had pain characteristic for L1 or L2 radicular process leading to ilioinguinal and genitofemoral nerve distribution related pain as well as perineal nerve distribution pain and femoral medial thigh pain. She was to have MRI lumbar spine, and titrate up on gabapentin along with duloxetine for pain relief.    Interval history: MRI lumbar spine on 5/1/2021 was revealing for only mild lumbar spondylosis at L3-4, L4-5 and L5-S1 due to mild and circumferential disc bulges.  There was mild-moderate neuroforaminal narrowing at multiple levels as well (L4-5 b/l, L5-S1 b/l).  B6/B12/Vitamin D were all normal on 4/9/2021.    Today, the patient has had improvement in her deep vaginal pain once starting on Cymbalta (pain of 5/10 instead of 10/10).  She also started acupuncture, which has helped a lot (focus pain down to specific regions).   Pelvic floor physical therapy has been helpful to some degree, specifically when focusing on the obturator internus (tightness).  When the obturator is palpated, she gets some bladder/vaginal discomfort pain followed by intense pain hours after.  The patient does piriformis stretching daily, and no improvement in her symptoms is noted with this stretching.     Medications:      Current Outpatient Medications   Medication Sig     Cyanocobalamin (VITAMIN B-12) 5000 MCG LOZG Take 5,000 mcg by mouth once a week     diazepam 10 mg SUPP Place vaginally or rectally daily as needed.     diazepam 5 mg SUPP Place 1 suppository (5 mg) rectally nightly as needed for seizures     DULoxetine (CYMBALTA) 60 MG capsule Take 1 capsule (60 mg) by mouth At Bedtime     estradiol (ESTRACE) 0.1 MG/GM vaginal cream APPLY SMALL AMOUNT TO AFFECTED AREA DAILY FOR 2 WEEKS     gabapentin (NEURONTIN) 100 MG capsule Take 5 capsules (500 mg) by mouth 3 times daily     ivermectin (SOOLANTRA) 1 % cream      lidocaine (XYLOCAINE) 2 % external gel APPLY A SMALL AMOUNT TO THE AFFECTED AREA TOPICALLY TWICE DAILY     olopatadine (PATANOL) 0.1 % ophthalmic solution INT 1 GTT IN OU BID     prednisoLONE acetate (PRED FORTE) 1 % ophthalmic suspension INSTILL 1 DROP BY OPHTHALMIC ROUTE 2 TIMES DAILY FOR 2 WEEKS, ONCE DAILY FOR 2 WEEKS INTO BOTH EYES     vitamin D3 (CHOLECALCIFEROL) 2000 units (50 mcg) tablet Take 1 tablet by mouth daily          Assessment and Plan:   Assessment:  Pelvic pain (vaginal, groin, inner thigh) with b/l dysesthesias but no sensory loss   Hx of likely R-sciatic pain, possibly related to L5 radiculopathy    The patient's imaging wasn't necessarily revealing for a large insult to the lumbar spine or roots upon exiting other than some mild-moderate neuro-foraminal narrowing.  What is interesting is that the patient's PT and the patient report being able to recreate her burning symptoms with palpation of the obturator internus.  I do wonder if there is an entrapment neuropathy  present affecting nerves between the obturator internus and the sacrotuberous ligament (pudendal, nerve to obturator) versus a radicular process from L5-S2.  Given the patients relatively subacute onset of symptoms, dry eyes and mouth, and repeat episodes of what is seen as rosacea, I do think looking into other causes of sensory  ganglionopathies is reasonable (serum studies below). The bilateral nature of her issues as well as urinary symptoms are still concerning enough for expanding her w/up to include spinal MRI if EMG and expanded serum studies are unrevealing (looking for myelopathic process/insult)     Plan:  EMG b/l lower extremity  Serum studies for sensory ganglionopathies, vasculitis neuropathy: ESR, CRP, Rh, Paraneoplastic panel, NANCY, ANCA, SSA, SSB, endomysial jocelyn, SPEP, Immunofixation, Lyme    If testing returns negative, then consideration towards imaging (MRI thoracic spine, cervical, brain) may be made versus small fiber nerve testing    Follow up in Neurology clinic in 8 weeks or earlier as needed should new concerns arise.    DONATO Zhou D.O.   of Neurology    Total time today (100 min) in this patient encounter was spent on pre-charting counseling and/or coordination of care.    Shala is a 46 year old who is being evaluated via a billable video visit.      How would you like to obtain your AVS? MyChart  If the video visit is dropped, the invitation should be resent by: Send to e-mail at: [email or web address]  Will anyone else be joining your video visit? No      Video Start Time: 1000  Video-Visit Details    Type of service:  Video Visit    Video End Time:10:30 AM    Originating Location (pt. Location): Home    Distant Location (provider location):  [facility] NEUROLOGY CLINIC [place]     Platform used for Video Visit: [facility]      Chief Complaint   Patient presents with     RECHECK     VIDEO VISIT RETURN      López Earl        Again, thank you for allowing me to participate in the care of your patient.      Sincerely,    Stefan Zhou, DO

## 2021-06-10 NOTE — PROGRESS NOTES
[facility] Neurology Follow Up Visit    Shala Morales MRN# [id number]   Age: 46 year old YOB: 1975     Brief history of symptoms: The patient was initially seen in neurologic consultation on 4/9/2021 for evaluation of pelvic pain. Please see the comprehensive neurologic consultation note from that date in the Epic records for details. She described clinically a Right L4 radiculopathy, and had pain characteristic for L1 or L2 radicular process leading to ilioinguinal and genitofemoral nerve distribution related pain as well as perineal nerve distribution pain and femoral medial thigh pain. She was to have MRI lumbar spine, and titrate up on gabapentin along with duloxetine for pain relief.    Interval history: MRI lumbar spine on 5/1/2021 was revealing for only mild lumbar spondylosis at L3-4, L4-5 and L5-S1 due to mild and circumferential disc bulges.  There was mild-moderate neuroforaminal narrowing at multiple levels as well (L4-5 b/l, L5-S1 b/l).  B6/B12/Vitamin D were all normal on 4/9/2021.    Today, the patient has had improvement in her deep vaginal pain once starting on Cymbalta (pain of 5/10 instead of 10/10).  She also started acupuncture, which has helped a lot (focus pain down to specific regions).   Pelvic floor physical therapy has been helpful to some degree, specifically when focusing on the obturator internus (tightness).  When the obturator is palpated, she gets some bladder/vaginal discomfort pain followed by intense pain hours after.  The patient does piriformis stretching daily, and no improvement in her symptoms is noted with this stretching.     Medications:     Current Outpatient Medications   Medication Sig     Cyanocobalamin (VITAMIN B-12) 5000 MCG LOZG Take 5,000 mcg by mouth once a week     diazepam 10 mg SUPP Place vaginally or rectally daily as needed.     diazepam 5 mg SUPP Place 1 suppository (5 mg) rectally nightly as needed for seizures     DULoxetine (CYMBALTA) 60 MG  capsule Take 1 capsule (60 mg) by mouth At Bedtime     estradiol (ESTRACE) 0.1 MG/GM vaginal cream APPLY SMALL AMOUNT TO AFFECTED AREA DAILY FOR 2 WEEKS     gabapentin (NEURONTIN) 100 MG capsule Take 5 capsules (500 mg) by mouth 3 times daily     ivermectin (SOOLANTRA) 1 % cream      lidocaine (XYLOCAINE) 2 % external gel APPLY A SMALL AMOUNT TO THE AFFECTED AREA TOPICALLY TWICE DAILY     olopatadine (PATANOL) 0.1 % ophthalmic solution INT 1 GTT IN OU BID     prednisoLONE acetate (PRED FORTE) 1 % ophthalmic suspension INSTILL 1 DROP BY OPHTHALMIC ROUTE 2 TIMES DAILY FOR 2 WEEKS, ONCE DAILY FOR 2 WEEKS INTO BOTH EYES     vitamin D3 (CHOLECALCIFEROL) 2000 units (50 mcg) tablet Take 1 tablet by mouth daily          Assessment and Plan:   Assessment:  Pelvic pain (vaginal, groin, inner thigh) with b/l dysesthesias but no sensory loss   Hx of likely R-sciatic pain, possibly related to L5 radiculopathy    The patient's imaging wasn't necessarily revealing for a large insult to the lumbar spine or roots upon exiting other than some mild-moderate neuro-foraminal narrowing.  What is interesting is that the patient's PT and the patient report being able to recreate her burning symptoms with palpation of the obturator internus.  I do wonder if there is an entrapment neuropathy  present affecting nerves between the obturator internus and the sacrotuberous ligament (pudendal, nerve to obturator) versus a radicular process from L5-S2.  Given the patients relatively subacute onset of symptoms, dry eyes and mouth, and repeat episodes of what is seen as rosacea, I do think looking into other causes of sensory ganglionopathies is reasonable (serum studies below). The bilateral nature of her issues as well as urinary symptoms are still concerning enough for expanding her w/up to include spinal MRI if EMG and expanded serum studies are unrevealing (looking for myelopathic process/insult)     Plan:  EMG b/l lower extremity  Serum  studies for sensory ganglionopathies, vasculitis neuropathy: ESR, CRP, Rh, Paraneoplastic panel, NANCY, ANCA, SSA, SSB, endomysial jocelyn, SPEP, Immunofixation, Lyme    If testing returns negative, then consideration towards imaging (MRI thoracic spine, cervical, brain) may be made versus small fiber nerve testing    Follow up in Neurology clinic in 8 weeks or earlier as needed should new concerns arise.    DONATO Zhou D.O.   of Neurology    Total time today (100 min) in this patient encounter was spent on pre-charting counseling and/or coordination of care.

## 2021-06-10 NOTE — PROGRESS NOTES
Shala is a 46 year old who is being evaluated via a billable video visit.      How would you like to obtain your AVS? MyChart  If the video visit is dropped, the invitation should be resent by: Send to e-mail at: [email or web address]  Will anyone else be joining your video visit? No      Video Start Time: 1000  Video-Visit Details    Type of service:  Video Visit    Video End Time:10:30 AM    Originating Location (pt. Location): Home    Distant Location (provider location):  [facility] [place]     Platform used for Video Visit: [facility]      Chief Complaint   Patient presents with     RECHECK     VIDEO VISIT RETURN      López Earl

## 2021-06-10 NOTE — PATIENT INSTRUCTIONS
Serums Studies:  - Lyme, CRP, ESR, SPEP, Immunofixation, SSB, SSA, Rh, NANCY, Endomysial, ANCA    EMG to be done to look for radicular verus compression sciatica versus sensory ganlgionopathy

## 2021-06-10 NOTE — PROGRESS NOTES
"Subjective:  HPI  Physical Exam                    Objective:  System    Physical Exam    General     ROS    Assessment/Plan:    DISCHARGE REPORT    Progress reporting period is from 4-6-21 to 6-9-21, 7 visits    SUBJECTIVE  Subjective changes noted by patient:  .  Subjective: More good than bad days. Is doing accupuncture 2x/wk and this has helped the most.  Not where she wants it to be, does not want to have to live with the pain Will get about 2-3 good days after accupuncture and then pain may increase to 5/10 at worst and has to cut back activity or use vaginal valium. Wakes at night 2-3x due to pain. It is no longer the intense burning but is cramping, gnawing, tingling, \"flipping\" sensation. The pain is deep L intravaginal. She also has pressure over bladder/pubis but this is less intense since initial. BM's improved 3x/wk type 4-5 still has to strain at times. Urine stream is strong and feels empty with void times at about 2 hrs.  Able to sit in a softer chair with 1-2/10 PL, unable to sit on a harder kitchen type chair at all.      Current Pain level: 2/10.      Initial Pain level: 4/10.   Changes in function:  Slow changes  Adverse reaction to treatment or activity: None    OBJECTIVE  Changes noted in objective findings:    Objective: Intravaginal palpation-pain L OI deepest portion reproduces pain, none on R side. L hip continues to show reduced ROM especially IR>ER, and mild hip flx loss. L psoas painful to palpation. Pelvic asymmetry-appeared upslip on L with palpation and also R ant ilial rotation.      ASSESSMENT/PLAN  Updated problem list and treatment plan: Diagnosis 1:  Pelvic pain, urinary urgency/frequency, constipation-continue acupuncture and HEP    STG/LTGs have been met or progress has been made towards goals:  Has progressed but very slow with set backs  Assessment of Progress: slow progress  Self Management Plans:  Patient has been instructed in a home treatment program.  Patient  has been " instructed in self management of symptoms.  I have re-evaluated this patient and find that the nature, scope, duration and intensity of the therapy is appropriate for the medical condition of the patient.  Shala continues to require the following intervention to meet STG and LTG's:  In talking with Shala, she is getting more relief with acupuncture so will continue with it at this time    Recommendations:  Discharge from PT, Shala will continue with acupuncture as it is giving her most relief at this time.     Please refer to the daily flowsheet for treatment today, total treatment time and time spent performing 1:1 timed codes.

## 2021-06-15 DIAGNOSIS — G62.9 PERIPHERAL POLYNEUROPATHY: ICD-10-CM

## 2021-06-15 LAB
B BURGDOR IGG+IGM SER QL: 0.04 (ref 0–0.89)
CRP SERPL-MCNC: <2.9 MG/L (ref 0–8)
ERYTHROCYTE [SEDIMENTATION RATE] IN BLOOD BY WESTERGREN METHOD: 9 MM/H (ref 0–20)

## 2021-06-15 PROCEDURE — 86255 FLUORESCENT ANTIBODY SCREEN: CPT | Mod: 90 | Performed by: PSYCHIATRY & NEUROLOGY

## 2021-06-15 PROCEDURE — 84165 PROTEIN E-PHORESIS SERUM: CPT | Performed by: PATHOLOGY

## 2021-06-15 PROCEDURE — 86618 LYME DISEASE ANTIBODY: CPT | Performed by: PSYCHIATRY & NEUROLOGY

## 2021-06-15 PROCEDURE — 85652 RBC SED RATE AUTOMATED: CPT | Performed by: PSYCHIATRY & NEUROLOGY

## 2021-06-15 PROCEDURE — 86140 C-REACTIVE PROTEIN: CPT | Performed by: PSYCHIATRY & NEUROLOGY

## 2021-06-15 PROCEDURE — 86235 NUCLEAR ANTIGEN ANTIBODY: CPT | Performed by: PSYCHIATRY & NEUROLOGY

## 2021-06-15 PROCEDURE — 86431 RHEUMATOID FACTOR QUANT: CPT | Performed by: PSYCHIATRY & NEUROLOGY

## 2021-06-15 PROCEDURE — 99N1036 PR STATISTIC TOTAL PROTEIN: Performed by: PSYCHIATRY & NEUROLOGY

## 2021-06-15 PROCEDURE — 83519 RIA NONANTIBODY: CPT | Mod: 90 | Performed by: PSYCHIATRY & NEUROLOGY

## 2021-06-15 PROCEDURE — 86255 FLUORESCENT ANTIBODY SCREEN: CPT | Mod: 59 | Performed by: PSYCHIATRY & NEUROLOGY

## 2021-06-15 PROCEDURE — 86256 FLUORESCENT ANTIBODY TITER: CPT | Mod: 90 | Performed by: PSYCHIATRY & NEUROLOGY

## 2021-06-15 PROCEDURE — 36415 COLL VENOUS BLD VENIPUNCTURE: CPT | Performed by: PSYCHIATRY & NEUROLOGY

## 2021-06-15 PROCEDURE — 86038 ANTINUCLEAR ANTIBODIES: CPT | Performed by: PSYCHIATRY & NEUROLOGY

## 2021-06-15 PROCEDURE — 86334 IMMUNOFIX E-PHORESIS SERUM: CPT | Performed by: PATHOLOGY

## 2021-06-15 PROCEDURE — 99000 SPECIMEN HANDLING OFFICE-LAB: CPT | Performed by: PSYCHIATRY & NEUROLOGY

## 2021-06-15 PROCEDURE — 83520 IMMUNOASSAY QUANT NOS NONAB: CPT | Mod: 90 | Performed by: PSYCHIATRY & NEUROLOGY

## 2021-06-15 PROCEDURE — 82784 ASSAY IGA/IGD/IGG/IGM EACH: CPT | Performed by: PSYCHIATRY & NEUROLOGY

## 2021-06-16 LAB
ALBUMIN SERPL ELPH-MCNC: 3.9 G/DL (ref 3.7–5.1)
ALPHA1 GLOB SERPL ELPH-MCNC: 0.3 G/DL (ref 0.2–0.4)
ALPHA2 GLOB SERPL ELPH-MCNC: 0.6 G/DL (ref 0.5–0.9)
ANA SER QL IF: NEGATIVE
ANCA AB PATTERN SER IF-IMP: NORMAL
B-GLOBULIN SERPL ELPH-MCNC: 0.7 G/DL (ref 0.6–1)
C-ANCA TITR SER IF: NORMAL {TITER}
ENA SS-A IGG SER IA-ACNC: <0.2 AI (ref 0–0.9)
ENA SS-B IGG SER IA-ACNC: <0.2 AI (ref 0–0.9)
GAMMA GLOB SERPL ELPH-MCNC: 1.2 G/DL (ref 0.7–1.6)
IGA SERPL-MCNC: 154 MG/DL (ref 84–499)
IGG SERPL-MCNC: 1203 MG/DL (ref 610–1616)
IGM SERPL-MCNC: 168 MG/DL (ref 35–242)
M PROTEIN SERPL ELPH-MCNC: 0 G/DL
PROT PATTERN SERPL ELPH-IMP: NORMAL
PROT PATTERN SERPL IFE-IMP: NORMAL
RHEUMATOID FACT SER NEPH-ACNC: <7 IU/ML (ref 0–20)

## 2021-06-17 LAB — ENDOMYSIUM IGA TITR SER IF: NORMAL {TITER}

## 2021-06-21 LAB
CASPR2 IGG CELL BINDING: NEGATIVE
LGI1 IGG CELL BINDING: NEGATIVE

## 2021-06-22 LAB — PNP ABY SERUM: NORMAL

## 2021-07-02 DIAGNOSIS — M54.16 LUMBAR RADICULOPATHY: ICD-10-CM

## 2021-07-02 NOTE — TELEPHONE ENCOUNTER
Rx Authorization:    Requested Medication/ Dose: Duloxetine HCL DR 20 MG CAP    Date last refill ordered: 5/19/21    Quantity ordered: 90 caps    # refills: 3    Date of last clinic visit with ordering provider: 6/10/21    Date of next clinic visit with ordering provider: F/U 2 months    All pertinent protocol data (lab date/result):     Include pertinent information from patients message:

## 2021-07-05 RX ORDER — DULOXETIN HYDROCHLORIDE 60 MG/1
60 CAPSULE, DELAYED RELEASE ORAL AT BEDTIME
Qty: 90 CAPSULE | Refills: 3 | Status: SHIPPED | OUTPATIENT
Start: 2021-07-05 | End: 2021-08-13

## 2021-07-13 ENCOUNTER — PRE VISIT (OUTPATIENT)
Dept: UROLOGY | Facility: CLINIC | Age: 46
End: 2021-07-13

## 2021-07-13 ENCOUNTER — OFFICE VISIT (OUTPATIENT)
Dept: NEUROLOGY | Facility: CLINIC | Age: 46
End: 2021-07-13
Attending: PSYCHIATRY & NEUROLOGY
Payer: COMMERCIAL

## 2021-07-13 DIAGNOSIS — G62.9 PERIPHERAL POLYNEUROPATHY: ICD-10-CM

## 2021-07-13 DIAGNOSIS — R10.2 PELVIC PAIN IN FEMALE: Primary | ICD-10-CM

## 2021-07-13 PROCEDURE — 95910 NRV CNDJ TEST 7-8 STUDIES: CPT | Performed by: PSYCHIATRY & NEUROLOGY

## 2021-07-13 PROCEDURE — 95886 MUSC TEST DONE W/N TEST COMP: CPT | Performed by: PSYCHIATRY & NEUROLOGY

## 2021-07-13 PROCEDURE — 95885 MUSC TST DONE W/NERV TST LIM: CPT | Mod: 59 | Performed by: PSYCHIATRY & NEUROLOGY

## 2021-07-13 NOTE — PROGRESS NOTES
[facility]  Electrodiagnostic Laboratory    Nerve Conduction & EMG Report          Patient:       Shala Morales  Patient ID:    [id number]  Gender:        Female  YOB: 1975  Age:           46 Years 4 Months      Referring Physician: ROLANDA Zhou DO    History & Examination:    46 year old woman with chief complaint of deep or burning pelvic pain bilaterally. She previously had intermittent episodes of paresthesias radiating from the right buttock to the lateral thigh and lateral calf, and/or from the left groin to the left medial knee; those have not recurred lately. She denies any upper extremity symptoms. EMG requested to evaluate for bilateral lumbosacral radiculopathies (right L5, left L4) and to rule out sensory ganglionopathy.    Techniques: Motor and sensory conduction studies were done with surface recording electrodes.EMG was done with a concentric needle electrode.     Results:    Right median, radial, bilateral sural, and right superficial peroneal antidromic sensory NCSs were normal. Right deep peroneal and bilateral tibial motor NCSs were normal. Right tibial F-waves were normal. Needle EMG of selected muscles at bilateral lower extremities, right L5 and left L4 paraspinals was normal. Increased insertional activity noted at the right medial gastrocnemius muscle without additional abnormalities, is a non-specific finding.     Interpretation:    Normal study. No electrodiagnostic evidence of lumbosacral radiculopathy on either side, or sensory ganglionopathy.     EMG Physician:    Andre Sanchez MD       Sensory NCS      Nerve / Sites Rec. Site Onset Peak NP Amp Ref. PP Amp Dist Dawson Ref. Temp     ms ms  V  V  V cm m/s m/s  C   R MEDIAN - Dig II Anti      Wrist Dig II 2.86 3.70 31.2 10.0 49.7 14 48.9 48.0 26.6   R RADIAL - Snuff      Forearm Snuff 1.41 1.93 50.1 15.0 71.2 10 71.1 48.0 26.4   R SURAL - Lat Mall      Calf Ankle 2.50 3.39 11.1 8.0 9.1 14 56.0 38.0  27.3   L SURAL - Lat Mall      Calf Ankle 2.50 3.39 11.5 8.0 6.7 14 56.0 38.0 25.9   R SUP PERONEAL      Lat Leg Sparks 2.55 3.44 8.8  9.9 12.5 49.0 38.0 27       Motor NCS      Nerve / Sites Rec. Site Lat Ref. Amp Ref. Rel Amp Dist Dawson Ref. Dur. Area Temp.     ms ms mV mV % cm m/s m/s ms %  C   R DEEP PERONEAL - EDB      Ankle EDB 4.01 6.00 7.3 2.5 100 8   6.93 100 27.5      FibHead EDB 11.25  6.7  91.7 35 48.3 38.0 7.34 92.3 27.5      Pop Fos EDB 13.18  6.6  90 9 46.7 38.0 7.08 95.3 27.5   R TIBIAL - AH      Ankle AH 3.54 6.00 13.5 4.0 100 8   6.98 100 27.4      Pop Fos AH 12.71  8.4  62.4 42 45.8 38.0 8.23 77.5 27.3   L TIBIAL - AH      Ankle AH 3.65 6.00 11.6 4.0 100 8   6.46 100 27.3       F  Wave      Nerve Min F Lat Max F Lat Mean FLat Temp.    ms ms ms  C   R TIBIAL 51.88 52.66 52.09 27.8       EMG Summary Table     Spontaneous Recruitment MUAP    IA Fib PSW Fasc H.F. Pattern Amp Dur. PPP   R. TIB ANTERIOR N None None None None N N N N   R. GASTROCN (MED) 1+ None None None None N N N N   R. VAST LATERALIS N None None None None N N N N   R. BIC FEM (S HEAD) N None None None None N N N N   R. GLUTEUS MED N None None None None N N N N   R. L5 PSP N None None None None N N N N   L. L4 PSP N None None None None N N N N   L. TIB ANTERIOR N None None None None N N N N   L. GASTROCN (MED) N None None None None N N N N   L. VAST MEDIALIS N None None None None N N N N   L. ADD LONGUS N None None None None N N N N

## 2021-07-13 NOTE — LETTER
7/13/2021       RE: Shala Morales  [address] [address]     Dear Colleague,    Thank you for referring your patient, Shala Morales, to the [facility] EMG CLIN[facility] [place] at [facility]. Please see a copy of my visit note below.        [facility]  Electrodiagnostic Laboratory    Nerve Conduction & EMG Report          Patient:       Shala Morales  Patient ID:    [id number]  Gender:        Female  YOB: 1975  Age:           46 Years 4 Months      Referring Physician: ROLANDA Zhou DO    History & Examination:    46 year old woman with chief complaint of deep or burning pelvic pain bilaterally. She previously had intermittent episodes of paresthesias radiating from the right buttock to the lateral thigh and lateral calf, and/or from the left groin to the left medial knee; those have not recurred lately. She denies any upper extremity symptoms. EMG requested to evaluate for bilateral lumbosacral radiculopathies (right L5, left L4) and to rule out sensory ganglionopathy.    Techniques: Motor and sensory conduction studies were done with surface recording electrodes.EMG was done with a concentric needle electrode.     Results:    Right median, radial, bilateral sural, and right superficial peroneal antidromic sensory NCSs were normal. Right deep peroneal and bilateral tibial motor NCSs were normal. Right tibial F-waves were normal. Needle EMG of selected muscles at bilateral lower extremities, right L5 and left L4 paraspinals was normal. Increased insertional activity noted at the right medial gastrocnemius muscle without additional abnormalities, is a non-specific finding.     Interpretation:    Normal study. No electrodiagnostic evidence of lumbosacral radiculopathy on either side, or sensory ganglionopathy.     EMG Physician:    Andre Sanchez MD       Sensory NCS      Nerve / Sites Rec. Site Onset  Peak NP Amp Ref. PP Amp Dist Dawson Ref. Temp     ms ms  V  V  V cm m/s m/s  C   R MEDIAN - Dig II Anti      Wrist Dig II 2.86 3.70 31.2 10.0 49.7 14 48.9 48.0 26.6   R RADIAL - Snuff      Forearm Snuff 1.41 1.93 50.1 15.0 71.2 10 71.1 48.0 26.4   R SURAL - Lat Mall      Calf Ankle 2.50 3.39 11.1 8.0 9.1 14 56.0 38.0 27.3   L SURAL - Lat Mall      Calf Ankle 2.50 3.39 11.5 8.0 6.7 14 56.0 38.0 25.9   R SUP PERONEAL      Lat Leg Sparks 2.55 3.44 8.8  9.9 12.5 49.0 38.0 27       Motor NCS      Nerve / Sites Rec. Site Lat Ref. Amp Ref. Rel Amp Dist Dawson Ref. Dur. Area Temp.     ms ms mV mV % cm m/s m/s ms %  C   R DEEP PERONEAL - EDB      Ankle EDB 4.01 6.00 7.3 2.5 100 8   6.93 100 27.5      FibHead EDB 11.25  6.7  91.7 35 48.3 38.0 7.34 92.3 27.5      Pop Fos EDB 13.18  6.6  90 9 46.7 38.0 7.08 95.3 27.5   R TIBIAL - AH      Ankle AH 3.54 6.00 13.5 4.0 100 8   6.98 100 27.4      Pop Fos AH 12.71  8.4  62.4 42 45.8 38.0 8.23 77.5 27.3   L TIBIAL - AH      Ankle AH 3.65 6.00 11.6 4.0 100 8   6.46 100 27.3       F  Wave      Nerve Min F Lat Max F Lat Mean FLat Temp.    ms ms ms  C   R TIBIAL 51.88 52.66 52.09 27.8       EMG Summary Table     Spontaneous Recruitment MUAP    IA Fib PSW Fasc H.F. Pattern Amp Dur. PPP   R. TIB ANTERIOR N None None None None N N N N   R. GASTROCN (MED) 1+ None None None None N N N N   R. VAST LATERALIS N None None None None N N N N   R. BIC FEM (S HEAD) N None None None None N N N N   R. GLUTEUS MED N None None None None N N N N   R. L5 PSP N None None None None N N N N   L. L4 PSP N None None None None N N N N   L. TIB ANTERIOR N None None None None N N N N   L. GASTROCN (MED) N None None None None N N N N   L. VAST MEDIALIS N None None None None N N N N   L. ADD LONGUS N None None None None N N N N                                  Again, thank you for allowing me to participate in the care of your patient.      Sincerely,    Andre Sanchez MD

## 2021-07-13 NOTE — TELEPHONE ENCOUNTER
Reason for Visit: 3 month Follow-up    Diagnosis: Pelvic pain in female, Urinary urgency, Frequency of urination    Orders/Procedures/Records: in system    Contact Patient: n/a    Rooming Requirements: Video Visit      Ovi Rogers  07/13/21  2:03 PM

## 2021-07-15 DIAGNOSIS — Z12.31 VISIT FOR SCREENING MAMMOGRAM: ICD-10-CM

## 2021-07-15 PROCEDURE — 77067 SCR MAMMO BI INCL CAD: CPT | Mod: TC | Performed by: RADIOLOGY

## 2021-07-15 PROCEDURE — 77063 BREAST TOMOSYNTHESIS BI: CPT | Mod: TC | Performed by: RADIOLOGY

## 2021-07-16 NOTE — RESULT ENCOUNTER NOTE
Dear Shala,    Your recent test results are attached.      Normal mammogram.      If you have any questions please feel free to contact [phone] or myself via [employer].    Sincerely,  Rita Mohr, CNP

## 2021-07-21 ENCOUNTER — VIRTUAL VISIT (OUTPATIENT)
Dept: UROLOGY | Facility: CLINIC | Age: 46
End: 2021-07-21
Payer: COMMERCIAL

## 2021-07-21 DIAGNOSIS — R10.2 PELVIC PAIN: ICD-10-CM

## 2021-07-21 DIAGNOSIS — M62.89 PELVIC FLOOR DYSFUNCTION: ICD-10-CM

## 2021-07-21 DIAGNOSIS — R35.0 FREQUENCY OF URINATION: ICD-10-CM

## 2021-07-21 DIAGNOSIS — N94.819 VULVODYNIA: ICD-10-CM

## 2021-07-21 DIAGNOSIS — R39.15 URINARY URGENCY: Primary | ICD-10-CM

## 2021-07-21 PROCEDURE — 99214 OFFICE O/P EST MOD 30 MIN: CPT | Mod: GT | Performed by: UROLOGY

## 2021-07-21 NOTE — LETTER
7/21/2021       RE: Shala Morales  [address] [facility] [address]     Dear Colleague,    Thank you for referring your patient, Shala Morales, to the [facility] UROLOGY CLIN[facility] [place] at [facility]. Please see a copy of my visit note below.    Shala is a 46 year old who is being evaluated via a billable video visit.      How would you like to obtain your AVS? MyChart  If the video visit is dropped, the invitation should be resent by: Text to cell phone: [phone]  Will anyone else be joining your video visit? No      Video Start Time: 9:42 AM  Video-Visit Details    Type of service:  Video Visit    Video End Time:9:58 AM    Originating Location (pt. Location): Home    Distant Location (provider location):  [facility] UROLOGY [facility]     Platform used for Video Visit: [employer]    Reason for Visit:  F/u on urinary symptoms    Clinical Data: Ms. Shala Morales is a 46 year old female with a hx of lower urinary tract symptoms. Her symptoms began after intercourse December 2020 in which she initially had burning pain in the vaginal area. She was treated with 2 week course of Metrogel for bacterial vaginosis. While on Metrogel, her symptoms escalated to include burning sensation in the vaginal area and burning with voids. She also experienced increased urinary frequency and urgency, voiding over 20 times per day and pain, rating pain as high as 9/10 in intensity. 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 At that time, she received bladder instillation with heparin, lidocaine, and sodium bicarb. Her pain improved while the medication was in the bladder however following void, pain returned. Due to thoughts of her symptoms being caused by post herpetic neuralgia, she was started onn gabapentin. As she titrated up, she developed blurry vision. She remains on 300 mg of  gabapentin in the morning, 400 mg in the afternoon and 40 mg in the evening. Additionally, she was started on nortriptyline which she has increased to 20 mg per day but then stopped that and started Cymbalta and increased her Gabapentin to 1800 mg daily and her symptoms are much improved.    She was treated empirically with Valtrex for HSV. She was provided prescription for vaginal Valium which she continues to use as needed. She had lumbar spine MRI which was normal. Additionally, she had evaluation with Gynecology with negative Pap smear. She was found to have atrophic vaginal tissue and is on a compound it estrogen replacement cream. She had been using daily with some improvement in symptoms and decreased to twice weekly. With decreasing frequency, her symptoms worsened and she is now back to utilizing estrogen cream daily. She notes that her symptoms wax and wane. She reports her pain increases throughout the night. She sleeps up right as this helps decrease the pain in her perineal/bladder/pelvic area. Currently, she is voiding approximately 10 times throughout the day as she has had improvement with the use of gabapentin and nortriptyline. She denies dysuria at present. No gross hematuria or recent urinary tract infections. She reports strong urinary flow however is slow and prolonged at end of void. She does report constipation. She has irregular menses, and is perimenopausal. She has not had sexual activity since December of 2020 given onset of pain.    She completed 2 day bladder diary which demonstrates voided volumes ranging from 4 to 12 oz. Fluid intake consisted of only water. She notes that she has dramatically changed her diet to include only bland items.     Cystoscopy in April of 21 was normal.    She tried PT and didn't feel like it helped much but started acupuncture and that has been helpful.  Her frequency is much more improved.  She has some urgency but better than before.    A/P:  46 year old  female with urinary urgency and frequency as well as pelvic pain and vulvodynia.  She has start acupuncture and finds this is helpful. She has some hip unevenness and is wondering about a physiotherapist or someone to address.  Her pain is on the left as is her hip problem.   -continue with  Acupuncture  -continue cymbalta  -continue gabapentin  -discuss with primary regarding hip issues.  -consider evaluation with pain clinic  -f/u    Thank you for allowing me to participate in the care of  Ms. Shala Morales and I will keep you updated on her progress.    Carrol Levin MD    30 minutes spent on the date of the encounter doing chart review, history and exam, documentation and further activities per the note

## 2021-07-21 NOTE — PROGRESS NOTES
Reason for Visit:  F/u on urinary symptoms    Clinical Data: Ms. Shala Morales is a 46 year old female with a hx of lower urinary tract symptoms. Her symptoms began after intercourse December 2020 in which she initially had burning pain in the vaginal area. She was treated with 2 week course of Metrogel for bacterial vaginosis. While on Metrogel, her symptoms escalated to include burning sensation in the vaginal area and burning with voids. She also experienced increased urinary frequency and urgency, voiding over 20 times per day and pain, rating pain as high as 9/10 in intensity. 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 At that time, she received bladder instillation with heparin, lidocaine, and sodium bicarb. Her pain improved while the medication was in the bladder however following void, pain returned. Due to thoughts of her symptoms being caused by post herpetic neuralgia, she was started onn gabapentin. As she titrated up, she developed blurry vision. She remains on 300 mg of gabapentin in the morning, 400 mg in the afternoon and 40 mg in the evening. Additionally, she was started on nortriptyline which she has increased to 20 mg per day but then stopped that and started Cymbalta and increased her Gabapentin to 1800 mg daily and her symptoms are much improved.    She was treated empirically with Valtrex for HSV. She was provided prescription for vaginal Valium which she continues to use as needed. She had lumbar spine MRI which was normal. Additionally, she had evaluation with Gynecology with negative Pap smear. She was found to have atrophic vaginal tissue and is on a compound it estrogen replacement cream. She had been using daily with some improvement in symptoms and decreased to twice weekly. With decreasing frequency, her symptoms worsened and she is now back to utilizing estrogen cream daily. She notes that her symptoms wax and wane. She reports  her pain increases throughout the night. She sleeps up right as this helps decrease the pain in her perineal/bladder/pelvic area. Currently, she is voiding approximately 10 times throughout the day as she has had improvement with the use of gabapentin and nortriptyline. She denies dysuria at present. No gross hematuria or recent urinary tract infections. She reports strong urinary flow however is slow and prolonged at end of void. She does report constipation. She has irregular menses, and is perimenopausal. She has not had sexual activity since December of 2020 given onset of pain.    She completed 2 day bladder diary which demonstrates voided volumes ranging from 4 to 12 oz. Fluid intake consisted of only water. She notes that she has dramatically changed her diet to include only bland items.     Cystoscopy in April of 21 was normal.    She tried PT and didn't feel like it helped much but started acupuncture and that has been helpful.  Her frequency is much more improved.  She has some urgency but better than before.    A/P:  46 year old female with urinary urgency and frequency as well as pelvic pain and vulvodynia.  She has start acupuncture and finds this is helpful. She has some hip unevenness and is wondering about a physiotherapist or someone to address.  Her pain is on the left as is her hip problem.   -continue with  Acupuncture  -continue cymbalta  -continue gabapentin  -discuss with primary regarding hip issues.  -consider evaluation with pain clinic  -f/u    Thank you for allowing me to participate in the care of  Ms. Shala Morales and I will keep you updated on her progress.    Carrol Levin MD    30 minutes spent on the date of the encounter doing chart review, history and exam, documentation and further activities per the note

## 2021-07-21 NOTE — PATIENT INSTRUCTIONS
-continue with  Acupuncture  -continue cymbalta  -continue gabapentin  -discuss with primary regarding hip issues.  -consider evaluation with pain clinic  -f/u

## 2021-07-21 NOTE — PROGRESS NOTES
Shala is a 46 year old who is being evaluated via a billable video visit.      How would you like to obtain your AVS? MyChart  If the video visit is dropped, the invitation should be resent by: Text to cell phone: [phone]  Will anyone else be joining your video visit? No      Video Start Time: 9:42 AM  Video-Visit Details    Type of service:  Video Visit    Video End Time:9:58 AM    Originating Location (pt. Location): Home    Distant Location (provider location):  [facility]OLOG[facility]     Platform used for Video Visit: [employer]

## 2021-08-11 ENCOUNTER — ANCILLARY PROCEDURE (OUTPATIENT)
Dept: GENERAL RADIOLOGY | Facility: CLINIC | Age: 46
End: 2021-08-11
Attending: FAMILY MEDICINE
Payer: COMMERCIAL

## 2021-08-11 ENCOUNTER — OFFICE VISIT (OUTPATIENT)
Dept: ORTHOPEDICS | Facility: CLINIC | Age: 46
End: 2021-08-11
Payer: COMMERCIAL

## 2021-08-11 VITALS
SYSTOLIC BLOOD PRESSURE: 123 MMHG | BODY MASS INDEX: 25.18 KG/M2 | WEIGHT: 170 LBS | DIASTOLIC BLOOD PRESSURE: 78 MMHG | HEIGHT: 69 IN

## 2021-08-11 DIAGNOSIS — M25.851 FEMOROACETABULAR IMPINGEMENT OF BOTH HIPS: ICD-10-CM

## 2021-08-11 DIAGNOSIS — M25.852 FEMOROACETABULAR IMPINGEMENT OF BOTH HIPS: ICD-10-CM

## 2021-08-11 DIAGNOSIS — M25.552 HIP PAIN, CHRONIC, LEFT: Primary | ICD-10-CM

## 2021-08-11 DIAGNOSIS — G89.29 HIP PAIN, CHRONIC, LEFT: Primary | ICD-10-CM

## 2021-08-11 DIAGNOSIS — G89.29 HIP PAIN, CHRONIC, LEFT: ICD-10-CM

## 2021-08-11 DIAGNOSIS — M25.552 HIP PAIN, CHRONIC, LEFT: ICD-10-CM

## 2021-08-11 PROCEDURE — 73502 X-RAY EXAM HIP UNI 2-3 VIEWS: CPT | Mod: LT | Performed by: RADIOLOGY

## 2021-08-11 PROCEDURE — 99214 OFFICE O/P EST MOD 30 MIN: CPT | Performed by: FAMILY MEDICINE

## 2021-08-11 ASSESSMENT — MIFFLIN-ST. JEOR: SCORE: 1472.55
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
## 2021-08-13 DIAGNOSIS — M54.16 LUMBAR RADICULOPATHY: ICD-10-CM

## 2021-08-13 RX ORDER — DULOXETIN HYDROCHLORIDE 60 MG/1
60 CAPSULE, DELAYED RELEASE ORAL AT BEDTIME
Qty: 90 CAPSULE | Refills: 3 | Status: SHIPPED | OUTPATIENT
Start: 2021-08-13 | End: 2021-10-14

## 2021-08-18 ENCOUNTER — MYC MEDICAL ADVICE (OUTPATIENT)
Dept: ORTHOPEDICS | Facility: CLINIC | Age: 46
End: 2021-08-18

## 2021-08-18 DIAGNOSIS — G89.29 HIP PAIN, CHRONIC, LEFT: Primary | ICD-10-CM

## 2021-08-18 DIAGNOSIS — M25.851 FEMOROACETABULAR IMPINGEMENT OF BOTH HIPS: ICD-10-CM

## 2021-08-18 DIAGNOSIS — M25.852 FEMOROACETABULAR IMPINGEMENT OF BOTH HIPS: ICD-10-CM

## 2021-08-18 DIAGNOSIS — M25.552 HIP PAIN, CHRONIC, LEFT: Primary | ICD-10-CM

## 2021-09-12 ENCOUNTER — HEALTH MAINTENANCE LETTER (OUTPATIENT)
Age: 46
End: 2021-09-12

## 2021-09-13 ENCOUNTER — THERAPY VISIT (OUTPATIENT)
Dept: PHYSICAL THERAPY | Facility: CLINIC | Age: 46
End: 2021-09-13
Attending: FAMILY MEDICINE
Payer: COMMERCIAL

## 2021-09-13 DIAGNOSIS — M25.552 HIP PAIN, CHRONIC, LEFT: ICD-10-CM

## 2021-09-13 DIAGNOSIS — G89.29 HIP PAIN, CHRONIC, LEFT: ICD-10-CM

## 2021-09-13 DIAGNOSIS — M25.552 HIP PAIN, LEFT: ICD-10-CM

## 2021-09-13 PROCEDURE — 97140 MANUAL THERAPY 1/> REGIONS: CPT | Mod: GP | Performed by: PHYSICAL THERAPIST

## 2021-09-13 PROCEDURE — 97161 PT EVAL LOW COMPLEX 20 MIN: CPT | Mod: GP | Performed by: PHYSICAL THERAPIST

## 2021-09-13 ASSESSMENT — ACTIVITIES OF DAILY LIVING (ADL)
LIGHT_TO_MODERATE_WORK: SLIGHT DIFFICULTY
WALKING_APPROXIMATELY_10_MINUTES: SLIGHT DIFFICULTY
WALKING_INITIALLY: MODERATE DIFFICULTY
HEAVY_WORK: MODERATE DIFFICULTY
RECREATIONAL_ACTIVITIES: MODERATE DIFFICULTY
HOS_ADL_ITEM_SCORE_TOTAL: 48
GETTING_INTO_AND_OUT_OF_A_BATHTUB: NO DIFFICULTY AT ALL
STANDING_FOR_15_MINUTES: SLIGHT DIFFICULTY
GETTING_INTO_AND_OUT_OF_AN_AVERAGE_CAR: SLIGHT DIFFICULTY
ROLLING_OVER_IN_BED: NO DIFFICULTY AT ALL
WALKING_15_MINUTES_OR_GREATER: MODERATE DIFFICULTY
TWISTING/PIVOTING_ON_INVOLVED_LEG: NO DIFFICULTY AT ALL
HOS_ADL_COUNT: 17
GOING_UP_1_FLIGHT_OF_STAIRS: SLIGHT DIFFICULTY
HOS_ADL_SCORE(%): 70.59
HOS_ADL_HIGHEST_POTENTIAL_SCORE: 68
WALKING_DOWN_STEEP_HILLS: MODERATE DIFFICULTY
DEEP_SQUATTING: MODERATE DIFFICULTY
PUTTING_ON_SOCKS_AND_SHOES: SLIGHT DIFFICULTY
GOING_DOWN_1_FLIGHT_OF_STAIRS: SLIGHT DIFFICULTY
HOW_WOULD_YOU_RATE_YOUR_CURRENT_LEVEL_OF_FUNCTION_DURING_YOUR_USUAL_ACTIVITIES_OF_DAILY_LIVING_FROM_0_TO_100_WITH_100_BEING_YOUR_LEVEL_OF_FUNCTION_PRIOR_TO_YOUR_HIP_PROBLEM_AND_0_BEING_THE_INABILITY_TO_PERFORM_ANY_OF_YOUR_USUAL_DAILY_ACTIVITIES?: 60
WALKING_UP_STEEP_HILLS: MODERATE DIFFICULTY
SITTING_FOR_15_MINUTES: MODERATE DIFFICULTY
STEPPING_UP_AND_DOWN_CURBS: NO DIFFICULTY AT ALL

## 2021-09-13 NOTE — PROGRESS NOTES
"Physical Therapy Initial Evaluation  Subjective:    Patient Health History  Shala Morales being seen for JULIETTE of left hip with groin pain.     Problem began: 9/12/2020.   Problem occurred: Developed slowly over time, bone formation abnormality   Pain is reported as 2/10 (increases to 6-7/10) on pain scale.  General health as reported by patient is good.  Pertinent medical history includes: other. Other medical history details: Pelvic pain.   Red flags:  None as reported by patient.  Medical allergies: none.   Surgeries include:  None.    Current medications:  Anti-depressants, anti-seizure medication and hormone replacement therapy (vaginal cream-started early 2020, uses 2x/wk).    Current occupation is Unemployed.   Primary job tasks include:  Other.   Other job/home tasks details: A combo of sitting and standing.                Therapist Generated HPI Evaluation  Problem details: Date of MD order for this episode was 8-18-21. Shala is having L hip pain and was dx with JULIETTE. She has had the hip pain for about 1.5 years. 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 She  had an EMG and was seen by neurology for her back and this was WNL. A lumbar MRI showed some degenerative changes. She is on duloxetine which is helpful along with gabapentin. She is currently getting accupuncture 1x/wk and this has also been very helpful.  The general pain/symptoms have improved and now she is noticing more consistent pain in the L groin, lateral hip, sometimes iliac crest/psis and L upper inner thigh.  Very minimal pain in the R hip, \"nothing like the L\". The pain is much less if does not exercise, it will return if she walks a lot.   Walking-every other day, 3 miles(about 40 min), she feels she needs the " day between but wants the exercise to be more beneficial.   Shala states she does not have the urgency or bladder burning anymore but will get some frequency if doing too much sit, stand, walk etc. The frequency would be hourly for a few hours and will resolve if she works on relaxing.  Has not tried intercourse. Did get clearance form MD she saw for vulvodynia. .         Type of problem:  Left hip.    This is a chronic condition.  Condition occurred with:  Insidious onset.  Where condition occurred: for unknown reasons.  Patient reports pain:  Groin, lateral and greater trochanter.  Pain is described as aching and other (pinching) and is constant.  Pain radiates to:  Thigh (upper medial). Pain is worse in the P.M. (wakes feeling pretty good, longer up and active the more symptoms she will have).  Since onset symptoms are gradually worsening.  Associated symptoms:  Loss of motion/stiffness and loss of strength. Symptoms are exacerbated by standing, sitting, walking, weight bearing, other, ascending stairs and descending stairs (prolonged is worse)  and relieved by rest and other (gabapentin, duloxetine).  Special tests included:  X-ray.    Work activity restrictions: not working.  Barriers include:  None as reported by patient.                        Objective:  Standing Alignment:                  General deviations alignment: pelvic asymmetry supine, legs equal at malleoli , sits on L side-habit now, not due to pain.  Gait:    Gait Type:  Normal   Assistive Devices:  None                 Lumbar/SI Evaluation  ROM:    AROM Lumbar:   Flexion:            100% with L hip pain, can flare pain  Ext:                    100%   Side Bend:        Left:  100%    Right:  100%  Rotation:           Left:  100%    Right:  100%  Side Glide:        Left:     Right:                 Lumbar Dermtomes:  normal                        Spinal Segmental Conclusions: P/a glides lumbar mobile and painfree                                 "  Pelvic Dysfunction Evaluation:              SI Provocation:    Positive for: Fabres and ASLR  Negative for:  SI Compression                      Hip Evaluation  HIP AROM:    Flexion: Left: 90   Right:                   Hip PROM:    Flexion: Left: 110  Right:        Internal Rotation: Left: limited by pain   Right:  External Rotation: Left: endrange limited by pain   Right:              Hip Strength:    Flexion:   Left: 4+/5   Pain:  Right: 5/5   Pain:                    Extension:  Left: 4+/5  Pain:Right: 5/5    Pain:    Abduction:  Left: 4/5     Pain:Right: 5/5    Pain:    Internal Rotation:  Left: 5/5    Pain:Right: 5/5   Pain:  External Rotation:  Left: 4/5   Pain:  Right: 5/5   Pain:  Knee Flexion:  Left: 5/5   Pain:Right: 5/5   Pain:  Knee Extension:  Left: 5/5   Pain:Right: 5/5    Pain:        Hip Special Testing:    Left hip positive for the following special tests:  Jack and Fadir/Labrum  Left hip negative for the following special tests:  SLR      Hip Palpation:  Palpations normal left hip: palpation just sup and lat L pube=bladder pain, just tender over coccyx/not painful.  Left hip tenderness present at:   hip flexors and Pubis  Left hip tenderness not present at:  Ischial Tuberosity; Greater Trachanter; Piriformis; PSIS; ASIS; Adductors or Gluteus Medius    Right hip tenderness not present at:  Ischial Tuberosity; Greater Trachanter; hip flexors; Piriformis; PSIS; ASIS; Adductors; Gluteus Medius or Pubis  Functional Testing:          Quad:    Single leg squat:   Left:    Significant loss of control, femoral IR and excessive anterior knee excursion  Right:   Moderate loss of control, femoral IR and excessive anterior knee excursion    Bilateral leg squat:  Mild loss of control and excessive anterior knee excursion     Proprioception:    Stork balance test:   Left:    Floor EO 24\", EC 3\"  Right:  Floor EO 30+\", EC 5\"  % of Uninvolved:                General     ROS    Assessment/Plan:    Patient is a 46 " year old female with left side hip complaints.    Patient has the following significant findings with corresponding treatment plan.                Diagnosis 1:  L hip pain  Pain -  manual therapy, self management, education and home program  Decreased ROM/flexibility - manual therapy, therapeutic exercise and home program  Decreased joint mobility - manual therapy, therapeutic exercise and home program  Decreased strength - therapeutic exercise, therapeutic activities and home program  Impaired balance - neuro re-education, therapeutic activities and home program  Decreased proprioception - neuro re-education, therapeutic activities and home program  Impaired muscle performance - neuro re-education and home program  Decreased function - therapeutic activities and home program  Impaired posture - neuro re-education and home program    Therapy Evaluation Codes:   1) History comprised of:   Personal factors that impact the plan of care:      Past/current experiences and Time since onset of symptoms.    Comorbidity factors that impact the plan of care are:      None.     Medications impacting care: gabapentin, duloxetine.  2) Examination of Body Systems comprised of:   Body structures and functions that impact the plan of care:      Hip, Lumbar spine, Pelvis and Sacral illiac joint.   Activity limitations that impact the plan of care are:      Dressing, Sitting, Sports, Squatting/kneeling, Stairs, Standing, Walking, Sleeping and Laying down.  3) Clinical presentation characteristics are:   Stable/Uncomplicated.  4) Decision-Making    Low complexity using standardized patient assessment instrument and/or measureable assessment of functional outcome.  Cumulative Therapy Evaluation is: Low complexity.    Previous and current functional limitations:  (See Goal Flow Sheet for this information)    Short term and Long term goals: (See Goal Flow Sheet for this information)     Communication ability:  Patient appears to be able  to clearly communicate and understand verbal and written communication and follow directions correctly.  Treatment Explanation - The following has been discussed with the patient:   RX ordered/plan of care  Anticipated outcomes  Possible risks and side effects  This patient would benefit from PT intervention to resume normal activities.   Rehab potential is good.    Frequency:  1 X week, once daily  Duration:  for 6 weeks  Discharge Plan:  Achieve all LTG.  Independent in home treatment program.  Reach maximal therapeutic benefit.    Please refer to the daily flowsheet for treatment today, total treatment time and time spent performing 1:1 timed codes.

## 2021-09-15 ENCOUNTER — OFFICE VISIT (OUTPATIENT)
Dept: ORTHOPEDICS | Facility: CLINIC | Age: 46
End: 2021-09-15
Payer: COMMERCIAL

## 2021-09-15 VITALS — BODY MASS INDEX: 25.18 KG/M2 | HEIGHT: 69 IN | WEIGHT: 170 LBS

## 2021-09-15 DIAGNOSIS — M25.851 FEMOROACETABULAR IMPINGEMENT OF BOTH HIPS: ICD-10-CM

## 2021-09-15 DIAGNOSIS — M25.552 HIP PAIN, CHRONIC, LEFT: Primary | ICD-10-CM

## 2021-09-15 DIAGNOSIS — M25.852 FEMOROACETABULAR IMPINGEMENT OF BOTH HIPS: ICD-10-CM

## 2021-09-15 DIAGNOSIS — G89.29 HIP PAIN, CHRONIC, LEFT: Primary | ICD-10-CM

## 2021-09-15 PROCEDURE — 20611 DRAIN/INJ JOINT/BURSA W/US: CPT | Mod: LT | Performed by: FAMILY MEDICINE

## 2021-09-15 RX ADMIN — TRIAMCINOLONE ACETONIDE 40 MG: 40 INJECTION, SUSPENSION INTRA-ARTICULAR; INTRAMUSCULAR at 16:00

## 2021-09-15 RX ADMIN — ROPIVACAINE HYDROCHLORIDE 3 ML: 5 INJECTION, SOLUTION EPIDURAL; INFILTRATION; PERINEURAL at 16:00

## 2021-09-15 ASSESSMENT — MIFFLIN-ST. JEOR: SCORE: 1472.55

## 2021-09-15 NOTE — PROGRESS NOTES
Shala ANSARI Pibal  :  1975  DOS: 9/15/2021  MRN: [id number]    Sports Medicine Clinic Procedure    Ultrasound Guided Left Intra-Articular Hip Injection    Clinical History: Patient presents in follow up for left hip intra-articular injection as previously discussed.    Diagnosis:   1. Hip pain, chronic, left    2. Femoroacetabular impingement of both hips      Large Joint Injection/Arthocentesis: L hip joint    Date/Time: 9/15/2021 4:00 PM  Performed by: Todd Duncan DO  Authorized by: Todd Duncan DO     Indications:  Pain, diagnostic evaluation and osteoarthritis  Needle Size:  22 G  Guidance: ultrasound    Approach:  Anterior  Location:  Hip      Site:  L hip joint  Medications:  3 mL ropivacaine 5 MG/ML; 40 mg triamcinolone 40 MG/ML  Outcome:  Tolerated well, no immediate complications  Procedure discussed: discussed risks, benefits, and alternatives    Consent Given by:  Patient  Timeout: timeout called immediately prior to procedure    Prep: patient was prepped and draped in usual sterile fashion     4 ml's of 1% lidocaine was used as local anesthetic prior to injection        Impression:  Successful Left intra-articular hip injection.    Plan:  Follow up prn based on clinical progress  Expectations and goals of CSI reviewed  Often 2-3 days for steroid effect, and can take up to two weeks for maximum effect  We discussed modified progressive pain-free activity as tolerated  Do not overuse in first two weeks if feeling better due to concern for vulnerability while steroid is working  Supportive care reviewed  All questions were answered today  Contact us with additional questions or concerns  Signs and sx of concern reviewed      Todd Duncan DO, CAQ  Primary Care Sports Medicine  [facility]

## 2021-09-15 NOTE — LETTER
9/15/2021         RE: Shala Morales  [address] [facility] [facility] [address]        Dear Colleague,    Thank you for referring your patient, Shala Morales, to the [facility]. Please see a copy of my visit note below.    Shala Morales  :  1975  DOS: 9/15/2021  MRN: [id number]    Sports Medicine Clinic Procedure    Ultrasound Guided Left Intra-Articular Hip Injection    Clinical History: Patient presents in follow up for left hip intra-articular injection as previously discussed.    Diagnosis:   1. Hip pain, chronic, left    2. Femoroacetabular impingement of both hips      Large Joint Injection/Arthocentesis: L hip joint    Date/Time: 9/15/2021 4:00 PM  Performed by: Todd Duncan DO  Authorized by: Todd Duncan DO     Indications:  Pain, diagnostic evaluation and osteoarthritis  Needle Size:  22 G  Guidance: ultrasound    Approach:  Anterior  Location:  Hip      Site:  L hip joint  Medications:  3 mL ropivacaine 5 MG/ML; 40 mg triamcinolone 40 MG/ML  Outcome:  Tolerated well, no immediate complications  Procedure discussed: discussed risks, benefits, and alternatives    Consent Given by:  Patient  Timeout: timeout called immediately prior to procedure    Prep: patient was prepped and draped in usual sterile fashion     4 ml's of 1% lidocaine was used as local anesthetic prior to injection        Impression:  Successful Left intra-articular hip injection.    Plan:  Follow up prn based on clinical progress  Expectations and goals of CSI reviewed  Often 2-3 days for steroid effect, and can take up to two weeks for maximum effect  We discussed modified progressive pain-free activity as tolerated  Do not overuse in first two weeks if feeling better due to concern for vulnerability while steroid is working  Supportive care reviewed  All questions were answered today  Contact us with additional questions or concerns  Signs and sx of  concern reviewed      Todd Duncan DO, TAMMY  Primary Care Sports Medicine  [facility]           Again, thank you for allowing me to participate in the care of your patient.        Sincerely,        Todd Duncan DO

## 2021-09-20 RX ORDER — TRIAMCINOLONE ACETONIDE 40 MG/ML
40 INJECTION, SUSPENSION INTRA-ARTICULAR; INTRAMUSCULAR
Status: DISCONTINUED | OUTPATIENT
Start: 2021-09-15 | End: 2022-01-19

## 2021-09-20 RX ORDER — ROPIVACAINE HYDROCHLORIDE 5 MG/ML
3 INJECTION, SOLUTION EPIDURAL; INFILTRATION; PERINEURAL
Status: DISCONTINUED | OUTPATIENT
Start: 2021-09-15 | End: 2022-01-19

## 2021-09-27 ENCOUNTER — THERAPY VISIT (OUTPATIENT)
Dept: PHYSICAL THERAPY | Facility: CLINIC | Age: 46
End: 2021-09-27
Payer: COMMERCIAL

## 2021-09-27 DIAGNOSIS — M25.552 HIP PAIN, LEFT: ICD-10-CM

## 2021-09-27 PROCEDURE — 97140 MANUAL THERAPY 1/> REGIONS: CPT | Mod: GP | Performed by: PHYSICAL THERAPIST

## 2021-09-27 PROCEDURE — 97110 THERAPEUTIC EXERCISES: CPT | Mod: GP | Performed by: PHYSICAL THERAPIST

## 2021-09-27 PROCEDURE — 97112 NEUROMUSCULAR REEDUCATION: CPT | Mod: GP | Performed by: PHYSICAL THERAPIST

## 2021-10-04 ENCOUNTER — THERAPY VISIT (OUTPATIENT)
Dept: PHYSICAL THERAPY | Facility: CLINIC | Age: 46
End: 2021-10-04
Payer: COMMERCIAL

## 2021-10-04 DIAGNOSIS — M25.552 HIP PAIN, LEFT: ICD-10-CM

## 2021-10-04 PROCEDURE — 97110 THERAPEUTIC EXERCISES: CPT | Mod: GP | Performed by: PHYSICAL THERAPIST

## 2021-10-04 PROCEDURE — 97140 MANUAL THERAPY 1/> REGIONS: CPT | Mod: GP | Performed by: PHYSICAL THERAPIST

## 2021-10-04 PROCEDURE — 97112 NEUROMUSCULAR REEDUCATION: CPT | Mod: GP | Performed by: PHYSICAL THERAPIST

## 2021-10-14 ENCOUNTER — VIRTUAL VISIT (OUTPATIENT)
Dept: NEUROLOGY | Facility: CLINIC | Age: 46
End: 2021-10-14
Payer: COMMERCIAL

## 2021-10-14 DIAGNOSIS — G62.9 PERIPHERAL POLYNEUROPATHY: ICD-10-CM

## 2021-10-14 DIAGNOSIS — M54.16 LUMBAR RADICULOPATHY: ICD-10-CM

## 2021-10-14 PROCEDURE — 99213 OFFICE O/P EST LOW 20 MIN: CPT | Mod: GT | Performed by: PSYCHIATRY & NEUROLOGY

## 2021-10-14 RX ORDER — DULOXETIN HYDROCHLORIDE 60 MG/1
60 CAPSULE, DELAYED RELEASE ORAL AT BEDTIME
Qty: 90 CAPSULE | Refills: 3 | Status: SHIPPED | OUTPATIENT
Start: 2021-10-14 | End: 2022-06-21

## 2021-10-14 RX ORDER — GABAPENTIN 600 MG/1
600 TABLET ORAL 3 TIMES DAILY
Qty: 120 TABLET | Refills: 8 | Status: SHIPPED | OUTPATIENT
Start: 2021-10-14 | End: 2022-06-21

## 2021-10-14 NOTE — LETTER
10/14/2021       RE: Shala Morales  [address] [address]     Dear Colleague,    Thank you for referring your patient, Shala Morales, to the [facility] NEUROLOGY CLINIC [place] at [facility]. Please see a copy of my visit note below.    [facility] Neurology Follow Up Visit    Shala Morales MRN# [id number]   Age: 46 year old YOB: 1975     Brief history of symptoms: The patient was initially seen in neurologic consultation on 4/9/2021 and again/most recently 6/10/2021 for evaluation of peripheral pelvic pain. Please see the comprehensive neurologic consultation notes from those dates in the Epic records for details.     She described clinically a Right L4 radiculopathy, and had pain characteristic for L1 or L2 radicular process leading to ilioinguinal and genitofemoral nerve distribution related pain as well as perineal nerve distribution pain and femoral medial thigh pain.      MRI lumbar spine on 5/1/2021 was revealing for only mild lumbar spondylosis at L3-4, L4-5 and L5-S1 due to mild and circumferential disc bulges.  There was mild-moderate neuroforaminal narrowing at multiple levels as well (L4-5 b/l, L5-S1 b/l).  B6/B12/Vitamin D were all normal on 4/9/2021.  Further testing (6/15/2021) with SSA, SSB, LGI1 Igg, CASPR2 Igg, NANCY, RH, endomysial, Lyme, SPEP, Immunofixation, CRP, ESR, ANCA, paraneoplastic panel was negative.     EMG 7/13/2021 was normal    At our last visit, there was noted improvement in her deep vaginal pain once starting on Cymbalta (pain of 5/10 instead of 10/10).  She also started acupuncture, which has helped a lot (focus pain down to specific regions).   Pelvic floor physical therapy has been helpful to some degree, specifically when focusing on the obturator internus (tightness).  When the obturator is palpated, she gets some bladder/vaginal discomfort pain followed by intense pain hours after.   The patient does piriformis stretching daily, and no improvement in her symptoms is noted with this stretching.  Consideration into spinal and brain MRI was made along with small fiber nerve testing given b/l nature of symptoms and response to neuropathic medications, especially if EMG and serum testing was normal.    Today, the patient still has localized deep vaginal pain.  On most days, her pain is 3-4/10 with use of duloxetine.  There are no new symptoms, like incontinence.  She does have urethral sensitivity.  She also reported left hip pain and groin pain, which led to orthopedists evaluation.  Left sided hip impingement was noted, greater than left.  A cortisone injection was given on the left, which helped with left hip pain but not necessarily the deeper vaginal pain.         Assessment and Plan:   Assessment:  Pelvic pain, idiopathic     There is limited findings on EMG to give weight to a localized compression near a root or branch of the lumbar plexus causing the patient's pain.  Her issues could be due to localized nerve irritation versus referred, but I wouldn't necessarily expand her imaging to higher spinal levels at this time given lack of other progressive symptoms.  Her response to neuropathic agents certainly indicates a neuropathic involvement, but sadly I cannot make a clearer diagnosis than that at this time.  At this point, I would recommend the patient seek a further opinion with an OBGYN as well as consider a pain clinic referral for possible localized numbing agent/injection while continuing on her current regiment of neuropathic medications which have made life functional and bearable.     Plan:  Duloxetine 60 mg at bedtime  Gabapentin 600 mg TID  Consider referral to pain clinic here at the [facility] should patient request it    Follow up in Neurology clinic in 8 months or earlier as needed should new concerns arise.    DONATO Zhou D.O.   of Neurology    Total time  today (23 min) in this patient encounter was spent on pre-charting, counseling and/or coordination of care.         Again, thank you for allowing me to participate in the care of your patient.      Sincerely,    Stefan Zhou, DO

## 2021-10-14 NOTE — PROGRESS NOTES
[facility] Neurology Follow Up Visit    Shala Morales MRN# [id number]   Age: 46 year old YOB: 1975     Brief history of symptoms: The patient was initially seen in neurologic consultation on 4/9/2021 and again/most recently 6/10/2021 for evaluation of peripheral pelvic pain. Please see the comprehensive neurologic consultation notes from those dates in the Epic records for details.     She described clinically a Right L4 radiculopathy, and had pain characteristic for L1 or L2 radicular process leading to ilioinguinal and genitofemoral nerve distribution related pain as well as perineal nerve distribution pain and femoral medial thigh pain.      MRI lumbar spine on 5/1/2021 was revealing for only mild lumbar spondylosis at L3-4, L4-5 and L5-S1 due to mild and circumferential disc bulges.  There was mild-moderate neuroforaminal narrowing at multiple levels as well (L4-5 b/l, L5-S1 b/l).  B6/B12/Vitamin D were all normal on 4/9/2021.  Further testing (6/15/2021) with SSA, SSB, LGI1 Igg, CASPR2 Igg, NANCY, RH, endomysial, Lyme, SPEP, Immunofixation, CRP, ESR, ANCA, paraneoplastic panel was negative.     EMG 7/13/2021 was normal    At our last visit, there was noted improvement in her deep vaginal pain once starting on Cymbalta (pain of 5/10 instead of 10/10).  She also started acupuncture, which has helped a lot (focus pain down to specific regions).   Pelvic floor physical therapy has been helpful to some degree, specifically when focusing on the obturator internus (tightness).  When the obturator is palpated, she gets some bladder/vaginal discomfort pain followed by intense pain hours after.  The patient does piriformis stretching daily, and no improvement in her symptoms is noted with this stretching.  Consideration into spinal and brain MRI was made along with small fiber nerve testing given b/l nature of symptoms and response to neuropathic medications, especially if EMG and serum testing was  normal.    Today, the patient still has localized deep vaginal pain.  On most days, her pain is 3-4/10 with use of duloxetine.  There are no new symptoms, like incontinence.  She does have urethral sensitivity.  She also reported left hip pain and groin pain, which led to orthopedists evaluation.  Left sided hip impingement was noted, greater than left.  A cortisone injection was given on the left, which helped with left hip pain but not necessarily the deeper vaginal pain.         Assessment and Plan:   Assessment:  Pelvic pain, idiopathic     There is limited findings on EMG to give weight to a localized compression near a root or branch of the lumbar plexus causing the patient's pain.  Her issues could be due to localized nerve irritation versus referred, but I wouldn't necessarily expand her imaging to higher spinal levels at this time given lack of other progressive symptoms.  Her response to neuropathic agents certainly indicates a neuropathic involvement, but sadly I cannot make a clearer diagnosis than that at this time.  At this point, I would recommend the patient seek a further opinion with an OBGYN as well as consider a pain clinic referral for possible localized numbing agent/injection while continuing on her current regiment of neuropathic medications which have made life functional and bearable.     Plan:  Duloxetine 60 mg at bedtime  Gabapentin 600 mg TID  Consider referral to pain clinic here at the [facility] should patient request it    Follow up in Neurology clinic in 8 months or earlier as needed should new concerns arise.    DONATO Zhou D.O.   of Neurology    Total time today (23 min) in this patient encounter was spent on pre-charting, counseling and/or coordination of care.

## 2021-10-19 ENCOUNTER — THERAPY VISIT (OUTPATIENT)
Dept: PHYSICAL THERAPY | Facility: CLINIC | Age: 46
End: 2021-10-19
Payer: COMMERCIAL

## 2021-10-19 DIAGNOSIS — M25.552 HIP PAIN, LEFT: ICD-10-CM

## 2021-10-19 PROCEDURE — 97112 NEUROMUSCULAR REEDUCATION: CPT | Mod: GP | Performed by: PHYSICAL THERAPIST

## 2021-10-19 PROCEDURE — 97140 MANUAL THERAPY 1/> REGIONS: CPT | Mod: GP | Performed by: PHYSICAL THERAPIST

## 2021-10-19 PROCEDURE — 97110 THERAPEUTIC EXERCISES: CPT | Mod: GP | Performed by: PHYSICAL THERAPIST

## 2021-12-15 DIAGNOSIS — R10.2 PELVIC PAIN IN FEMALE: ICD-10-CM

## 2021-12-15 DIAGNOSIS — M62.89 PELVIC FLOOR DYSFUNCTION IN FEMALE: ICD-10-CM

## 2021-12-15 RX ORDER — DIAZEPAM 2.5 MG/.5ML
GEL RECTAL
Qty: 10 SUPPOSITORY | Refills: 3 | Status: SHIPPED | OUTPATIENT
Start: 2021-12-15

## 2021-12-29 ENCOUNTER — TELEPHONE (OUTPATIENT)
Dept: FAMILY MEDICINE | Facility: CLINIC | Age: 46
End: 2021-12-29
Payer: COMMERCIAL

## 2021-12-29 DIAGNOSIS — G62.9 PERIPHERAL POLYNEUROPATHY: Primary | ICD-10-CM

## 2021-12-29 DIAGNOSIS — M54.16 LUMBAR RADICULOPATHY: ICD-10-CM

## 2021-12-29 DIAGNOSIS — R10.2 PELVIC PAIN IN FEMALE: ICD-10-CM

## 2021-12-29 NOTE — TELEPHONE ENCOUNTER
Please abstract the following data from this visit with this patient into the appropriate field in Epic:    Tests that can be patient reported without a hard copy:        Other Tests found in the patient's chart through Chart Review/Care Everywhere:    Pap smear done by this group [facility] on this date: 3/24/21 and HPV done by this group  [facility]  on this date: 3/24/21, under documents tab not lab.     Note to [employer]: If this section is blank, no results were found via Chart Review/Care Everywhere.

## 2022-01-02 ENCOUNTER — HEALTH MAINTENANCE LETTER (OUTPATIENT)
Age: 47
End: 2022-01-02

## 2022-01-18 ASSESSMENT — ENCOUNTER SYMPTOMS
SHORTNESS OF BREATH: 0
WEAKNESS: 0
DIZZINESS: 0
PARESTHESIAS: 0
SORE THROAT: 0
FREQUENCY: 0
HEADACHES: 0
COUGH: 0
DIARRHEA: 0
PALPITATIONS: 0
DYSURIA: 0
JOINT SWELLING: 0
CONSTIPATION: 1
CHILLS: 0
NERVOUS/ANXIOUS: 1
HEMATURIA: 0
ABDOMINAL PAIN: 1
EYE PAIN: 0
NAUSEA: 0
ARTHRALGIAS: 1
FEVER: 0
BREAST MASS: 0
HEARTBURN: 1
HEMATOCHEZIA: 0
MYALGIAS: 1

## 2022-01-18 ASSESSMENT — PATIENT HEALTH QUESTIONNAIRE - PHQ9
10. IF YOU CHECKED OFF ANY PROBLEMS, HOW DIFFICULT HAVE THESE PROBLEMS MADE IT FOR YOU TO DO YOUR WORK, TAKE CARE OF THINGS AT HOME, OR GET ALONG WITH OTHER PEOPLE: SOMEWHAT DIFFICULT
SUM OF ALL RESPONSES TO PHQ QUESTIONS 1-9: 11
SUM OF ALL RESPONSES TO PHQ QUESTIONS 1-9: 11

## 2022-01-19 ENCOUNTER — OFFICE VISIT (OUTPATIENT)
Dept: FAMILY MEDICINE | Facility: CLINIC | Age: 47
End: 2022-01-19
Payer: COMMERCIAL

## 2022-01-19 ENCOUNTER — TELEPHONE (OUTPATIENT)
Dept: GASTROENTEROLOGY | Facility: CLINIC | Age: 47
End: 2022-01-19

## 2022-01-19 VITALS
BODY MASS INDEX: 28.22 KG/M2 | TEMPERATURE: 98.7 F | SYSTOLIC BLOOD PRESSURE: 137 MMHG | RESPIRATION RATE: 20 BRPM | WEIGHT: 186.2 LBS | DIASTOLIC BLOOD PRESSURE: 80 MMHG | OXYGEN SATURATION: 98 % | HEART RATE: 70 BPM | HEIGHT: 68 IN

## 2022-01-19 DIAGNOSIS — Z12.11 SCREEN FOR COLON CANCER: ICD-10-CM

## 2022-01-19 DIAGNOSIS — F10.21 HISTORY OF ALCOHOLISM (H): ICD-10-CM

## 2022-01-19 DIAGNOSIS — Z00.00 ROUTINE GENERAL MEDICAL EXAMINATION AT A HEALTH CARE FACILITY: Primary | ICD-10-CM

## 2022-01-19 DIAGNOSIS — K59.00 CONSTIPATION, UNSPECIFIED CONSTIPATION TYPE: ICD-10-CM

## 2022-01-19 DIAGNOSIS — R14.0 BLOATING: ICD-10-CM

## 2022-01-19 DIAGNOSIS — F43.21 ADJUSTMENT DISORDER WITH DEPRESSED MOOD: ICD-10-CM

## 2022-01-19 DIAGNOSIS — R63.5 WEIGHT GAIN: ICD-10-CM

## 2022-01-19 LAB
CHOLEST SERPL-MCNC: 170 MG/DL
FASTING STATUS PATIENT QL REPORTED: NO
FASTING STATUS PATIENT QL REPORTED: NO
GLUCOSE BLD-MCNC: 90 MG/DL (ref 70–99)
HDLC SERPL-MCNC: 79 MG/DL
LDLC SERPL CALC-MCNC: 84 MG/DL
NONHDLC SERPL-MCNC: 91 MG/DL
TRIGL SERPL-MCNC: 36 MG/DL
TSH SERPL DL<=0.005 MIU/L-ACNC: 1.23 MU/L (ref 0.4–4)

## 2022-01-19 PROCEDURE — 82947 ASSAY GLUCOSE BLOOD QUANT: CPT | Performed by: NURSE PRACTITIONER

## 2022-01-19 PROCEDURE — 99213 OFFICE O/P EST LOW 20 MIN: CPT | Mod: 25 | Performed by: NURSE PRACTITIONER

## 2022-01-19 PROCEDURE — 80061 LIPID PANEL: CPT | Performed by: NURSE PRACTITIONER

## 2022-01-19 PROCEDURE — 84443 ASSAY THYROID STIM HORMONE: CPT | Performed by: NURSE PRACTITIONER

## 2022-01-19 PROCEDURE — 86803 HEPATITIS C AB TEST: CPT | Performed by: NURSE PRACTITIONER

## 2022-01-19 PROCEDURE — 36415 COLL VENOUS BLD VENIPUNCTURE: CPT | Performed by: NURSE PRACTITIONER

## 2022-01-19 PROCEDURE — 99396 PREV VISIT EST AGE 40-64: CPT | Performed by: NURSE PRACTITIONER

## 2022-01-19 RX ORDER — SENNOSIDES 8.6 MG
1 TABLET ORAL DAILY
COMMUNITY
End: 2023-01-24

## 2022-01-19 RX ORDER — DOCUSATE SODIUM 100 MG/1
100 CAPSULE, LIQUID FILLED ORAL 2 TIMES DAILY
COMMUNITY
End: 2023-01-24

## 2022-01-19 RX ORDER — POLYETHYLENE GLYCOL 3350 17 G/17G
1 POWDER, FOR SOLUTION ORAL DAILY
COMMUNITY
End: 2023-01-24

## 2022-01-19 ASSESSMENT — ENCOUNTER SYMPTOMS
DIARRHEA: 0
NERVOUS/ANXIOUS: 1
PARESTHESIAS: 0
SHORTNESS OF BREATH: 0
DYSURIA: 0
HEADACHES: 0
ARTHRALGIAS: 1
JOINT SWELLING: 0
FEVER: 0
CHILLS: 0
CONSTIPATION: 1
MYALGIAS: 1
DIZZINESS: 0
NAUSEA: 0
FREQUENCY: 0
COUGH: 0
EYE PAIN: 0
HEMATURIA: 0
WEAKNESS: 0
HEARTBURN: 1
ABDOMINAL PAIN: 1
SORE THROAT: 0
PALPITATIONS: 0
BREAST MASS: 0
HEMATOCHEZIA: 0

## 2022-01-19 ASSESSMENT — PATIENT HEALTH QUESTIONNAIRE - PHQ9: SUM OF ALL RESPONSES TO PHQ QUESTIONS 1-9: 11

## 2022-01-19 ASSESSMENT — MIFFLIN-ST. JEOR: SCORE: 1544.45

## 2022-01-19 NOTE — RESULT ENCOUNTER NOTE
Dear Shala,    Your recent test results are attached.      Normal glucose.  Excellent cholesterol.  Normal thyroid.      If you have any questions please feel free to contact [phone] or myself via [employer].    Sincerely,  Rita Mohr, CNP

## 2022-01-19 NOTE — PROGRESS NOTES
SUBJECTIVE:   CC: Shala Morales is an 46 year old woman who presents for preventive health visit.     Patient has been advised of split billing requirements and indicates understanding: Yes     Healthy Habits:     Getting at least 3 servings of Calcium per day:  Yes    Bi-annual eye exam:  Yes    Dental care twice a year:  Yes    Sleep apnea or symptoms of sleep apnea:  None    Diet:  Vegetarian/vegan    Frequency of exercise:  4-5 days/week    Duration of exercise:  30-45 minutes    Taking medications regularly:  Yes    Medication side effects:  Other    PHQ-2 Total Score: 3    Additional concerns today:  Yes    Taking senna-s 2-3 tabs daily, miralax 17 gm, colace 2 tabs daily.  She will have to take milk of mag at times as well.  Patient feels bloated.  She denies melena, hematochezia.  She will also have abdominal pain to left side as well after eating.    Patient continues to gain weight and exercising regularly and cutting back on calories without improvement.    Patient notes depressed mood due to chronic pain.  She continues to struggle with now chronic nerve pain.  Pain has improved with gabapentin, duloxetine, but can change day to day.  Has seen multiple specialists for this and will be seeing pain management next.  Would like to possibly explore a pudendal nerve block, but still feels frustrated by lack of answers.              Today's PHQ-2 Score:   PHQ-2 ( 1999 Pf[employer]) 1/18/2022   Q1: Little interest or pleasure in doing things 1   Q2: Feeling down, depressed or hopeless 2   PHQ-2 Score 3   PHQ-2 Total Score (12-17 Years)- Positive if 3 or more points; Administer PHQ-A if positive -   Q1: Little interest or pleasure in doing things Several days   Q2: Feeling down, depressed or hopeless More than half the days   PHQ-2 Score 3       Abuse: Current or Past (Physical, Sexual or Emotional) - No  Do you feel safe in your environment? Yes    Have you ever done Advance Care Planning? (For example, a  Health Directive, POLST, or a discussion with a medical provider or your loved ones about your wishes): No, advance care planning information given to patient to review.  Patient declined advance care planning discussion at this time.    Social History     Tobacco Use     Smoking status: Former Smoker     Packs/day: 1.50     Years: 15.00     Pack years: 22.50     Types: Cigarettes     Start date: 1994     Quit date: 10/1/2019     Years since quittin.3     Smokeless tobacco: Never Used     Tobacco comment: Stopped smoking cigarettes  but then started using E cigarette which I then quit using    Substance Use Topics     Alcohol use: Not Currently     Comment: Recovering alcoholic, sobriety date 3/21/2012     If you drink alcohol do you typically have >3 drinks per day or >7 drinks per week? No    No flowsheet data found.    Reviewed orders with patient.  Reviewed health maintenance and updated orders accordingly - Yes  Lab work is in process  BP Readings from Last 3 Encounters:   22 137/80   21 123/78   21 115/79    Wt Readings from Last 3 Encounters:   22 84.5 kg (186 lb 3.2 oz)   09/15/21 77.1 kg (170 lb)   21 77.1 kg (170 lb)                  Patient Active Problem List   Diagnosis     HPV in female     Symptomatic menopausal or female climacteric states     Biliary sludge determined by ultrasound     History of hypertension     History of depression     History of alcoholism (H)     Intermittent right upper quadrant abdominal pain     Lung granuloma (H)     Vegan diet     Hyperlipidemia LDL goal <130     Hip pain, left     Past Surgical History:   Procedure Laterality Date     BIOPSY  2018    Cervical biopsy       Social History     Tobacco Use     Smoking status: Former Smoker     Packs/day: 1.50     Years: 15.00     Pack years: 22.50     Types: Cigarettes     Start date: 1994     Quit date: 10/1/2019     Years since quittin.3     Smokeless tobacco: Never  Used     Tobacco comment: Stopped smoking cigarettes 12/18 but then started using E cigarette which I then quit using 2/19   Substance Use Topics     Alcohol use: Not Currently     Comment: Recovering alcoholic, sobriety date 3/21/2012     Family History   Problem Relation Age of Onset     Thyroid Disease Mother      Hypertension Mother      Diabetes Father      Hypertension Father      Depression Father      Mental Illness Father      Obesity Father      Thyroid Disease Maternal Grandmother      Hypertension Maternal Grandmother      Hypertension Maternal Grandfather      Hypertension Paternal Grandmother      Diabetes Paternal Grandfather      Prostate Cancer Paternal Grandfather      Hypertension Paternal Grandfather          Current Outpatient Medications   Medication Sig Dispense Refill     crisaborole (EUCRISA) 2 % ointment Apply topically 2 times daily       diazepam 10 mg SUPP Place vaginally or rectally daily as needed. 10 suppository 3     docusate sodium (COLACE) 100 MG capsule Take 100 mg by mouth 2 times daily       DULoxetine (CYMBALTA) 60 MG capsule Take 1 capsule (60 mg) by mouth At Bedtime 90 capsule 3     estradiol (ESTRACE) 0.1 MG/GM vaginal cream APPLY SMALL AMOUNT TO AFFECTED AREA DAILY FOR 2 WEEKS       gabapentin (NEURONTIN) 600 MG tablet Take 1 tablet (600 mg) by mouth 3 times daily 120 tablet 8     ivermectin (SOOLANTRA) 1 % cream        lidocaine (XYLOCAINE) 2 % external gel APPLY A SMALL AMOUNT TO THE AFFECTED AREA TOPICALLY TWICE DAILY       polyethylene glycol (MIRALAX) 17 g packet Take 1 packet by mouth daily       sennosides (SENOKOT) 8.6 MG tablet Take 1 tablet by mouth daily       vitamin D3 (CHOLECALCIFEROL) 2000 units (50 mcg) tablet Take 1 tablet by mouth daily       No Known Allergies    Breast Cancer Screening:    FHS-7:   Breast CA Risk Assessment (FHS-7) 7/15/2021 1/18/2022   Did any of your first-degree relatives have breast or ovarian cancer? No No   Did any of your  relatives have bilateral breast cancer? No No   Did any man in your family have breast cancer? No No   Did any woman in your family have breast and ovarian cancer? No No   Did any woman in your family have breast cancer before age 50 y? No No   Do you have 2 or more relatives with breast and/or ovarian cancer? No No   Do you have 2 or more relatives with breast and/or bowel cancer? No No       Mammogram Screening: Recommended annual mammography  Pertinent mammograms are reviewed under the imaging tab.    History of abnormal Pap smear: NO - age 30-65 PAP every 5 years with negative HPV co-testing recommended     Reviewed and updated as needed this visit by clinical staff  Tobacco  Allergies  Meds  Problems  Med Hx  Surg Hx  Fam Hx  Soc Hx         Reviewed and updated as needed this visit by Provider  Tobacco  Allergies  Meds  Problems  Med Hx  Surg Hx  Fam Hx            Review of Systems   Constitutional: Negative for chills and fever.   HENT: Negative for congestion, ear pain, hearing loss and sore throat.    Eyes: Negative for pain and visual disturbance.   Respiratory: Negative for cough and shortness of breath.    Cardiovascular: Negative for chest pain, palpitations and peripheral edema.   Gastrointestinal: Positive for abdominal pain, constipation and heartburn. Negative for diarrhea, hematochezia and nausea.   Breasts:  Negative for tenderness, breast mass and discharge.   Genitourinary: Positive for pelvic pain. Negative for dysuria, frequency, genital sores, hematuria, urgency, vaginal bleeding and vaginal discharge.   Musculoskeletal: Positive for arthralgias and myalgias. Negative for joint swelling.   Skin: Negative for rash.   Neurological: Negative for dizziness, weakness, headaches and paresthesias.   Psychiatric/Behavioral: Negative for mood changes. The patient is nervous/anxious.           OBJECTIVE:   /80 (Patient Position: Sitting, Cuff Size: Adult Regular)   Pulse 70   Temp  "98.7  F (37.1  C) (Oral)   Resp 20   Ht 1.737 m (5' 8.4\")   Wt 84.5 kg (186 lb 3.2 oz)   SpO2 98%   BMI 27.98 kg/m    Physical Exam  GENERAL: healthy, alert and no distress  EYES: Eyes grossly normal to inspection, PERRL and conjunctivae and sclerae normal  HENT: ear canals and TM's normal, nose and mouth without ulcers or lesions  NECK: no adenopathy, no asymmetry, masses, or scars and thyroid normal to palpation  RESP: lungs clear to auscultation - no rales, rhonchi or wheezes  CV: regular rate and rhythm, normal S1 S2, no S3 or S4, no murmur, click or rub, no peripheral edema and peripheral pulses strong  ABDOMEN: tenderness LLQ, no organomegaly or masses, bowel sounds normal and no palpable or pulsatile masses  MS: no gross musculoskeletal defects noted, no edema    Diagnostic Test Results:  pending    ASSESSMENT/PLAN:   (Z00.00) Routine general medical examination at a health care facility  (primary encounter diagnosis)  Comment:   Plan: GLUCOSE, Lipid panel reflex to direct LDL         Fasting            (F10.21) History of alcoholism (H)  Comment:   Plan: patient doing well with sobriety.    (F43.21) Adjustment disorder with depressed mood  Comment: patient to seek counseling with pain psychology.  Plan: Adult Mental Health Referral            (R14.0) Bloating  Comment:   Plan: CT Abdomen Pelvis w Contrast            (K59.00) Constipation, unspecified constipation type  Comment:   Plan: Adult Gastro Ref - Consult Only            (Z12.11) Screen for colon cancer  Comment:   Plan: Adult Gastro Ref - Procedure Only            (R63.5) Weight gain  Comment: Likely due to gabapentin.  Patient to consider lyrica as an alternative.  Plan: Hepatitis C antibody, TSH with free T4 reflex              Patient has been advised of split billing requirements and indicates understanding: Yes    COUNSELING:  Reviewed preventive health counseling, as reflected in patient instructions       Regular exercise       Healthy " "diet/nutrition    Estimated body mass index is 27.98 kg/m  as calculated from the following:    Height as of this encounter: 1.737 m (5' 8.4\").    Weight as of this encounter: 84.5 kg (186 lb 3.2 oz).    Weight management plan: Discussed healthy diet and exercise guidelines    She reports that she quit smoking about 2 years ago. Her smoking use included cigarettes. She started smoking about 28 years ago. She has a 22.50 pack-year smoking history. She has never used smokeless tobacco.      Counseling Resources:  ATP IV Guidelines  Pooled Cohorts Equation Calculator  Breast Cancer Risk Calculator  BRCA-Related Cancer Risk Assessment: FHS-7 Tool  FRAX Risk Assessment  ICSI Preventive Guidelines  Dietary Guidelines for Americans, 2010  USDA's MyPlate  ASA Prophylaxis  Lung CA Screening    MARCIE [facility] [place]  Answers for HPI/ROS submitted by the patient on 1/18/2022  If you checked off any problems, how difficult have these problems made it for you to do your work, take care of things at home, or get along with other people?: Somewhat difficult  PHQ9 TOTAL SCORE: 11      "

## 2022-01-19 NOTE — PATIENT INSTRUCTIONS
Consider Lyrica if you continue to gain weight or topical gabapentin.    Preventive Health Recommendations  Female Ages 40 to 49    Yearly exam:     See your health care provider every year in order to  1. Review health changes.   2. Discuss preventive care.    3. Review your medicines if your doctor prescribed any.      Get a Pap test every three years (unless you have an abnormal result and your provider advises testing more often).      If you get Pap tests with HPV test, you only need to test every 5 years, unless you have an abnormal result. You do not need a Pap test if your uterus was removed (hysterectomy) and you have not had cancer.      You should be tested each year for STDs (sexually transmitted diseases), if you're at risk.     Ask your doctor if you should have a mammogram.      Have a colonoscopy (test for colon cancer) if someone in your family has had colon cancer or polyps before age 50.       Have a cholesterol test every 5 years.       Have a diabetes test (fasting glucose) after age 45. If you are at risk for diabetes, you should have this test every 3 years.    Shots: Get a flu shot each year. Get a tetanus shot every 10 years.     Nutrition:     Eat at least 5 servings of fruits and vegetables each day.    Eat whole-grain bread, whole-wheat pasta and brown rice instead of white grains and rice.    Get adequate Calcium and Vitamin D.      Lifestyle    Exercise at least 150 minutes a week (an average of 30 minutes a day, 5 days a week). This will help you control your weight and prevent disease.    Limit alcohol to one drink per day.    No smoking.     Wear sunscreen to prevent skin cancer.    See your dentist every six months for an exam and cleaning.

## 2022-01-19 NOTE — TELEPHONE ENCOUNTER
Screening Questions  Blue=prep questions Red=location Green=sedation   1. Are you active on mychart? Y    2. What insurance is in the chart? [employer]     3.  Ordering/Referring Provider: Ron    4. BMI 27.3, If greater than 40 review exclusion criteria also will need EXTENDED PREP    5.  Respiratory Screening (If yes to any of the following HOSPITAL setting only):     Do you use daily home oxygen? N  Do you have mod to severe Obstructive Sleep Apnea? N (can be seen at [facility] or hospital setting)    Do you have Pulmonary Hypertension? N   Do you have UNCONTROLLED asthma? N    6. Have you had a heart or lung transplant? N  (If yes, please review exclusion criteria)    7. Are you currently on dialysis?N  (If yes, schedule in HOSPITAL setting only)(If yes, please send Golytely prep)    8. Do you have chronic kidney disease? N (If yes, please send Golytely prep)    9. Have you had a stroke or Transient ischemic attack (TIA) within 6 months? N (If yes, do not schedule at [facility])    10. In the past 6 months, have you had any heart related issues including cardiomyopathy or heart attack? N (If yes, please review exclusion criteria)           If yes, did it require cardiac stenting or other implantable device?  (If yes, please review exclusion criteria)      11. Do you have any implantable devices in your body (pacemaker, defib, LVAD)? N (If yes, schedule at [facility])    12. Do you take nitroglycerin? If yes, how often? N (if yes, schedule at HOSPITAL setting)    13. Are you currently taking any blood thinners?N (If yes- inform patient to follow up with PCP or provider for follow up instructions)     14. Are you a diabetic? N (If yes, please send Golytely prep)    15. (Females) Are you currently pregnant?   If yes, how many weeks?      16. Are you taking any prescription pain medications on a routine schedule? N If yes, MAC sedation and patient will need EXTENDED PREP.    17. Do you have any chemical dependencies such as  alcohol, street drugs, or methadone? N If yes, MAC sedation     18. Do you have any history of post-traumatic stress syndrome, severe anxiety or history of psychosis? N  If yes, MAC sedation.     19. Do you transfer independently? Y    20.  Do you have any issues with constipation? Y   If yes, pt will need EXTENDED PREP     21. Preferred Pharmacy for Pre Prescription [id number] - [place], [place] - [address]    Scheduling Details    Which Colonoscopy Prep was Sent?: Extended   Type of Procedure Scheduled: Colonoscopy  Surgeon: Otto  Date of Procedure: Feb. 21  Location:   Caller (Please ask for phone number if not scheduled by patient): [facility]      Sedation Type: CS  Conscious Sedation- Needs  for 6 hours after the procedure  MAC/General-Needs  for 24 hours after procedure    Pre-op Required at [facility], [place], [place] and OR for MAC sedation: n  (if yes advise patient they will need a pre-op prior to procedure)      Informed patient they will need an adult  Y  Cannot take any type of public or medical transportation alone    Pre-Procedure Covid test to be completed at [facility] or Externally: [facility] on 2/18    Confirmed Nurse will call to complete assessment Y    Additional comments: none

## 2022-01-20 ENCOUNTER — ANCILLARY PROCEDURE (OUTPATIENT)
Dept: CT IMAGING | Facility: CLINIC | Age: 47
End: 2022-01-20
Attending: NURSE PRACTITIONER
Payer: COMMERCIAL

## 2022-01-20 DIAGNOSIS — R14.0 BLOATING: ICD-10-CM

## 2022-01-20 LAB — HCV AB SERPL QL IA: NONREACTIVE

## 2022-01-20 PROCEDURE — 74177 CT ABD & PELVIS W/CONTRAST: CPT | Mod: TC | Performed by: RADIOLOGY

## 2022-01-20 RX ORDER — IOPAMIDOL 755 MG/ML
100 INJECTION, SOLUTION INTRAVASCULAR ONCE
Status: COMPLETED | OUTPATIENT
Start: 2022-01-20 | End: 2022-01-20

## 2022-01-20 RX ADMIN — IOPAMIDOL 100 ML: 755 INJECTION, SOLUTION INTRAVASCULAR at 11:46

## 2022-01-20 NOTE — RESULT ENCOUNTER NOTE
Dear Shala,    Your recent test results are attached.      No Hepatitis C.    If you have any questions please feel free to contact [phone] or myself via [employer].    Sincerely,  Rita Mohr, CNP

## 2022-01-21 NOTE — RESULT ENCOUNTER NOTE
Dear Shala,    Your recent test results are attached.      Your CT scan does not show any acute findings.    If you have any questions please feel free to contact [phone] or myself via [employer].    Sincerely,  Rita Mohr, CNP

## 2022-02-04 DIAGNOSIS — Z11.59 ENCOUNTER FOR SCREENING FOR OTHER VIRAL DISEASES: Primary | ICD-10-CM

## 2022-02-05 ASSESSMENT — ENCOUNTER SYMPTOMS
NECK PAIN: 1
HEMATURIA: 0
JAUNDICE: 0
CONSTIPATION: 1
MUSCLE WEAKNESS: 0
FLANK PAIN: 0
RECTAL PAIN: 1
DEPRESSION: 1
NAUSEA: 0
BLOOD IN STOOL: 0
DECREASED CONCENTRATION: 1
BLOATING: 1
DECREASED LIBIDO: 1
DIARRHEA: 0
EYE IRRITATION: 1
HEARTBURN: 1
BACK PAIN: 1
JOINT SWELLING: 0
ABDOMINAL PAIN: 1
MYALGIAS: 0
INSOMNIA: 1
EYE REDNESS: 0
PANIC: 0
VOMITING: 0
HOT FLASHES: 0
EYE WATERING: 1
DOUBLE VISION: 0
NERVOUS/ANXIOUS: 1
MUSCLE CRAMPS: 0
ARTHRALGIAS: 1
DIFFICULTY URINATING: 0
EYE PAIN: 1
BOWEL INCONTINENCE: 0
STIFFNESS: 1
DYSURIA: 0

## 2022-02-05 ASSESSMENT — PATIENT HEALTH QUESTIONNAIRE - PHQ9
SUM OF ALL RESPONSES TO PHQ QUESTIONS 1-9: 11
10. IF YOU CHECKED OFF ANY PROBLEMS, HOW DIFFICULT HAVE THESE PROBLEMS MADE IT FOR YOU TO DO YOUR WORK, TAKE CARE OF THINGS AT HOME, OR GET ALONG WITH OTHER PEOPLE: SOMEWHAT DIFFICULT
SUM OF ALL RESPONSES TO PHQ QUESTIONS 1-9: 11

## 2022-02-06 ASSESSMENT — PATIENT HEALTH QUESTIONNAIRE - PHQ9: SUM OF ALL RESPONSES TO PHQ QUESTIONS 1-9: 11

## 2022-02-09 ENCOUNTER — OFFICE VISIT (OUTPATIENT)
Dept: ANESTHESIOLOGY | Facility: CLINIC | Age: 47
End: 2022-02-09
Payer: COMMERCIAL

## 2022-02-09 VITALS — OXYGEN SATURATION: 98 % | SYSTOLIC BLOOD PRESSURE: 135 MMHG | HEART RATE: 65 BPM | DIASTOLIC BLOOD PRESSURE: 87 MMHG

## 2022-02-09 DIAGNOSIS — R10.2 PELVIC PAIN IN FEMALE: ICD-10-CM

## 2022-02-09 DIAGNOSIS — G62.9 PERIPHERAL POLYNEUROPATHY: ICD-10-CM

## 2022-02-09 DIAGNOSIS — M54.16 LUMBAR RADICULOPATHY: ICD-10-CM

## 2022-02-09 PROCEDURE — 99204 OFFICE O/P NEW MOD 45 MIN: CPT | Mod: GC | Performed by: ANESTHESIOLOGY

## 2022-02-09 ASSESSMENT — PAIN SCALES - GENERAL: PAINLEVEL: MILD PAIN (3)

## 2022-02-09 NOTE — NURSING NOTE
Patient presents with:  Consult: [facility] NEW,RM 14, patient reports, 3/10 pain in their rectum      Mild Pain (3)         What medications are you using for pain? \  Gabpentin, cymbalta, valium    (New patients only) Have you been seen by another pain clinic/ provider? N/A        Akira Zhou, EMT

## 2022-02-09 NOTE — PATIENT INSTRUCTIONS
Referrals:    Pain psychology referral: [phone]      Treatment planning:    Contact the clinic if interested in pursuing the ganglion impar nerve block       Recommended Follow up:      For injection or as needed        To speak with a nurse, schedule/reschedule/cancel a clinic appointment, or request a medication refill call: [phone], option #1.    You can also reach us by [employer]: [email or web address]

## 2022-02-09 NOTE — LETTER
2/9/2022       RE: Shala Morales  [address]  [facility] [address]     Dear Colleague,    Thank you for referring your patient, Shala Morales, to the [facility]NA[facility] [place] at [facility]. Please see a copy of my visit note below.                          [facility] Consultation    Date of visit: 2/8/2022    Reason for consultation:    Shala Morales is a 46 year old female who is seen in consultation today at the request of her neurologist.    Primary Care Provider is Rita Mohr.  Pain medications are being prescribed by gabapentin, duloxetine.    Please see the [facility] health questionnaire which the patient completed and reviewed with me in detail.    Chief Complaint:    Pelvic pain    Pain history:  Shala Morales is a 46 year old female who first started having problems with pelvic pain in December 2020 with sudden onset pain during intercourse.  She has been having persistent pain ever since.  Pain is described as a burning sensation in her pelvis.  Pain started in her vaginal canal.  Then it migrated to her vulva.  Following which the pain subsequently migrated to her perennial body and recently the rectal region.  She denies anal pain.  She denies red flags including urinary and fecal incontinence.  Pain is made worse with penetrative sex and sitting.  Taking warm baths and pain medications do help with some pain relief.  Pain is at worst 8/10 at best 2/10 and on average 3/10. She does note overall some gradual improvement in symptoms since initial onset in 2020.     Patient has had an extensive work-up.  She has had lumbar and pelvic MRIs which are generally unremarkable.  She had an EMG which was also negative.  She has had abdominal ultrasounds which also did not show any obvious pathology.  Cymbalta and gabapentin are helping some with  the pain.  She has also tried pelvic floor PT which has not provided any help.  Acupuncture is providing some help.    Additionally, around the time the pain began patient had lost her job and her father passed away due to Covid.  Patient reports that her mood is sometimes depressed and she has been having some difficulty with her .  She reports the relationship was good prior to this issue.  She has never had children.    Pain rating: intensity ranges from 8/10 to 2/10, and Averages 3/10 on a 0-10 scale.  Aggravating factors include: penetrative sex, sitting  Relieving factors include: Cymbalta, baths, ice, none  Any bowel or bladder incontinence:     Current treatments include:  Estradiol, cymbalta 60 mg, Gabapentin 600 mg TID    Previous medication treatments included:  gabapentin, nortriptyline, amitriptyline, estrogen creams, diazepam suppository     Other treatments have included:  Shala Morales has not been seen at a pain clinic in the past.    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     Priority: Medium     Symptomatic menopausal or female climacteric states 04/17/2018     Priority: Medium     Biliary sludge determined by ultrasound 04/17/2018     Priority: Medium     History of hypertension 04/17/2018     Priority: Medium     History of depression 04/17/2018     Priority: Medium     History of alcoholism (H) 04/17/2018      Priority: Medium     Intermittent right upper quadrant abdominal pain 04/17/2018     Priority: Medium       Past Surgical History:  Past Surgical History:   Procedure Laterality Date     BIOPSY  4/2018    Cervical biopsy     Medications:  Current Outpatient Medications   Medication Sig Dispense Refill     crisaborole (EUCRISA) 2 % ointment Apply topically 2 times daily       diazepam 10 mg SUPP Place vaginally or rectally daily as needed. 10 suppository 3     docusate sodium (COLACE) 100 MG capsule Take 100 mg by mouth 2 times daily       DULoxetine (CYMBALTA) 60 MG capsule Take 1 capsule (60 mg) by mouth At Bedtime 90 capsule 3     estradiol (ESTRACE) 0.1 MG/GM vaginal cream APPLY SMALL AMOUNT TO AFFECTED AREA DAILY FOR 2 WEEKS       gabapentin (NEURONTIN) 600 MG tablet Take 1 tablet (600 mg) by mouth 3 times daily 120 tablet 8     ivermectin (SOOLANTRA) 1 % cream        lidocaine (XYLOCAINE) 2 % external gel APPLY A SMALL AMOUNT TO THE AFFECTED AREA TOPICALLY TWICE DAILY       polyethylene glycol (MIRALAX) 17 g packet Take 1 packet by mouth daily       sennosides (SENOKOT) 8.6 MG tablet Take 1 tablet by mouth daily       vitamin D3 (CHOLECALCIFEROL) 2000 units (50 mcg) tablet Take 1 tablet by mouth daily       Allergies:   No Known Allergies  Social History:  Home situation: lives with    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   Tobacco use: smoking 17 to 42yo   Alcohol use: in 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  Drug use: denies   History of chemical dependency treatment: denies     Family history:  Family History   Problem Relation Age of Onset     Thyroid Disease Mother      Hypertension Mother      Diabetes Father      Hypertension Father      Depression Father      Mental Illness Father      Obesity Father      Thyroid Disease Maternal Grandmother      Hypertension Maternal Grandmother      Hypertension Maternal Grandfather      Hypertension Paternal Grandmother      Diabetes Paternal Grandfather       Prostate Cancer Paternal Grandfather      Hypertension Paternal Grandfather      Family history of headaches:     Review of Systems:    POSITIVE IN BOLD  GENERAL: fever/chills, fatigue, general unwell feeling, weight gain/loss.  HEAD/EYES:  headache, dizziness, or vision changes.    EARS/NOSE/THROAT:  Nosebleeds, hearing loss, sinus infection, earache, tinnitus.  IMMUNE:  Allergies, cancer, immune deficiency, or infections.  SKIN:  Urticaria, rash, hives  HEME/Lymphatic:   anemia, easy bruising, easy bleeding.  RESPIRATORY:  cough, wheezing, or shortness of breath  CARDIOVASCULAR/Circulation:  Extremity edema, syncope, hypertension, tachycardia, or angina.  GASTROINTESTINAL:  abdominal pain, nausea/emesis, diarrhea, constipation,  hematochezia, or melena.  ENDOCRINE:  Diabetes, steroid use,  thyroid disease or osteoporosis.  MUSCULOSKELETAL: neck pain, back pain, arthralgia, arthritis, or gout. Hip Pain  GENITOURINARY:  frequency, urgency, dysuria, difficulty voiding, hematuria or incontinence.  NEUROLOGIC:  weakness, numbness, paresthesias, seizure, tremor, stroke or memory loss.  PSYCHIATRIC:  depression, anxiety, stress, suicidal thoughts or mood swings.     Physical Exam:  Vitals:    02/09/22 0706   BP: 135/87   Pulse: 65   SpO2: 98%     Exam:  Constitutional: healthy, alert and no distress  Head: normocephalic. Atraumatic.   Eyes: no redness or jaundice noted   ENT: oropharnx normal.  MMM.  Neck supple.    Cardiovascular: RRR no m/g/r   Respiratory: clear   Gastrointestinal: soft, non-tender, normoactive bowel sounds   : deferred  Skin: no suspicious lesions or rashes  Psychiatric: mentation appears normal and affect normal/bright    Diagnostic tests:    Lumbosacral plexus MR without and with contrast     History: Lumbosacral plexopathy, nontraumatic; Pelvic floor  dysfunction in female; Pelvic pain in female; Urinary urgency; Urinary  frequency.  Comparison: none     Technique: Axial T1-weighted,  T2-weighted, and paracoronal inversion  recovery images obtained through the lumbosacral plexus region without  intravenous contrast. Post intravenous contras using gadolinium axial  T1-weighted images with gadolinium and paracoronal T1-weighted with  fat-saturation were obtained.     Contrast: 7.5 cc Gadavist     Findings: No definite mass or abnormality in the region of the  lumbosacral plexus is noted. The visualized lower lumbar and sacral  spinal canal and neural foramina are patent. The marrow signal is  unremarkable.     Postcontrast images do not demonstrate any abnormal enhancement of the  paraspinous tissues, visualized vertebra, or within the visualized  portion of the thecal sac.                                                                      Impression: No lumbosacral plexus abnormality is identified.  MR LUMBAR SPINE W/O & W CONTRAST 5/1/2021 11:27 AM     Provided History: Lumbar radiculopathy.     Comparison: Lumbosacral plexus MRI dated 3/10/2021     ICD-10: Lumbar radiculopathy     Technique: Sagittal T1-weighted and T2-weighted and axial T2-weighted  images of the lumbar spine were obtained without intravenous contrast.  Post intravenous contrast using gadolinium axial and sagittal  T1-weighted images were obtained with fat saturation.     Contrast: 7.5 mL Gadavist     Findings: Regarding numbering convention, there are 5 lumbar-type  vertebrae counting down from C2.  The tip of the conus medullaris is  at L1.  Regarding alignment, the lumbar vertebral column is normally  aligned. Degenerative changes of lumbar spine with multilevel  osteophytic spurring, Schmorl's nodes formation at L1-2, and disc  space narrowing at L3-4, L4-5 and L5-S1.  Regarding bone marrow signal  intensity, no abnormality is visualized on STIR images.     On a level by level basis:     L1-2: No significant spinal canal or foraminal narrowing.     L2-3: No significant spinal canal or foraminal narrowing.     L3-4:  Circumferential disc bulge, slightly worsening on right with  bilateral lateral recess narrowing. Facet arthropathy bilaterally. No  significant spinal canal stenosis. Mild bilateral neuroforaminal  narrowing.     L4-5: Circumferential disc bulge and facet arthropathy bilaterally. No  significant spinal canal stenosis. Mild bilateral neural foraminal  narrowing.     L5-S1: Circumferential disc bulge and facet arthropathy bilaterally.  No significant spinal canal stenosis. Mild to moderate right, mild  left neuroforaminal narrowing.     Contrast-enhanced images demonstrate no abnormal enhancement in the  visualized paraspinous tissues anteriorly or in the spinal canal or  vertebra.                                                                      Impression:   Mild lumbar spondylosis, most prominent at L3-4, L4-5 and L5-S1. No  high-grade spinal canal or neuroforaminal narrowing at any level.     I have personally reviewed the examination and initial interpretation  and I agree with the findings.     JOSÉ MIGUEL GROSS MD      Personally reviewed imaging    Assessment:  1. Chronic Pelvic pain    Shala Morales is a 46 year old female who presents with the complaints of pelvic pain that has been present for approximately two years. The pain has gradually improved with neuropathic pain medications, and the symptoms have evolved from deep vaginal pain and left lower quadrant pain to perineal and  Rectal pain. We discussed treatment options, including a potential ganglion impar block to target the sympathetically mediated aspects of this pain.  Another option if the ganglion impar block is not effective is to consider a pudendal nerve block. However, due to negative EMG and other findings, I would recommend the ganglion impar block first. At this time she would like to hold off on scheduling this injection but will leave this as an option for the future.     Plan:  Diagnosis reviewed, treatment option addressed,  and risk/benefits discussed.  Self-care instructions given.  I am recommending a multidisciplinary treatment plan to help this patient better manage her pain.      1. Physical Therapy: Continue HEPs from Pelvic Floor therapy  2. Pain Psychologist to address issues of relaxation, behavioral change, coping style, and other factors important to improvement. Referral Placed  3. Diagnostic Studies: none  4. Medication Management: Continue current regimen as it has been beneficial  5. Further procedures recommended: Recommend ganglion of impar block orders have been placed patient to call to schedule.  6. Other treatments:none  7. Urine toxicology screen: n/a   8. Recommendations/follow-up for PCP:  none  9. Release of information: none  10. Follow up: as needed    Total time spent was 60 minutes, and more than 50% of face to face time was spent in counseling and/or coordination of care regarding principles of multidisciplinary care, medication management, and therapeutic options. This time also includes time for chart review, preparation, and documentation.     Joana Irving MD    Pain Medicine  Department of Anesthesiology  [facility]

## 2022-02-09 NOTE — NURSING NOTE
AVS given and reviewed with pt.  Pt verbalized understanding and declined any questions.     Nazia Bass DNP, RN

## 2022-02-10 RX ORDER — BISACODYL 5 MG
10 TABLET, DELAYED RELEASE (ENTERIC COATED) ORAL SEE ADMIN INSTRUCTIONS
Qty: 2 TABLET | Refills: 0 | Status: SHIPPED | OUTPATIENT
Start: 2022-02-10 | End: 2023-01-24

## 2022-02-18 ENCOUNTER — LAB (OUTPATIENT)
Dept: LAB | Facility: CLINIC | Age: 47
End: 2022-02-18
Payer: COMMERCIAL

## 2022-02-18 DIAGNOSIS — Z11.59 ENCOUNTER FOR SCREENING FOR OTHER VIRAL DISEASES: ICD-10-CM

## 2022-02-18 DIAGNOSIS — R10.13 EPIGASTRIC PAIN: ICD-10-CM

## 2022-02-18 LAB — SARS-COV-2 RNA RESP QL NAA+PROBE: NEGATIVE

## 2022-02-18 PROCEDURE — U0005 INFEC AGEN DETEC AMPLI PROBE: HCPCS

## 2022-02-18 PROCEDURE — U0003 INFECTIOUS AGENT DETECTION BY NUCLEIC ACID (DNA OR RNA); SEVERE ACUTE RESPIRATORY SYNDROME CORONAVIRUS 2 (SARS-COV-2) (CORONAVIRUS DISEASE [COVID-19]), AMPLIFIED PROBE TECHNIQUE, MAKING USE OF HIGH THROUGHPUT TECHNOLOGIES AS DESCRIBED BY CMS-2020-01-R: HCPCS

## 2022-02-21 ENCOUNTER — HOSPITAL ENCOUNTER (OUTPATIENT)
Facility: AMBULATORY SURGERY CENTER | Age: 47
Discharge: HOME | End: 2022-02-21
Attending: SURGERY | Admitting: SURGERY
Payer: COMMERCIAL

## 2022-02-21 VITALS
OXYGEN SATURATION: 97 % | HEART RATE: 68 BPM | RESPIRATION RATE: 16 BRPM | SYSTOLIC BLOOD PRESSURE: 116 MMHG | TEMPERATURE: 97.9 F | DIASTOLIC BLOOD PRESSURE: 76 MMHG

## 2022-02-21 DIAGNOSIS — Z12.11 SCREEN FOR COLON CANCER: Primary | ICD-10-CM

## 2022-02-21 LAB — COLONOSCOPY: NORMAL

## 2022-02-21 PROCEDURE — 99152 MOD SED SAME PHYS/QHP 5/>YRS: CPT | Mod: 59 | Performed by: SURGERY

## 2022-02-21 PROCEDURE — 88305 TISSUE EXAM BY PATHOLOGIST: CPT | Performed by: PATHOLOGY

## 2022-02-21 PROCEDURE — G8907 PT DOC NO EVENTS ON DISCHARG: HCPCS

## 2022-02-21 PROCEDURE — 45385 COLONOSCOPY W/LESION REMOVAL: CPT

## 2022-02-21 PROCEDURE — 45385 COLONOSCOPY W/LESION REMOVAL: CPT | Mod: PT | Performed by: SURGERY

## 2022-02-21 PROCEDURE — G8918 PT W/O PREOP ORDER IV AB PRO: HCPCS

## 2022-02-21 RX ORDER — LIDOCAINE 40 MG/G
CREAM TOPICAL
Status: DISCONTINUED | OUTPATIENT
Start: 2022-02-21 | End: 2022-02-22 | Stop reason: HOSPADM

## 2022-02-21 RX ORDER — NALOXONE HYDROCHLORIDE 0.4 MG/ML
0.2 INJECTION, SOLUTION INTRAMUSCULAR; INTRAVENOUS; SUBCUTANEOUS
Status: DISCONTINUED | OUTPATIENT
Start: 2022-02-21 | End: 2022-02-22 | Stop reason: HOSPADM

## 2022-02-21 RX ORDER — NALOXONE HYDROCHLORIDE 0.4 MG/ML
0.4 INJECTION, SOLUTION INTRAMUSCULAR; INTRAVENOUS; SUBCUTANEOUS
Status: DISCONTINUED | OUTPATIENT
Start: 2022-02-21 | End: 2022-02-22 | Stop reason: HOSPADM

## 2022-02-21 RX ORDER — ONDANSETRON 4 MG/1
4 TABLET, ORALLY DISINTEGRATING ORAL EVERY 6 HOURS PRN
Status: DISCONTINUED | OUTPATIENT
Start: 2022-02-21 | End: 2022-02-22 | Stop reason: HOSPADM

## 2022-02-21 RX ORDER — PROCHLORPERAZINE MALEATE 10 MG
10 TABLET ORAL EVERY 6 HOURS PRN
Status: DISCONTINUED | OUTPATIENT
Start: 2022-02-21 | End: 2022-02-22 | Stop reason: HOSPADM

## 2022-02-21 RX ORDER — FLUMAZENIL 0.1 MG/ML
0.2 INJECTION, SOLUTION INTRAVENOUS
Status: ACTIVE | OUTPATIENT
Start: 2022-02-21 | End: 2022-02-21

## 2022-02-21 RX ORDER — FENTANYL CITRATE 50 UG/ML
INJECTION, SOLUTION INTRAMUSCULAR; INTRAVENOUS PRN
Status: DISCONTINUED | OUTPATIENT
Start: 2022-02-21 | End: 2022-02-21 | Stop reason: HOSPADM

## 2022-02-21 RX ORDER — ONDANSETRON 2 MG/ML
4 INJECTION INTRAMUSCULAR; INTRAVENOUS EVERY 6 HOURS PRN
Status: DISCONTINUED | OUTPATIENT
Start: 2022-02-21 | End: 2022-02-22 | Stop reason: HOSPADM

## 2022-02-21 NOTE — H&P
ENDOSCOPY PRE-SEDATION H&P FOR OUTPATIENT PROCEDURES    [facility]  [id number]  1975    Procedure:   Colonoscopy possible biopsy, possible polypectomy, with moderate sedation.     Pre-procedure diagnosis: rectal pain , family history of colon cancer.  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     HTN (hypertension)      Motion sickness        Past surgical history:   Past Surgical History:   Procedure Laterality Date     BIOPSY  4/2018    Cervical biopsy       Current Outpatient Medications   Medication     bisacodyl (DULCOLAX) 5 MG EC tablet     crisaborole (EUCRISA) 2 % ointment     Cyanocobalamin (VITAMIN B 12 PO)     diazepam 10 mg SUPP     docusate sodium (COLACE) 100 MG capsule     DULoxetine (CYMBALTA) 60 MG capsule     estradiol (ESTRACE) 0.1 MG/GM vaginal cream     gabapentin (NEURONTIN) 600 MG tablet     ivermectin (SOOLANTRA) 1 % cream     lidocaine (XYLOCAINE) 2 % external gel     magnesium citrate solution     polyethylene glycol (GOLYTELY) 236 g suspension     polyethylene glycol (MIRALAX) 17 g packet     sennosides (SENOKOT) 8.6 MG tablet     vitamin D3 (CHOLECALCIFEROL) 2000 units (50 mcg) tablet     Current Facility-Administered Medications   Medication     lidocaine (LMX4) kit     lidocaine 1 % 0.1-1 mL     sodium chloride (PF) 0.9% PF flush 3 mL     sodium chloride (PF) 0.9% PF flush 3 mL     Facility-Administered Medications Ordered in Other Encounters   Medication     sodium chloride (PF) 0.9% PF flush 60 mL       No Known Allergies    History of Anesthesia/Sedation Problems: no    Physical Exam:    Mental status: alert  Heart: Normal  Lung: Normal  Assessment of patient's airway: Normal  Other as pertinent for procedure: None     ASA Score: See Provation note    Mallampati score:  II -  Faucial pillars and soft palate may be seen, but uvula is masked by the base of the tongue    Assessment/Plan:     The patient is an appropriate candidate to receive sedation.    Informed consent was discussed with the patient/family, including the risks, benefits, potential complications and any alternative options associated with sedation.    Patient assessment completed just prior to sedation and while under constant observation by the provider. Condition determined to be adequate for proceeding with sedation.    The specific risks for the procedure were discussed with the patient at the time of informed consent and include but are not limited to perforation which could require surgery, missing significant neoplasm or lesion, hemorrhage and adverse sedative complication.      Samson Menjivar MD

## 2022-02-22 ENCOUNTER — TELEPHONE (OUTPATIENT)
Dept: SURGERY | Facility: CLINIC | Age: 47
End: 2022-02-22
Payer: COMMERCIAL

## 2022-02-22 NOTE — TELEPHONE ENCOUNTER
Reason for Call:  Question on using valium rectal suppository     Detailed comments: Patient had colonoscopy done yesterday, call to advise. Thank you.    Phone Number Patient can be reached at: Home number on file [phone] (home)    Best Time: any    Can we leave a detailed message on this number? YES    Call taken on 2/22/2022 at 11:55 AM by Lacey Ellison

## 2022-02-22 NOTE — TELEPHONE ENCOUNTER
Patient reports she uses valium rectal suppositories for pelvic pain and is wondering if there is any issue with using it considering she is post colonoscopy with polyp removal. Forwarded message to Dr. Menjivar to advise.    Shay THURMAN RN....2/22/2022 1:30 PM

## 2022-02-22 NOTE — TELEPHONE ENCOUNTER
Per routing comment from Dr. Menjivar:    No problem to do rectal suppositories.     Samson Menjivar MD

## 2022-02-22 NOTE — TELEPHONE ENCOUNTER
Called and informed patient that it is okay to use the suppository. Patient verbalized understanding and has no other questions.    Shay THURMAN RN....2/22/2022 2:03 PM

## 2022-02-23 LAB
PATH REPORT.COMMENTS IMP SPEC: NORMAL
PATH REPORT.COMMENTS IMP SPEC: NORMAL
PATH REPORT.FINAL DX SPEC: NORMAL
PATH REPORT.GROSS SPEC: NORMAL
PATH REPORT.MICROSCOPIC SPEC OTHER STN: NORMAL
PATH REPORT.RELEVANT HX SPEC: NORMAL
PHOTO IMAGE: NORMAL

## 2022-03-02 ENCOUNTER — LAB REQUISITION (OUTPATIENT)
Dept: LAB | Facility: CLINIC | Age: 47
End: 2022-03-02

## 2022-03-02 PROCEDURE — 86481 TB AG RESPONSE T-CELL SUSP: CPT | Performed by: INTERNAL MEDICINE

## 2022-03-02 PROCEDURE — 86787 VARICELLA-ZOSTER ANTIBODY: CPT | Performed by: INTERNAL MEDICINE

## 2022-03-02 PROCEDURE — 86765 RUBEOLA ANTIBODY: CPT | Performed by: INTERNAL MEDICINE

## 2022-03-02 PROCEDURE — 86706 HEP B SURFACE ANTIBODY: CPT | Performed by: INTERNAL MEDICINE

## 2022-03-02 PROCEDURE — 86762 RUBELLA ANTIBODY: CPT | Performed by: INTERNAL MEDICINE

## 2022-03-02 PROCEDURE — 86735 MUMPS ANTIBODY: CPT | Performed by: INTERNAL MEDICINE

## 2022-03-03 LAB
HBV SURFACE AB SERPL IA-ACNC: 0.81 M[IU]/ML
MEV IGG SER IA-ACNC: >300 AU/ML
MEV IGG SER IA-ACNC: POSITIVE
MUMPS ANTIBODY IGG INSTRUMENT VALUE: <5 AU/ML
MUV IGG SER QL IA: NORMAL
RUBV IGG SERPL QL IA: 5.93 INDEX
RUBV IGG SERPL QL IA: POSITIVE
VZV IGG SER QL IA: 2174 INDEX
VZV IGG SER QL IA: POSITIVE

## 2022-03-04 LAB
GAMMA INTERFERON BACKGROUND BLD IA-ACNC: 0.05 IU/ML
M TB IFN-G BLD-IMP: NEGATIVE
M TB IFN-G CD4+ BCKGRND COR BLD-ACNC: 9.95 IU/ML
MITOGEN IGNF BCKGRD COR BLD-ACNC: 0.01 IU/ML
MITOGEN IGNF BCKGRD COR BLD-ACNC: 0.02 IU/ML
QUANTIFERON MITOGEN: 10 IU/ML
QUANTIFERON NIL TUBE: 0.05 IU/ML
QUANTIFERON TB1 TUBE: 0.06 IU/ML
QUANTIFERON TB2 TUBE: 0.07

## 2022-03-07 PROBLEM — M25.552 HIP PAIN, LEFT: Status: RESOLVED | Noted: 2021-09-13 | Resolved: 2022-03-07

## 2022-03-07 NOTE — PROGRESS NOTES
Discharge Note    Progress reporting period is from initial evaluation date (please see noted date below) to Oct 19, 2021.  Linked Episodes   Type: Episode: Status: Noted: Resolved: Last update: Updated by:   PHYSICAL THERAPY L hip pain 9-13-21 Active 9/13/2021  10/19/2021  8:37 AM Gwen Lopez, PT      Comments:       Shala wanted to continue with her HEP and see how she does managing her symptoms on her own for now  Please see information below for last relevant information on current status.  Patient seen for 4 visits.    SUBJECTIVE  Subjective changes noted by patient:  The following is from last visit seen-States the bladder pain increase she had last time subsided after about 5 days. L groin pain overall better waxes and wanes, can tell if she walks too much.  Sitting on hard chair-better, but tries not to spend more than 5 min otherwise will use cushion and can sit with that one hour.  Wants next appt about 1 month out  Consistent with HEP  .  Current pain level is 2/10.     Previous pain level was  2/10 (increases to 6-7/10).   Changes in function:  Yes (See Goal flowsheet attached for changes in current functional level)  Adverse reaction to treatment or activity: None    OBJECTIVE  Changes noted in objective findings: The following is from last visit seen-Sitting equal weight on buttocks. Strength L hip abd 5/5, ext 5-/5, ER 4/5  Hip flx L wnl with slight pinch at end range. Hip IR discomfort at end range and still mod limited.  Added OI ex, no wt. Changed SLR ext to squats.      ASSESSMENT/PLAN  Diagnosis: L hip/groin pain   Updated problem list and treatment plan:   Continue with HEP, see MD as needed  STG/LTGs have been met or progress has been made towards goals:  Yes, please see goal flowsheet for most current information  Assessment of Progress: current status is unknown.    Last current status: Pt is progressing slower than anticipated   Self Management Plans:  HEP  I have re-evaluated this  patient and find that the nature, scope, duration and intensity of the therapy is appropriate for the medical condition of the patient.  Shala continues to require the following intervention to meet STG and LTG's:  HEP.    Recommendations:  Discharge with current home program.  Patient to follow up with MD as needed.    Please refer to the daily flowsheet for treatment today, total treatment time and time spent performing 1:1 timed codes.

## 2022-06-20 ENCOUNTER — LAB REQUISITION (OUTPATIENT)
Dept: LAB | Facility: CLINIC | Age: 47
End: 2022-06-20

## 2022-06-20 LAB — HBV SURFACE AB SERPL IA-ACNC: >1000 M[IU]/ML

## 2022-06-20 PROCEDURE — 86706 HEP B SURFACE ANTIBODY: CPT | Performed by: INTERNAL MEDICINE

## 2022-06-21 ENCOUNTER — VIRTUAL VISIT (OUTPATIENT)
Dept: NEUROLOGY | Facility: CLINIC | Age: 47
End: 2022-06-21

## 2022-06-21 ENCOUNTER — PRE VISIT (OUTPATIENT)
Dept: GASTROENTEROLOGY | Facility: CLINIC | Age: 47
End: 2022-06-21

## 2022-06-21 DIAGNOSIS — M54.16 LUMBAR RADICULOPATHY: ICD-10-CM

## 2022-06-21 DIAGNOSIS — G62.9 PERIPHERAL POLYNEUROPATHY: ICD-10-CM

## 2022-06-21 PROCEDURE — 99214 OFFICE O/P EST MOD 30 MIN: CPT | Mod: GT | Performed by: PSYCHIATRY & NEUROLOGY

## 2022-06-21 RX ORDER — DULOXETIN HYDROCHLORIDE 60 MG/1
60 CAPSULE, DELAYED RELEASE ORAL AT BEDTIME
Qty: 90 CAPSULE | Refills: 3 | Status: SHIPPED | OUTPATIENT
Start: 2022-06-21 | End: 2023-08-08

## 2022-06-21 RX ORDER — AZELAIC ACID 0.15 G/G
1 GEL TOPICAL 2 TIMES DAILY
COMMUNITY
Start: 2022-05-31

## 2022-06-21 RX ORDER — GABAPENTIN 600 MG/1
600 TABLET ORAL 2 TIMES DAILY
Qty: 120 TABLET | Refills: 5 | Status: SHIPPED | OUTPATIENT
Start: 2022-06-21 | End: 2023-01-24

## 2022-06-21 NOTE — PROGRESS NOTES
[facility] Neurology Follow Up Visit    Shala Morales MRN# [id number]   Age: 47 year old YOB: 1975     Brief history of symptoms: The patient was initially seen in neurologic consultation on 4/9/2021 and most recently 6/10/2021 for evaluation of peripheral pelvic nerve pain. Please see the comprehensive neurologic consultation notes from those dates in the Epic records for details.     Overall, investigation with MRI lumbar spine on 5/1/2021 was revealing for only mild lumbar spondylosis at L3-4, L4-5 and L5-S1 due to mild and circumferential disc bulges. EMG 7/13/2021 was normal.  B6/B12/Vitamin D were all normal on 4/9/2021.  Further testing (6/15/2021) with SSA, SSB, LGI1 Igg, CASPR2 Igg, NANCY, RH, endomysial, Lyme, SPEP, Immunofixation, CRP, ESR, ANCA, paraneoplastic panel was negative.  She was referred to OBGYN and pain clinic to consider ganglion block versus other treatment (already tried gabapentin/nortritpyline/amitriptyline, PT for pain) for deep vaginal pain.    Interval history: The patient has had a great improvement in her symptoms, but doesn't exactly know why.  Her pain is still there but just minimal.  She never had a ganglion block. She did cut back on gabapentin to now just using it at 600 mg at bedtime with Cymbalta 60 mg at bedtime.  She still has occasional numbness go down her right leg (lateral surface to the foot).       Physical Exam:   General: Seated comfortably in no acute distress.  Neurologic:     Mental Status: Fully alert, attentive and oriented.     Cranial Nerves: EOMI with normal smooth pursuit.     Motor: No tremors or other abnormal movements observed.          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  To get the deep vaginal pain she describes, I suspect she  would have to have injury to S2-4 involvement.  The curious issue here is that she also still describes left leg sensory changes consistent with L5-S1 dermatomes, so if her symptoms are combined, it would appear that her radicular issues would have to be relatively wide-spread (L5, S1, S2-4 pelvic splanchnic visceral afferent involvement).  Given the EMG was negative, but the imaging was still suggestive for mild disc bulges at these levels, I would have to think that her clinical picture as well as imaging is supportive enough to indicate she has a sensory radicular process involving multiple levels and is likely chronic at this point, especially given some of her response to neuropathic agents.  I would still want her to be seen with an OBGYN to make sure no other injuries beyond nerve are leading to her pain.    Plan:  -  Continue with Duloxetine 60 mg at bedtime, and Gabapentin 600 mg BID for the next 3-4 months and then consider weaning off of one medication if symptoms remain stable.    Follow up in Neurology clinic in 4 months, or earlier as needed should new concerns arise.    DONATO Zhou D.O.   of Neurology    Total time today (30 min) in this patient encounter was spent on pre-charting, counseling and/or coordination of care.     Video start time was 0702  Video end time was 0727

## 2022-06-21 NOTE — LETTER
6/21/2022       RE: Shala Morales  [address]  [place] [address]     Dear Colleague,    Thank you for referring your patient, Shala Morales, to the [facility] [place] at [facility]. Please see a copy of my visit note below.    [facility] Neurology Follow Up Visit    Shala Morales MRN# [id number]   Age: 47 year old YOB: 1975     Brief history of symptoms: The patient was initially seen in neurologic consultation on 4/9/2021 and most recently 6/10/2021 for evaluation of peripheral pelvic nerve pain. Please see the comprehensive neurologic consultation notes from those dates in the Epic records for details.     Overall, investigation with MRI lumbar spine on 5/1/2021 was revealing for only mild lumbar spondylosis at L3-4, L4-5 and L5-S1 due to mild and circumferential disc bulges. EMG 7/13/2021 was normal.  B6/B12/Vitamin D were all normal on 4/9/2021.  Further testing (6/15/2021) with SSA, SSB, LGI1 Igg, CASPR2 Igg, NANCY, RH, endomysial, Lyme, SPEP, Immunofixation, CRP, ESR, ANCA, paraneoplastic panel was negative.  She was referred to OBGYN and pain clinic to consider ganglion block versus other treatment (already tried gabapentin/nortritpyline/amitriptyline, PT for pain) for deep vaginal pain.    Interval history: The patient has had a great improvement in her symptoms, but doesn't exactly know why.  Her pain is still there but just minimal.  She never had a ganglion block. She did cut back on gabapentin to now just using it at 600 mg at bedtime with Cymbalta 60 mg at bedtime.  She still has occasional numbness go down her right leg (lateral surface to the foot).       Physical Exam:   General: Seated comfortably in no acute distress.  Neurologic:     Mental Status: Fully alert, attentive and oriented.     Cranial Nerves: EOMI with normal smooth pursuit.     Motor: No tremors or other abnormal movements  observed.          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  To get the deep vaginal pain she describes, I suspect she would have to have injury to S2-4 involvement.  The curious issue here is that she also still describes left leg sensory changes consistent with L5-S1 dermatomes, so if her symptoms are combined, it would appear that her radicular issues would have to be relatively wide-spread (L5, S1, S2-4 pelvic splanchnic visceral afferent involvement).  Given the EMG was negative, but the imaging was still suggestive for mild disc bulges at these levels, I would have to think that her clinical picture as well as imaging is supportive enough to indicate she has a sensory radicular process involving multiple levels and is likely chronic at this point, especially given some of her response to neuropathic agents.  I would still want her to be seen with an OBGYN to make sure no other injuries beyond nerve are leading to her pain.    Plan:  -  Continue with Duloxetine 60 mg at bedtime, and Gabapentin 600 mg BID for the next 3-4 months and then consider weaning off of one medication if symptoms remain stable.    Follow up in Neurology clinic in 4 months, or earlier as needed should new concerns arise.      DONATO Zhou D.O.   of Neurology      Total time today (30 min) in this patient encounter was spent on pre-charting, counseling and/or coordination of care.     Video start time was 0702  Video end time was 0727

## 2022-07-25 NOTE — TELEPHONE ENCOUNTER
"REFERRAL INFORMATION:    Referring Provider:  MARCIE Max CNP     Referring Clinic:  [facility] [place]     Reason for Visit/Diagnosis: Constipation      FUTURE VISIT INFORMATION:    Appointment Date: 8/30/2022    Appointment Time: 2 PM      NOTES STATUS DETAILS   OFFICE NOTE from Referring Provider Internal 1/19/2022, 3/29/2021 Office visit with MARCIE Max CNP      OFFICE NOTE from Other Specialist Received 10/4/18 Office visit with Dr. Rian Herman ([facility])    HOSPITAL DISCHARGE SUMMARY/  ED VISITS N/A    OPERATIVE REPORT N/A    MEDICATION LIST Internal         ENDOSCOPY  N/A    COLONOSCOPY Internal 2/21/2022   ERCP N/A    EUS Care Everywhere 12/11/18 ([facility])    STOOL TESTING N/A    PERTINENT LABS Internal/ Care Everywhere    PATHOLOGY REPORTS (RELATED) Internal 2/21/2022   IMAGING (CT, MRI, EGD, MRCP, Small Bowel Follow Through/SBT, MR/CT Enterography) Internal CT Abdomen Pelvis: 1/20/2022     Action 7.12.22 MJ   Action Taken Sent request to [facility] for endoscopy     8/3/2022 2:58pm Fax request sent to [facility] for med recs. -Bhao     8/29/2022 3:38pm Urgent fax request sent to [facility]. -Bhao     8/29/2022 3:55pm Received recs from [facility]; sent to scan. A copy was placed in MD\"s folder in Right Fax. -Tim         "
Quality 130: Documentation Of Current Medications In The Medical Record: Current Medications Documented
Quality 226: Preventive Care And Screening: Tobacco Use: Screening And Cessation Intervention: Patient screened for tobacco use, is a smoker AND received Cessation Counseling
Detail Level: Detailed
Quality 110: Preventive Care And Screening: Influenza Immunization: Influenza Immunization Ordered or Recommended, but not Administered due to system reason

## 2022-08-08 ENCOUNTER — ANCILLARY PROCEDURE (OUTPATIENT)
Dept: MAMMOGRAPHY | Facility: CLINIC | Age: 47
End: 2022-08-08
Attending: NURSE PRACTITIONER
Payer: COMMERCIAL

## 2022-08-08 DIAGNOSIS — Z12.31 VISIT FOR SCREENING MAMMOGRAM: ICD-10-CM

## 2022-08-08 PROCEDURE — 77067 SCR MAMMO BI INCL CAD: CPT | Mod: TC | Performed by: RADIOLOGY

## 2022-08-08 PROCEDURE — 77063 BREAST TOMOSYNTHESIS BI: CPT | Mod: TC | Performed by: RADIOLOGY

## 2022-08-22 ENCOUNTER — VIRTUAL VISIT (OUTPATIENT)
Dept: PSYCHOLOGY | Facility: CLINIC | Age: 47
End: 2022-08-22
Payer: COMMERCIAL

## 2022-08-22 DIAGNOSIS — F32.89 MINOR DEPRESSIVE DISORDER: Primary | ICD-10-CM

## 2022-08-22 PROCEDURE — 90834 PSYTX W PT 45 MINUTES: CPT | Mod: GT | Performed by: PSYCHOLOGIST

## 2022-08-25 NOTE — PROGRESS NOTES
"[facility]   Mental Health & Addiction Services     Progress Note - Initial Visit    Patient  Name:  Shala Morales Date: 22         Service Type: Individual     Visit Start Time: 11:03am Visit End Time: 11:55am    Visit #: 1 52 minutes    Attendees: Client attended alone    Service Modality:  Video Visit:      Provider verified identity through the following two step process.  Patient provided:  Patient photo and Patient     Telemedicine Visit: The patient's condition can be safely assessed and treated via synchronous audio and visual telemedicine encounter.      Reason for Telemedicine Visit: Services only offered telehealth    Originating Site (Patient Location): Patient's home    Distant Site (Provider Location): Provider Remote Setting- Home Office    Consent:  The patient/guardian has verbally consented to: the potential risks and benefits of telemedicine (video visit) versus in person care; bill my insurance or make self-payment for services provided; and responsibility for payment of non-covered services.     Patient would like the video invitation sent by:  Send to e-mail at: [email or web address]    Mode of Communication:  Video Conference via [facility]    As the provider I attest to compliance with applicable laws and regulations related to telemedicine.       DATA:   Interactive Complexity: No   Crisis: No     Presenting Concerns/  Current Stressors:   \"It mainly revolved around chronic pain at first. Sometimes I have a hard time staying afloat.  Sometimes I just, maybe my coping mechanisms aren't so good.  I get dragged down in negative thinking and can't see the positive.\"      ASSESSMENT:  Mental Status Assessment:  Appearance:   Appropriate   Eye Contact:   Good   Psychomotor Behavior: Normal   Attitude:   Cooperative  Friendly Pleasant  Orientation:   All  Speech   Rate / Production: Normal/ Responsive   Volume:  Normal   Mood:    Normal  Affect:    Appropriate " "  Thought Content:  Clear   Thought Form:  Coherent   Insight:    Good  and Fair       Safety Issues and Plan for Safety and Risk Management:     [place] Suicide Severity Rating Scale (Lifetime/Recent)  [place] Suicide Severity Rating (Lifetime/Recent) 8/22/2022   1. Wish to be Dead (Lifetime) 1   Wish to be Dead Description (Lifetime) MRE: 10+ yrs   2. Non-Specific Active Suicidal Thoughts (Lifetime) 1   Non-Specific Active Suicidal Thought Description (Lifetime) MRE: \"Probably in the midst of my alcoholism, 10+ yrs ago.\"   2. Non-Specific Active Suicidal Thoughts (Past 1 Month) 0   3. Active Suicidal Ideation with any Methods (Not Plan) Without Intent to Act (Lifetime) 1   Active Suicidal Ideation with any Methods (Not Plan) Description (Lifetime) \"I was thinking of maybe an overdose or something.  I don't think it would have worked.\"   3. Active Suicidal Ideation with any Methods (Not Plan) Without Intent to Act (Past 1 Month) 0   4. Active Suicidal Ideation with Some Intent to Act, Without Specific Plan (Lifetime) 0   5. Active Suicidal Ideation with Specific Plan and Intent (Lifetime) 0   Most Severe Ideation Rating (Past 1 Month) 1   Description of Most Severe Ideation (Past 1 Month) MRE: \"Probably in the midst of my alcoholism, 10+ yrs ago.\"   Frequency (Lifetime) 1   Duration (Lifetime) 2   Controllability (Lifetime) 3   Deterrents (Lifetime) 1   Reasons for Ideation (Lifetime) 4   Reasons for Ideation (Past 1 Month) 0   Actual Attempt (Lifetime) 0   Has subject engaged in non-suicidal self-injurious behavior? (Lifetime) 0   Has subject engaged in non-suicidal self-injurious behavior? (Past 3 Months) 0   Interrupted Attempts (Lifetime) 0   Aborted or Self-Interrupted Attempt (Lifetime) 0   Preparatory Acts or Behavior (Lifetime) 0   Calculated C-SSRS Risk Score (Lifetime/Recent) No Risk Indicated     Patient denies current fears or concerns for personal safety.  Patient denies current or recent suicidal " "ideation or behaviors.  Patient denies current or recent homicidal ideation or behaviors.  Patient denies current or recent self injurious behavior or ideation.  Patient denies other safety concerns.  Recommended that patient call 911 or go to the local ED should there be a change in any of these risk factors.  Patient reports there are firearms in the house. The firearms are secured in a locked space.     Diagnostic Criteria:  Unspecified Depressive Disorder (Provisional)   Feeling down, loses interest in once enjoyable activities, increase in negative thinking    DSM5 Diagnoses: (Sustained by DSM5 Criteria Listed Above)  Diagnoses: 311 (F32.9) Unspecified Depressive Disorder   Psychosocial & Contextual Factors: Client has chronic pain currently managed by medication; \"I have few friends\"    Assessments completed prior to visit:  The following assessments were completed by patient for this visit:  PHQ9:   PHQ-9 SCORE 1/18/2022 2/5/2022 8/22/2022   PHQ-9 Total Score MyChart 11 (Moderate depression) 11 (Moderate depression) -   PHQ-9 Total Score 11 11 9     GAD7:   JOAO-7 SCORE 8/22/2022   Total Score 4     CAGE-AID:   CAGE-AID Total Score 8/21/2022   Total Score 0   Total Score MyChart 0 (A total score of 2 or greater is considered clinically significant)     PROMIS 10-Global Health (all questions and answers displayed):   PROMIS 10 8/21/2022   In general, would you say your health is: Fair   In general, would you say your quality of life is: Good   In general, how would you rate your physical health? Fair   In general, how would you rate your mental health, including your mood and your ability to think? Good   In general, how would you rate your satisfaction with your social activities and relationships? Good   In general, please rate how well you carry out your usual social activities and roles Good   To what extent are you able to carry out your everyday physical activities such as walking, climbing stairs, " "carrying groceries, or moving a chair? Completely   How often have you been bothered by emotional problems such as feeling anxious, depressed or irritable? Often   How would you rate your fatigue on average? Mild   How would you rate your pain on average?   0 = No Pain  to  10 = Worst Imaginable Pain 4   In general, would you say your health is: 2   In general, would you say your quality of life is: 3   In general, how would you rate your physical health? 2   In general, how would you rate your mental health, including your mood and your ability to think? 3   In general, how would you rate your satisfaction with your social activities and relationships? 3   In general, please rate how well you carry out your usual social activities and roles. (This includes activities at home, at work and in your community, and responsibilities as a parent, child, spouse, employee, friend, etc.) 3   To what extent are you able to carry out your everyday physical activities such as walking, climbing stairs, carrying groceries, or moving a chair? 5   In the past 7 days, how often have you been bothered by emotional problems such as feeling anxious, depressed, or irritable? 4   In the past 7 days, how would you rate your fatigue on average? 2   In the past 7 days, how would you rate your pain on average, where 0 means no pain, and 10 means worst imaginable pain? 4   Global Mental Health Score 11   Global Physical Health Score 14   PROMIS TOTAL - SUBSCORES 25   Some recent data might be hidden     [place] Suicide Severity Rating Scale (Lifetime/Recent)  [place] Suicide Severity Rating (Lifetime/Recent) 8/22/2022   1. Wish to be Dead (Lifetime) 1   Wish to be Dead Description (Lifetime) MRE: 10+ yrs   2. Non-Specific Active Suicidal Thoughts (Lifetime) 1   Non-Specific Active Suicidal Thought Description (Lifetime) MRE: \"Probably in the midst of my alcoholism, 10+ yrs ago.\"   2. Non-Specific Active Suicidal Thoughts (Past 1 Month) 0 " "  3. Active Suicidal Ideation with any Methods (Not Plan) Without Intent to Act (Lifetime) 1   Active Suicidal Ideation with any Methods (Not Plan) Description (Lifetime) \"I was thinking of maybe an overdose or something.  I don't think it would have worked.\"   3. Active Suicidal Ideation with any Methods (Not Plan) Without Intent to Act (Past 1 Month) 0   4. Active Suicidal Ideation with Some Intent to Act, Without Specific Plan (Lifetime) 0   5. Active Suicidal Ideation with Specific Plan and Intent (Lifetime) 0   Most Severe Ideation Rating (Past 1 Month) 1   Description of Most Severe Ideation (Past 1 Month) MRE: \"Probably in the midst of my alcoholism, 10+ yrs ago.\"   Frequency (Lifetime) 1   Duration (Lifetime) 2   Controllability (Lifetime) 3   Deterrents (Lifetime) 1   Reasons for Ideation (Lifetime) 4   Reasons for Ideation (Past 1 Month) 0   Actual Attempt (Lifetime) 0   Has subject engaged in non-suicidal self-injurious behavior? (Lifetime) 0   Has subject engaged in non-suicidal self-injurious behavior? (Past 3 Months) 0   Interrupted Attempts (Lifetime) 0   Aborted or Self-Interrupted Attempt (Lifetime) 0   Preparatory Acts or Behavior (Lifetime) 0   Calculated C-SSRS Risk Score (Lifetime/Recent) No Risk Indicated         WHODAS 2.0 (12 item):   WHODAS 2.0 Total Score 8/21/2022   Total Score 17   Total Score MyChart 17     Intervention:   Psychoeducation; Skill review/identification & recommendation; Pattern recognition; Socratic Questioning; Active listening, validation, and other rapport building skills; Administer and explain screeners; Assigned homework  Collateral Reports Completed:  Not Applicable      PLAN: (Homework, other):  1. Provider will continue Diagnostic Assessment.  Patient was given the following to do until next session:  Client will start to clarify her therapy goals and work on managing depression.    2. Provider recommended the following referrals: None at this time.      3.  " Suicide Risk and Safety Concerns were assessed for Shala Morales.    Patient meets the following risk assessment and triage: Patient denied any current/recent/lifetime history of suicidal ideation and/or behaviors.  No safety plan indicated at this time.       Rubén Odom  August 25, 2022

## 2022-08-29 ENCOUNTER — VIRTUAL VISIT (OUTPATIENT)
Dept: PSYCHOLOGY | Facility: CLINIC | Age: 47
End: 2022-08-29
Payer: COMMERCIAL

## 2022-08-29 DIAGNOSIS — F32.89 MINOR DEPRESSIVE DISORDER: Primary | ICD-10-CM

## 2022-08-29 PROCEDURE — 90791 PSYCH DIAGNOSTIC EVALUATION: CPT | Mod: GT | Performed by: PSYCHOLOGIST
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
## 2022-08-30 ENCOUNTER — VIRTUAL VISIT (OUTPATIENT)
Dept: GASTROENTEROLOGY | Facility: CLINIC | Age: 47
End: 2022-08-30
Attending: NURSE PRACTITIONER
Payer: COMMERCIAL

## 2022-08-30 DIAGNOSIS — R10.13 EPIGASTRIC PAIN: Primary | ICD-10-CM

## 2022-08-30 DIAGNOSIS — K59.00 CONSTIPATION, UNSPECIFIED CONSTIPATION TYPE: ICD-10-CM

## 2022-08-30 PROCEDURE — 99205 OFFICE O/P NEW HI 60 MIN: CPT | Mod: GT | Performed by: PHYSICIAN ASSISTANT

## 2022-08-30 RX ORDER — GABAPENTIN 600 MG/1
TABLET ORAL
COMMUNITY
Start: 2022-04-01 | End: 2023-01-24

## 2022-08-30 NOTE — PROGRESS NOTES
"Shala is a 47 year old who is being evaluated via a billable video visit.      How would you like to obtain your AVS? MyChart  If the video visit is dropped, the invitation should be resent by: Text to cell phone: [phone]  Will anyone else be joining your video visit? No      Video-Visit Details    Video Start Time: 150p    Type of service:  Video Visit    Video End Time:2:36 PM    Originating Location (pt. Location): Home    Distant Location (provider location):  [facility] [place]     Platform used for Video Visit: [employer]     GI CLINIC VISIT    HPI: 47 year old female with PMH of chronic pain, depression, GERD presenting to GI clinic for chronic abdominal pain and constipation    For several years, Shala has experienced episodic epigastric pain. Initially the pain was less frequent, but has become more frequent over the last few years, occurring around once per month and lasting from 2-7 days. Pain feels like \"stomach starts to eat itself\". The pain can radiate to the RUQ and back and can be accompanied by nausea. In 2018, she underwent RUQ US, CT AP, and EGD with EUS, all of which was unrevealing with the exception of moderate stool burden. The pain can worsen with constipation, but otherwise has not noted any clear triggers.    Shala has had chronic constipation with irregular bowel habits at a young age. She describes bowel movements around three times per week despite daily Senna, Magnesium, and PRN use of MOM. Dominant stool consistency is dry and hard. She has sense of incomplete evacuation. She tries not to strain and uses a squatty potty. Also doing acupuncture and working with an herbalist. GI ROS otherwise notable for abdominal bloating, subjective distention, and postprandial fullness. Weight gain. She had history of globus in the past felt to possibly relate to GERD so uses a wedge pillow.    Colonoscopy 2/2022 with three 2 to 6 mm polyps; one TA, " "remainder hyperplastic.    B - cereal  L - cucumber or peanut butter and pretzels  D salad + fake meat  No artificial sweeteners    Fhx - mom with IBS and SIBO      ROS: 10pt ROS performed and otherwise negative.    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     HTN (hypertension)      Motion sickness        PREVIOUS ABDOMINAL/GYNECOLOGIC SURGERIES:  Past Surgical History:   Procedure Laterality Date     BIOPSY  4/2018    Cervical biopsy     COLONOSCOPY N/A 2/21/2022    Procedure: COLONOSCOPY, FLEXIBLE, WITH LESION REMOVAL USING SNARE;  Surgeon: Samson Menjivar MD;  Location: [facility] OR     COLONOSCOPY WITH CO2 INSUFFLATION N/A 2/21/2022    Procedure: COLONOSCOPY, WITH CO2 INSUFFLATION;  Surgeon: Samson Menjivar MD;  Location: [facility] OR         PERTINENT MEDICATIONS:  Current Outpatient Medications   Medication Sig Dispense Refill     azelaic acid (FINACIA) 15 % external gel Apply 1 g topically 2 times daily       bisacodyl (DULCOLAX) 5 MG EC tablet Take 2 tablets (10 mg) by mouth See Admin Instructions Refer to \"Two-Day Golytely (Colyte, Nulytely)\" Instructions for your Colonoscopy handout. 2 tablet 0     crisaborole (EUCRISA) 2 % ointment Apply topically 2 times daily       Cyanocobalamin (VITAMIN B 12 PO)        diazepam 10 mg SUPP Place vaginally or rectally daily as needed. 10 suppository 3     docusate sodium (COLACE) 100 MG capsule Take 100 mg by mouth 2 times daily       DULoxetine (CYMBALTA) 60 MG capsule Take 1 capsule (60 mg) by mouth At Bedtime 90 capsule 3     estradiol (ESTRACE) 0.1 MG/GM vaginal cream APPLY SMALL AMOUNT TO AFFECTED AREA DAILY FOR 2 WEEKS       gabapentin (NEURONTIN) 600 MG tablet Take 1 tablet (600 mg) by mouth 2 times daily 120 tablet 5     ivermectin (SOOLANTRA) 1 % cream        lidocaine " "(XYLOCAINE) 2 % external gel APPLY A SMALL AMOUNT TO THE AFFECTED AREA TOPICALLY TWICE DAILY 30 mL 11     magnesium citrate solution Take 296 mLs by mouth See Admin Instructions 1 - 10 ounce bottle = 296 ml,  Not red. 296 mL 0     polyethylene glycol (GOLYTELY) 236 g suspension Take 8,000 mLs by mouth See Admin Instructions Refer to \"Two-Day Golytely (Colyte, Nulytely) Instructions for your Colonoscopy\" handout. 8000 mL 0     polyethylene glycol (MIRALAX) 17 g packet Take 1 packet by mouth daily       sennosides (SENOKOT) 8.6 MG tablet Take 1 tablet by mouth daily       vitamin D3 (CHOLECALCIFEROL) 2000 units (50 mcg) tablet Take 1 tablet by mouth daily         SOCIAL HISTORY:  Social History     Socioeconomic History     Marital status:      Spouse name: Not on file     Number of children: Not on file     Years of education: Not on file     Highest education level: Not on file   Occupational History     Not on file   Tobacco Use     Smoking status: Former Smoker     Packs/day: 1.50     Years: 15.00     Pack years: 22.50     Types: Cigarettes     Start date: 1994     Quit date: 10/1/2019     Years since quittin.9     Smokeless tobacco: Never Used     Tobacco comment: Stopped smoking cigarettes  but then started using E cigarette which I then quit using    Vaping Use     Vaping Use: Never used   Substance and Sexual Activity     Alcohol use: Not Currently     Comment: Recovering alcoholic, sobriety date 3/21/2012     Drug use: No     Sexual activity: Not Currently     Partners: Male     Birth control/protection: Condom   Other Topics Concern     Parent/sibling w/ CABG, MI or angioplasty before 65F 55M? No   Social History Narrative     Not on file     Social Determinants of Health     Financial Resource Strain: Not on file   Food Insecurity: Not on file   Transportation Needs: Not on file   Physical Activity: Not on file   Stress: Not on file   Social Connections: Not on file   Intimate " Partner Violence: Not on file   Housing Stability: Not on file       FAMILY HISTORY:  Family History   Problem Relation Age of Onset     Thyroid Disease Mother      Hypertension Mother      Diabetes Father      Hypertension Father      Depression Father      Mental Illness Father      Obesity Father      Thyroid Disease Maternal Grandmother      Hypertension Maternal Grandmother      Hypertension Maternal Grandfather      Hypertension Paternal Grandmother      Diabetes Paternal Grandfather      Prostate Cancer Paternal Grandfather      Hypertension Paternal Grandfather        PHYSICAL EXAMINATION:  Vitals reviewed  There were no vitals taken for this visit.  Video physical exam  General: Patient appears well in no acute distress.   Skin: No visualized rash or lesions on visualized skin  Eyes: EOMI, no erythema, sclera icterus or discharge noted  Resp: Appears to be breathing comfortably without accessory muscle usage, speaking in full sentences, no cough  MSK: Appears to have normal range of motion based on visualized movements  Neurologic: No apparent tremors, facial movements symmetric  Psych: affect normal, alert and oriented    The rest of a comprehensive physical examination is deferred due to PHE (public health emergency) video restrictions      PERTINENT STUDIES Reviewed in EMR    ASSESSMENT/PLAN:    # Chronic epigastric pain  # Chronic constipation  # Bloating  # Postprandial fullness    Long history of chronic constipation, with onset of intermittent epigastric pain several years ago, with unremarkable work-up in 2018 including CT AP, RUQ US, and EGD with ERCP. We discussed that it is very possible that underlying constipation and stool burden, as visualized on prior imaging, can cause pain and bloating symptoms. Chronic constipation is most consistent with CIC with suspected pelvic floor dysfunction. Pain is less consistent with biliary etiology, but could be considered. DGBI also possible. No alarm  features. UTD on colonoscopy. We discussed optimizing bowel habits and monitoring for improvement in associated symptoms. We mutually formulated the following plan:    1) Add in fiber supplement and increase dietary fiber as able with goal at least 25-30 grams per day  2) Try switching to magnesium citrate tablets, starting with 400 mg daily, with a maximum dose of 1000 mg daily  3) Referral placed to pelvic floor center  4) If able, attempt to discontinue Senna.   5) H pylori stool antigen for epigastric pain  6) HIDA order placed per patient preference  7) Future considerations for breath testing for trial of secretagogue, SIBO, dietician referral, neuromodulation (though it appears this may have been used in the past)       RTC 3 months  Thank you for this consultation. It was a pleasure to participate in the care of this patient; please contact us with any further questions.    Nasreen Pearson PA-C    61 minutes spent on the date of the encounter doing chart review, review of outside records, review of test results, patient visit and documentation

## 2022-08-30 NOTE — PATIENT INSTRUCTIONS
It was a pleasure taking care of you today.  I've included a brief summary of our discussion and care plan from today's visit below.  Please review this information with your primary care provider.  _______________________________________________________________________    My recommendations are summarized as follows:    H pylori stool test. Lab orders have been placed,which can be performed at any [place] lab at your convenience. To schedule lab appointment here, use my chart or call [phone].   2.   Recommend starting supplementation with a powdered soluble fiber. When used on a daily basis, this can help regulate the consistency of your stools. Metamucil (psyllium) and Citrucel are preferred examples. You can start with 1-2 teaspoons per day, with goal to gradually increase to 1 tablespoon daily. You can increase up to 1 tablespoon three times daily if needed. It is important to stay well-hydrated with use of fiber supplementation and to make sure that the fiber powder is well mixed with water as directed on the label. You may experience some bloating with initiation of fiber, which will improve over the first few weeks. A good trial to evaluate the effect is 3-6 months. Of note, many of the fiber products contain artificial sweeteners, which can cause bloating, gas, and diarrhea in those who may be sensitive to artificial sweeteners. If this is the case, recommend trying Metamucil Premium Blend (with Stevia), Metamucil 4-in-1 without Added Sweeteners, or Bellway (sweetened with Monk fruit extract). You can also use plain psyllium powder - if you do this, start with 1-2 teaspoons daily, increasing to twice daily as able.  3. Increase dietary fiber as able, with goal at least 25-30 grams per day.   4. Try switching your magnesium to magnesium citrate tablets, maximum 1000 mg daily.  5. Referral placed for Pelvic [facility] Center. They should call you to help set up appointment.  [address]  [place] [address]  Phone: [phone]     6. Let me know if you would like a prescription for the prescription constipation medication.  7. Order placed for gallbladder function test (HIDA scan). If you would like to schedule imaging, please call [phone]     Return to GI Clinic in 3 months to review your progress.    ______________________________________________________________________    How do I schedule labs, imaging studies, or procedures that were ordered in clinic today?     Labs: To schedule lab appointment at the [facility], use my chart or call [phone]. If you have a [place] lab closer to home where you are regularly seen you can give them a call.     Procedures: If a colonoscopy, upper endoscopy, breath test, esophageal manometry, or pH impedence was ordered today, our endoscopy team will call you to schedule this. If you have not heard from our endoscopy team within a week, please call [phone] to schedule.     Imaging Studies: If you were scheduled for a CT scan, X-ray, MRI, ultrasound, HIDA scan or other imaging study, please call [phone] to have this scheduled.     Referral: If a referral to another specialty was ordered, expect a phone call or follow instructions above. If you have not heard from anyone regarding your referral in a week, please call our clinic to check the status.     Who do I call with any questions after my visit?  Please be in touch if there are any further questions that arise following today's visit.  There are multiple ways to contact your gastroenterology care team.      During business hours, you may reach a Gastroenterology nurse at [phone]    To schedule or reschedule an appointment, please call [phone].     You can always send a secure message through [employer].  [employer] messages are answered by your nurse or doctor typically within 24 hours.  Please allow extra time on weekends and holidays.      For urgent/emergent  questions after business hours, you may reach the on-call GI Fellow by contacting the [facility]  at [phone].     How will I get the results of any tests ordered?    You will receive all of your results.  If you have signed up for [employer], any tests ordered at your visit will be available to you after your physician reviews them.  Typically this takes 1-2 weeks.  If there are urgent results that require a change in your care plan, your physician or nurse will call you to discuss the next steps.      What is [employer]?  [employer] is a secure way for you to access all of your healthcare records from the [facility].  It is a web based computer program, so you can sign on to it from any location.  It also allows you to send secure messages to your care team.  I recommend signing up for [employer] access if you have not already done so and are comfortable with using a computer.      How to I schedule a follow-up visit?  If you did not schedule a follow-up visit today, please call [phone] to schedule a follow-up office visit.      Sincerely,    Nasreen Pearson PA-C  Division of Gastroenterology, Hepatology & Nutrition  [facility]

## 2022-08-30 NOTE — LETTER
"  8/30/2022     RE: Shala Morales  [address] [address]    Dear Colleague,    Thank you for referring your patient, Shala Morales, to the [facility] [place]. Please see a copy of my visit note below.    Shala is a 47 year old who is being evaluated via a billable video visit.      How would you like to obtain your AVS? MyChart  If the video visit is dropped, the invitation should be resent by: Text to cell phone: [phone]  Will anyone else be joining your video visit? No      Video-Visit Details    Video Start Time: 150p    Type of service:  Video Visit    Video End Time:2:36 PM    Originating Location (pt. Location): Home    Distant Location (provider location):  [facility] [place]     Platform used for Video Visit: [employer]     GI CLINIC VISIT    HPI: 47 year old female with PMH of chronic pain, depression, GERD presenting to GI clinic for chronic abdominal pain and constipation    For several years, Shala has experienced episodic epigastric pain. Initially the pain was less frequent, but has become more frequent over the last few years, occurring around once per month and lasting from 2-7 days. Pain feels like \"stomach starts to eat itself\". The pain can radiate to the RUQ and back and can be accompanied by nausea. In 2018, she underwent RUQ US, CT AP, and EGD with EUS, all of which was unrevealing with the exception of moderate stool burden. The pain can worsen with constipation, but otherwise has not noted any clear triggers.    Shala has had chronic constipation with irregular bowel habits at a young age. She describes bowel movements around three times per week despite daily Senna, Magnesium, and PRN use of MOM. Dominant stool consistency is dry and hard. She has sense of incomplete evacuation. She tries not to strain and uses a squatty potty. Also doing acupuncture and working with an herbalist. GI " "ROS otherwise notable for abdominal bloating, subjective distention, and postprandial fullness. Weight gain. She had history of globus in the past felt to possibly relate to GERD so uses a wedge pillow.    Colonoscopy 2/2022 with three 2 to 6 mm polyps; one TA, remainder hyperplastic.    B - cereal  L - cucumber or peanut butter and pretzels  D salad + fake meat  No artificial sweeteners    Fhx - mom with IBS and SIBO      ROS: 10pt ROS performed and otherwise negative.    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     HTN (hypertension)      Motion sickness        PREVIOUS ABDOMINAL/GYNECOLOGIC SURGERIES:  Past Surgical History:   Procedure Laterality Date     BIOPSY  4/2018    Cervical biopsy     COLONOSCOPY N/A 2/21/2022    Procedure: COLONOSCOPY, FLEXIBLE, WITH LESION REMOVAL USING SNARE;  Surgeon: Samson Menjivar MD;  Location: [facility] OR     COLONOSCOPY WITH CO2 INSUFFLATION N/A 2/21/2022    Procedure: COLONOSCOPY, WITH CO2 INSUFFLATION;  Surgeon: Samson Menjivar MD;  Location: [facility] OR         PERTINENT MEDICATIONS:  Current Outpatient Medications   Medication Sig Dispense Refill     azelaic acid (FINACIA) 15 % external gel Apply 1 g topically 2 times daily       bisacodyl (DULCOLAX) 5 MG EC tablet Take 2 tablets (10 mg) by mouth See Admin Instructions Refer to \"Two-Day Golytely (Colyte, Nulytely)\" Instructions for your Colonoscopy handout. 2 tablet 0     crisaborole (EUCRISA) 2 % ointment Apply topically 2 times daily       Cyanocobalamin (VITAMIN B 12 PO)        diazepam 10 mg SUPP Place vaginally or rectally daily as needed. 10 suppository 3     docusate sodium (COLACE) 100 MG capsule Take 100 mg by mouth 2 times daily       DULoxetine (CYMBALTA) 60 MG capsule Take 1 capsule (60 mg) by mouth At Bedtime 90 capsule 3 " "    estradiol (ESTRACE) 0.1 MG/GM vaginal cream APPLY SMALL AMOUNT TO AFFECTED AREA DAILY FOR 2 WEEKS       gabapentin (NEURONTIN) 600 MG tablet Take 1 tablet (600 mg) by mouth 2 times daily 120 tablet 5     ivermectin (SOOLANTRA) 1 % cream        lidocaine (XYLOCAINE) 2 % external gel APPLY A SMALL AMOUNT TO THE AFFECTED AREA TOPICALLY TWICE DAILY 30 mL 11     magnesium citrate solution Take 296 mLs by mouth See Admin Instructions 1 - 10 ounce bottle = 296 ml,  Not red. 296 mL 0     polyethylene glycol (GOLYTELY) 236 g suspension Take 8,000 mLs by mouth See Admin Instructions Refer to \"Two-Day Golytely (Colyte, Nulytely) Instructions for your Colonoscopy\" handout. 8000 mL 0     polyethylene glycol (MIRALAX) 17 g packet Take 1 packet by mouth daily       sennosides (SENOKOT) 8.6 MG tablet Take 1 tablet by mouth daily       vitamin D3 (CHOLECALCIFEROL) 2000 units (50 mcg) tablet Take 1 tablet by mouth daily         SOCIAL HISTORY:  Social History     Socioeconomic History     Marital status:      Spouse name: Not on file     Number of children: Not on file     Years of education: Not on file     Highest education level: Not on file   Occupational History     Not on file   Tobacco Use     Smoking status: Former Smoker     Packs/day: 1.50     Years: 15.00     Pack years: 22.50     Types: Cigarettes     Start date: 1994     Quit date: 10/1/2019     Years since quittin.9     Smokeless tobacco: Never Used     Tobacco comment: Stopped smoking cigarettes  but then started using E cigarette which I then quit using    Vaping Use     Vaping Use: Never used   Substance and Sexual Activity     Alcohol use: Not Currently     Comment: Recovering alcoholic, sobriety date 3/21/2012     Drug use: No     Sexual activity: Not Currently     Partners: Male     Birth control/protection: Condom   Other Topics Concern     Parent/sibling w/ CABG, MI or angioplasty before 65F 55M? No   Social History Narrative     Not " on file     Social Determinants of Health     Financial Resource Strain: Not on file   Food Insecurity: Not on file   Transportation Needs: Not on file   Physical Activity: Not on file   Stress: Not on file   Social Connections: Not on file   Intimate Partner Violence: Not on file   Housing Stability: Not on file       FAMILY HISTORY:  Family History   Problem Relation Age of Onset     Thyroid Disease Mother      Hypertension Mother      Diabetes Father      Hypertension Father      Depression Father      Mental Illness Father      Obesity Father      Thyroid Disease Maternal Grandmother      Hypertension Maternal Grandmother      Hypertension Maternal Grandfather      Hypertension Paternal Grandmother      Diabetes Paternal Grandfather      Prostate Cancer Paternal Grandfather      Hypertension Paternal Grandfather        PHYSICAL EXAMINATION:  Vitals reviewed  There were no vitals taken for this visit.  Video physical exam  General: Patient appears well in no acute distress.   Skin: No visualized rash or lesions on visualized skin  Eyes: EOMI, no erythema, sclera icterus or discharge noted  Resp: Appears to be breathing comfortably without accessory muscle usage, speaking in full sentences, no cough  MSK: Appears to have normal range of motion based on visualized movements  Neurologic: No apparent tremors, facial movements symmetric  Psych: affect normal, alert and oriented    The rest of a comprehensive physical examination is deferred due to PHE (public health emergency) video restrictions      PERTINENT STUDIES Reviewed in EMR    ASSESSMENT/PLAN:    # Chronic epigastric pain  # Chronic constipation  # Bloating  # Postprandial fullness    Long history of chronic constipation, with onset of intermittent epigastric pain several years ago, with unremarkable work-up in 2018 including CT AP, RUQ US, and EGD with ERCP. We discussed that it is very possible that underlying constipation and stool burden, as visualized  on prior imaging, can cause pain and bloating symptoms. Chronic constipation is most consistent with CIC with suspected pelvic floor dysfunction. Pain is less consistent with biliary etiology, but could be considered. DGBI also possible. No alarm features. UTD on colonoscopy. We discussed optimizing bowel habits and monitoring for improvement in associated symptoms. We mutually formulated the following plan:    1) Add in fiber supplement and increase dietary fiber as able with goal at least 25-30 grams per day  2) Try switching to magnesium citrate tablets, starting with 400 mg daily, with a maximum dose of 1000 mg daily  3) Referral placed to pelvic floor center  4) If able, attempt to discontinue Senna.   5) H pylori stool antigen for epigastric pain  6) HIDA order placed per patient preference  7) Future considerations for breath testing for trial of secretagogue, SIBO, dietician referral, neuromodulation (though it appears this may have been used in the past)       RTC 3 months  Thank you for this consultation. It was a pleasure to participate in the care of this patient; please contact us with any further questions.    Nasreen Pearson PA-C    61 minutes spent on the date of the encounter doing chart review, review of outside records, review of test results, patient visit and documentation

## 2022-08-31 ENCOUNTER — TELEPHONE (OUTPATIENT)
Dept: GASTROENTEROLOGY | Facility: CLINIC | Age: 47
End: 2022-08-31

## 2022-09-08 PROCEDURE — 87338 HPYLORI STOOL AG IA: CPT

## 2022-09-12 LAB — H PYLORI AG STL QL IA: NEGATIVE

## 2022-09-13 ENCOUNTER — VIRTUAL VISIT (OUTPATIENT)
Dept: PSYCHOLOGY | Facility: CLINIC | Age: 47
End: 2022-09-13
Payer: COMMERCIAL

## 2022-09-13 ENCOUNTER — HOSPITAL ENCOUNTER (OUTPATIENT)
Dept: NUCLEAR MEDICINE | Facility: CLINIC | Age: 47
Setting detail: NUCLEAR MEDICINE
Discharge: HOME | End: 2022-09-13
Attending: PHYSICIAN ASSISTANT | Admitting: PHYSICIAN ASSISTANT
Payer: COMMERCIAL

## 2022-09-13 DIAGNOSIS — F32.89 MINOR DEPRESSIVE DISORDER: Primary | ICD-10-CM

## 2022-09-13 DIAGNOSIS — R10.13 EPIGASTRIC PAIN: ICD-10-CM

## 2022-09-13 PROCEDURE — 78227 HEPATOBIL SYST IMAGE W/DRUG: CPT | Mod: 26

## 2022-09-13 PROCEDURE — A9537 TC99M MEBROFENIN: HCPCS | Performed by: PHYSICIAN ASSISTANT

## 2022-09-13 PROCEDURE — 90834 PSYTX W PT 45 MINUTES: CPT | Mod: GT | Performed by: PSYCHOLOGIST

## 2022-09-13 PROCEDURE — 343N000001 HC RX 343: Performed by: PHYSICIAN ASSISTANT

## 2022-09-13 PROCEDURE — 78227 HEPATOBIL SYST IMAGE W/DRUG: CPT

## 2022-09-13 RX ORDER — KIT FOR THE PREPARATION OF TECHNETIUM TC 99M MEBROFENIN 45 MG/10ML
5 INJECTION, POWDER, LYOPHILIZED, FOR SOLUTION INTRAVENOUS ONCE
Status: COMPLETED | OUTPATIENT
Start: 2022-09-13 | End: 2022-09-13

## 2022-09-13 RX ADMIN — MEBROFENIN 5 MILLICURIE: 45 INJECTION, POWDER, LYOPHILIZED, FOR SOLUTION INTRAVENOUS at 08:18

## 2022-09-13 NOTE — PROGRESS NOTES
[facility]                                     Progress Note    Patient Name: Shala Morales  Date: 9/13/22         Service Type: Individual      Session Start Time: 1:06pm Session End Time: 1:58pm     Session Length: 52 minutes    Session #: 3    Attendees: Client attended alone    Service Modality:  Video Visit:      Provider verified identity through the following two step process.  Patient provided:  Patient photo    Telemedicine Visit: The patient's condition can be safely assessed and treated via synchronous audio and visual telemedicine encounter.      Reason for Telemedicine Visit: Services only offered telehealth    Originating Site (Patient Location): Patient's home    Distant Site (Provider Location): Provider Remote Setting- Home Office    Consent:  The patient/guardian has verbally consented to: the potential risks and benefits of telemedicine (video visit) versus in person care; bill my insurance or make self-payment for services provided; and responsibility for payment of non-covered services.     Patient would like the video invitation sent by:  Send to e-mail at: [email or web address]    Mode of Communication:  Video Conference via [employer]    As the provider I attest to compliance with applicable laws and regulations related to telemedicine.    DATA  Interactive Complexity: No  Crisis: No        Progress Since Last Session (Related to Symptoms / Goals / Homework):   Symptoms: No change : Stable    Homework: Partially completed      Episode of Care Goals: Satisfactory progress - PREPARATION (Decided to change - considering how); Intervened by negotiating a change plan and determining options / strategies for behavior change, identifying triggers, exploring social supports, and working towards setting a date to begin behavior change     Current / Ongoing Stressors and Concerns:   Client's health changes, depression     Treatment Objective(s) Addressed in This  Session:   Increase interest, engagement, and pleasure in doing things  Decrease frequency and intensity of feeling down, depressed, hopeless  Collaborated on Treatment Plan     Intervention:   CBT: Psychoeducation; Collaboration on Treatment Plan: clarifying goals; Assigned homework; Pattern recognition  Asked clarifying questions; Active listening, empathy, validation, and other rapport building skills    Assessments completed prior to visit:  The following assessments were completed by patient for this visit: None     ASSESSMENT: Current Emotional / Mental Status (status of significant symptoms):   Risk status (Self / Other harm or suicidal ideation)   Patient denies current fears or concerns for personal safety.   Patient denies current or recent suicidal ideation or behaviors.   Patient denies current or recent homicidal ideation or behaviors.   Patient denies current or recent self injurious behavior or ideation.   Patient denies other safety concerns.   Patient reports there has been no change in risk factors since their last session.     Patient reports there has been no change in protective factors since their last session.     Recommended that patient call 911 or go to the local ED should there be a change in any of these risk factors.     Appearance:   Appropriate    Eye Contact:   Good    Psychomotor Behavior: Normal    Attitude:   Cooperative  Friendly Pleasant   Orientation:   All   Speech    Rate / Production: Normal/ Responsive Normal     Volume:  Normal    Mood:    Normal Sad    Affect:    Appropriate    Thought Content:  Clear    Thought Form:  Coherent    Insight:    Good      Medication Review:   No changes to current psychiatric medication(s)     Medication Compliance:   Yes     Changes in Health Issues:   None reported     Chemical Use Review:   Substance Use: Chemical use reviewed, no active concerns identified      Tobacco Use: Did not address    Diagnosis:  1. Unspecified Depressive Disorder  "       Collateral Reports Completed:   Not Applicable    PLAN: (Patient Tasks / Therapist Tasks / Other)  The client will answer the question, \"Why be grateful for another day.  She will make a \"Grateful List\" each day (currently practices) and record any difficulties in doing so.  She make a follow-up session for October 11, 2022.         Rubén Hatfieldwa                                                         ______________________________________________________________________    Individual Treatment Plan    Patient's Name: Shala Morales  YOB: 1975    Date of Creation: 9/13/22  Date Treatment Plan Last Reviewed/Revised: 9/13/22    DSM5 Diagnoses: 311 (F32.9) Unspecified Depressive Disorder   Psychosocial / Contextual Factors: Client experiencing many physical/health changes in the last few years  PROMIS (reviewed every 90 days): 26 (on 8/29/22)    Referral / Collaboration:  Referral to another professional/service is not indicated at this time..    Anticipated number of session for this episode of care: 18-24  Anticipation frequency of session: Every other week  Anticipated Duration of each session: 38-52 minutes  Treatment plan will be reviewed in 90 days or when goals have been changed.       MeasurableTreatment Goal(s) related to diagnosis / functional impairment(s)  Goal 1: Patient will learn skills to be \"in a grateful mindset.\"  Barriers?: \"Focusing on me, my life, and not looking outward. Selfishness.  The focus is in a period of 2 1/2 - 3 yrs, I developed rosacea (affected eyes, face), female pattern hair loss, gained weight from laurita-menopause, and I can't exercise because of joint pain, limited swimming because of chlorine, pigmentation above upper life, pelvic floor pain, chronic pain (back), can't ride a bike, need a cushion.  Maybe what I expected my life to be just isn't.\"  Client is embarrassed to go in public (friends and family) \"because of how I look.  I've always cared " "what people think.  I have to be a mind-reader. \"      I will know I've met my goal when TBD.      Objective #A Client will continue to have a daily \"Grateful List\" and will record difficulties in doing so.    Patient will Client will continue to have a daily \"Grateful List\".  Status: New - Date: 9/13/22     Intervention(s)  Therapist will teach skills to help decrease symptoms of depression and keep client accountable to her daily list.    Objective #B  (If needed)  Patient will (if needed).  Status: (if needed)     Intervention(s)  Therapist will (if needed).      Goal 2: Patient will learn how to catch and challenge negative self-talk.    I will know I've met my goal when TBD.      Objective #A (Patient Action)    Status: NEW DATE: 9/13/22     Patient will learn to identify and challenge negative self-talk.    Intervention(s)  Therapist will assign homework as appropriate  teach client how to challenge negative self-talk.    Objective #B  (If needed)  Patient will (if needed).    Status: (if needed)     Intervention(s)  Therapist will (if needed).      Goal 3: Patiient will learn skills to \"not be paralyzed by fear.\"  \"A lot of times things boil down to fear, disappointing others, disappointing myself.\"    I will know I've met my goal when TBD.      Objective #A (Patient Action)    Status: New - Date: 9/13/22     Patient will identify her fears / thoughts that contribute to feeling anxious.    Intervention(s)  Therapist will assign homework as appropriate  teach skills to help manage anxiety.    Objective #B (If needed)  Patient will (if needed).    Status: (if needed)     Intervention(s)  Therapist will (if needed).        Patient has not reviewed nor agreed to the above plan.      Rubén Odom  September 13, 2022    "

## 2022-10-11 ENCOUNTER — VIRTUAL VISIT (OUTPATIENT)
Dept: PSYCHOLOGY | Facility: CLINIC | Age: 47
End: 2022-10-11
Payer: COMMERCIAL

## 2022-10-11 DIAGNOSIS — F32.89 MINOR DEPRESSIVE DISORDER: Primary | ICD-10-CM

## 2022-10-11 PROCEDURE — 90834 PSYTX W PT 45 MINUTES: CPT | Mod: GT | Performed by: PSYCHOLOGIST

## 2022-10-11 NOTE — PROGRESS NOTES
"[facility]                                     Progress Note    Patient Name: Shala Morales  Date: 10/11/22         Service Type: Individual      Session Start Time: 3:00pm Session End Time: 3:52pm     Session Length: 52 minutes      Session #: 4    Attendees: Client attended alone    Service Modality:  Video Visit:      Provider verified identity through the following two step process.  Patient provided:  Patient photo    Telemedicine Visit: The patient's condition can be safely assessed and treated via synchronous audio and visual telemedicine encounter.      Reason for Telemedicine Visit: Services only offered telehealth    Originating Site (Patient Location): Patient's home    Distant Site (Provider Location): Provider Remote Setting- Home Office    Consent:  The patient/guardian has verbally consented to: the potential risks and benefits of telemedicine (video visit) versus in person care; bill my insurance or make self-payment for services provided; and responsibility for payment of non-covered services.     Patient would like the video invitation sent by:  Send to e-mail at: [email or web address]    Mode of Communication:  Video Conference via [employer]    As the provider I attest to compliance with applicable laws and regulations related to telemedicine.    DATA  Interactive Complexity: No  Crisis: No        Progress Since Last Session (Related to Symptoms / Goals / Homework):   Symptoms: No change : Stable    Homework: Partially completed      Episode of Care Goals: Satisfactory progress - ACTION (Actively working towards change); Intervened by reinforcing change plan / affirming steps taken     Current / Ongoing Stressors and Concerns:   Client's health changes, depression    Notes from 10/11/22 Session:  Cl acknowledged doing \"some\" homework, \"I had a hard time with the goals.\"  She did a gratitude list and read them during session.  What got in the way of creating the gratitude " "list, \"Just trying to remember it.\"    \"Maybe I'll try it again (gratitude list).\"   Helped distinguish between \"should\" and other words like \"could\"  Asked about \"good enough\"       Treatment Objective(s) Addressed in This Session:   Increase interest, engagement, and pleasure in doing things  Decrease frequency and intensity of feeling down, depressed, hopeless  Discussed the use of the word \"should\" vs \"could (or other substitute).\"  Reviewed and assigned homework; Discussed the concept of \"good enough\"     Intervention:   CBT: Psychoeducation (\"Should vs Could\"), \"good enough,\" Using Mindfulness with gratitude list, Using Nonjudgmental stance with self; Reviewed and Assigned homework; Pattern recognition  Asked clarifying questions; Active listening, empathy, validation, and other rapport building skills    Assessments completed prior to visit:  The following assessments were completed by patient for this visit: None     ASSESSMENT: Current Emotional / Mental Status (status of significant symptoms):   Risk status (Self / Other harm or suicidal ideation)   Patient denies current fears or concerns for personal safety.   Patient denies current or recent suicidal ideation or behaviors.   Patient denies current or recent homicidal ideation or behaviors.   Patient denies current or recent self injurious behavior or ideation.   Patient denies other safety concerns.   Patient reports there has been no change in risk factors since their last session.     Patient reports there has been no change in protective factors since their last session.     Recommended that patient call 911 or go to the local ED should there be a change in any of these risk factors.     Appearance:   Appropriate    Eye Contact:   Good    Psychomotor Behavior: Normal    Attitude:   Cooperative  Friendly Pleasant   Orientation:   All   Speech    Rate / Production: Normal/ Responsive Normal     Volume:  Normal    Mood:    Normal Sad " "   Affect:    Appropriate    Thought Content:  Clear    Thought Form:  Coherent    Insight:    Good      Medication Review:   No changes to current psychiatric medication(s)     Medication Compliance:   Yes     Changes in Health Issues:   None reported     Chemical Use Review:   Substance Use: Chemical use reviewed, no active concerns identified      Tobacco Use: Did not address    Diagnosis:  1. Unspecified Depressive Disorder        Collateral Reports Completed:   Not Applicable    PLAN: (Patient Tasks / Therapist Tasks / Other)  The client will continue to answer the question, Mindfully, \"Why be grateful for another day.  She will make a \"Grateful List\" each day (currently practices) and record any difficulties in doing so.  Client's Treatment Plan will be updated next session.  She make a follow-up session for November 7, 2022.         Rubén Odom                                                         ______________________________________________________________________    Individual Treatment Plan    Patient's Name: Shala Morales  YOB: 1975    Date of Creation: 9/13/22  Date Treatment Plan Last Reviewed/Revised: 9/13/22    DSM5 Diagnoses: 311 (F32.9) Unspecified Depressive Disorder   Psychosocial / Contextual Factors: Client experiencing many physical/health changes in the last few years  PROMIS (reviewed every 90 days): 26 (on 8/29/22)    Referral / Collaboration:  Referral to another professional/service is not indicated at this time..    Anticipated number of session for this episode of care: 18-24  Anticipation frequency of session: Every other week  Anticipated Duration of each session: 38-52 minutes  Treatment plan will be reviewed in 90 days or when goals have been changed.       MeasurableTreatment Goal(s) related to diagnosis / functional impairment(s)  Goal 1: Patient will learn skills to be \"in a grateful mindset.\"  Barriers?: \"Focusing on me, my life, and not looking " "outward. Selfishness.  The focus is in a period of 2 1/2 - 3 yrs, I developed rosacea (affected eyes, face), female pattern hair loss, gained weight from laurita-menopause, and I can't exercise because of joint pain, limited swimming because of chlorine, pigmentation above upper life, pelvic floor pain, chronic pain (back), can't ride a bike, need a cushion.  Maybe what I expected my life to be just isn't.\"  Client is embarrassed to go in public (friends and family) \"because of how I look.  I've always cared what people think.  I have to be a mind-reader. \"      I will know I've met my goal when TBD.      Objective #A Client will continue to have a daily \"Grateful List\" and will record difficulties in doing so.    Patient will Client will continue to have a daily \"Grateful List\".  Status: New - Date: 9/13/22     Intervention(s)  Therapist will teach skills to help decrease symptoms of depression and keep client accountable to her daily list.    Objective #B  (If needed)  Patient will (if needed).  Status: (if needed)     Intervention(s)  Therapist will (if needed).      Goal 2: Patient will learn how to catch and challenge negative self-talk.    I will know I've met my goal when TBD.      Objective #A (Patient Action)    Status: NEW DATE: 9/13/22     Patient will learn to identify and challenge negative self-talk.    Intervention(s)  Therapist will assign homework as appropriate  teach client how to challenge negative self-talk.    Objective #B  (If needed)  Patient will (if needed).    Status: (if needed)     Intervention(s)  Therapist will (if needed).      Goal 3: Patiient will learn skills to \"not be paralyzed by fear.\"  \"A lot of times things boil down to fear, disappointing others, disappointing myself.\"    I will know I've met my goal when TBD.      Objective #A (Patient Action)    Status: New - Date: 9/13/22     Patient will identify her fears / thoughts that contribute to feeling " anxious.    Intervention(s)  Therapist will assign homework as appropriate  teach skills to help manage anxiety.    Objective #B (If needed)  Patient will (if needed).    Status: (if needed)     Intervention(s)  Therapist will (if needed).        Patient has not reviewed nor agreed to the above plan.      Rubén Odom  October 11, 2022

## 2022-10-24 ENCOUNTER — TELEPHONE (OUTPATIENT)
Dept: GASTROENTEROLOGY | Facility: CLINIC | Age: 47
End: 2022-10-24

## 2022-10-24 NOTE — TELEPHONE ENCOUNTER
LVM to let pt know that Nasreen Pearson is not seeing pts in person at this time so their appointment has been changed to virtual.     Left phone number in case the pt needs to call back for any questions

## 2022-11-07 ENCOUNTER — VIRTUAL VISIT (OUTPATIENT)
Dept: PSYCHOLOGY | Facility: CLINIC | Age: 47
End: 2022-11-07
Payer: COMMERCIAL

## 2022-11-07 DIAGNOSIS — F32.89 MINOR DEPRESSIVE DISORDER: Primary | ICD-10-CM

## 2022-11-07 PROCEDURE — 90834 PSYTX W PT 45 MINUTES: CPT | Mod: GT | Performed by: PSYCHOLOGIST

## 2022-11-07 NOTE — PROGRESS NOTES
"[facility]                                     Progress Note    Patient Name: Shala Morales  Date: 11/7/22         Service Type: Individual      Session Start Time: 3:00pm Session End Time: 3:52pm     Session Length: 52 minutes      Session #: 5    Attendees: Client attended alone    Service Modality:  Video Visit:      Provider verified identity through the following two step process.  Patient provided:  Patient photo    Telemedicine Visit: The patient's condition can be safely assessed and treated via synchronous audio and visual telemedicine encounter.      Reason for Telemedicine Visit: Services only offered telehealth    Originating Site (Patient Location): Patient's home    Distant Site (Provider Location): Provider Remote Setting- Home Office    Consent:  The patient/guardian has verbally consented to: the potential risks and benefits of telemedicine (video visit) versus in person care; bill my insurance or make self-payment for services provided; and responsibility for payment of non-covered services.     Patient would like the video invitation sent by:  Send to e-mail at: [email or web address]    Mode of Communication:  Video Conference via [employer]well    As the provider I attest to compliance with applicable laws and regulations related to telemedicine.    DATA  Interactive Complexity: No  Crisis: No        Progress Since Last Session (Related to Symptoms / Goals / Homework):   Symptoms: No change : Stable    Homework: Partially completed      Episode of Care Goals: Satisfactory progress - ACTION (Actively working towards change); Intervened by reinforcing change plan / affirming steps taken     Current / Ongoing Stressors and Concerns:   Client's health changes, depression      Notes from 11/7/22 Session:  Cl met with a pain specialist and \"she taught me a lot about the psychology of pain. \"    Cl has been trying to break the association between feeling the intense pain she felt, " "and continues to struggle with, and fight or flight kicking in whenever she feels an unexplained pain.    Cl stated \"I've had the same dream for three nights in a row.\" - described the dream. - Helped process.    HW:   Observe people at work for the potential of befriending them (who approaches you, who seems open to talking, anyone you really click with).  Work on Gratitude List.  Be mindful of extinguishing the Fight or Flight response, particularly cognitively.       Treatment Objective(s) Addressed in This Session:   Client talked about the \"psychology of pain\" and helped to process extinguising the association between an unknown pain with a worst case scenario healthwise.  Client talked about wanting to grow her healthy social network and discussed how to identify people who could potentially be a friend     Intervention:   CBT: Psychoeducation (\"Process of making friends\"), Extinguishing the association between pain and catastrophizing;  Reviewed and Assigned homework; Pattern recognition  Asked clarifying questions; Active listening, empathy, validation, and other rapport building skills    Assessments completed prior to visit:  The following assessments were completed by patient for this visit: None     ASSESSMENT: Current Emotional / Mental Status (status of significant symptoms):   Risk status (Self / Other harm or suicidal ideation)   Patient denies current fears or concerns for personal safety.   Patient denies current or recent suicidal ideation or behaviors.   Patient denies current or recent homicidal ideation or behaviors.   Patient denies current or recent self injurious behavior or ideation.   Patient denies other safety concerns.   Patient reports there has been no change in risk factors since their last session.     Patient reports there has been no change in protective factors since their last session.     Recommended that patient call 911 or go to the local ED should there be a change in any of " these risk factors.     Appearance:   Appropriate    Eye Contact:   Good    Psychomotor Behavior: Normal    Attitude:   Cooperative  Friendly Pleasant   Orientation:   All   Speech    Rate / Production: Normal/ Responsive Normal     Volume:  Normal    Mood:    Normal Sad    Affect:    Appropriate    Thought Content:  Clear    Thought Form:  Coherent    Insight:    Good      Medication Review:   No changes to current psychiatric medication(s)     Medication Compliance:   Yes     Changes in Health Issues:   None reported     Chemical Use Review:   Substance Use: Chemical use reviewed, no active concerns identified      Tobacco Use: Did not address    Diagnosis:  1. Unspecified Depressive Disorder        Collateral Reports Completed:   Not Applicable    PLAN: (Patient Tasks / Therapist Tasks / Other)  The client will observe people at work for the potential of befriending them (who approaches you, who seems open to talking, anyone you really click with).  Work on Gratitude List.  Be mindful of extinguishing the Fight or Flight response, particularly cognitively.  She make a follow-up session for November 21, 2022.         Rubén Odom                                                         ______________________________________________________________________    Individual Treatment Plan    Patient's Name: Shala Morales  YOB: 1975    Date of Creation: 9/13/22  Date Treatment Plan Last Reviewed/Revised: 9/13/22    DSM5 Diagnoses: 311 (F32.9) Unspecified Depressive Disorder   Psychosocial / Contextual Factors: Client experiencing many physical/health changes in the last few years  PROMIS (reviewed every 90 days): 26 (on 8/29/22)    Referral / Collaboration:  Referral to another professional/service is not indicated at this time..    Anticipated number of session for this episode of care: 18-24  Anticipation frequency of session: Every other week  Anticipated Duration of each session: 38-52  "minutes  Treatment plan will be reviewed in 90 days or when goals have been changed.       MeasurableTreatment Goal(s) related to diagnosis / functional impairment(s)  Goal 1: Patient will learn skills to be \"in a grateful mindset.\"  Barriers?: \"Focusing on me, my life, and not looking outward. Selfishness.  The focus is in a period of 2 1/2 - 3 yrs, I developed rosacea (affected eyes, face), female pattern hair loss, gained weight from laurita-menopause, and I can't exercise because of joint pain, limited swimming because of chlorine, pigmentation above upper life, pelvic floor pain, chronic pain (back), can't ride a bike, need a cushion.  Maybe what I expected my life to be just isn't.\"  Client is embarrassed to go in public (friends and family) \"because of how I look.  I've always cared what people think.  I have to be a mind-reader. \"      I will know I've met my goal when TBD.      Objective #A Client will continue to have a daily \"Grateful List\" and will record difficulties in doing so.    Patient will Client will continue to have a daily \"Grateful List\".  Status: New - Date: 9/13/22     Intervention(s)  Therapist will teach skills to help decrease symptoms of depression and keep client accountable to her daily list.    Objective #B  (If needed)  Patient will (if needed).  Status: (if needed)     Intervention(s)  Therapist will (if needed).      Goal 2: Patient will learn how to catch and challenge negative self-talk.    I will know I've met my goal when TBD.      Objective #A (Patient Action)    Status: NEW DATE: 9/13/22     Patient will learn to identify and challenge negative self-talk.    Intervention(s)  Therapist will assign homework as appropriate  teach client how to challenge negative self-talk.    Objective #B  (If needed)  Patient will (if needed).    Status: (if needed)     Intervention(s)  Therapist will (if needed).      Goal 3: Patiient will learn skills to \"not be paralyzed by fear.\"  \"A lot of " "times things boil down to fear, disappointing others, disappointing myself.\"    I will know I've met my goal when TBD.      Objective #A (Patient Action)    Status: New - Date: 9/13/22     Patient will identify her fears / thoughts that contribute to feeling anxious.    Intervention(s)  Therapist will assign homework as appropriate  teach skills to help manage anxiety.    Objective #B (If needed)  Patient will (if needed).    Status: (if needed)     Intervention(s)  Therapist will (if needed).        Patient has not reviewed nor agreed to the above plan.      Rubén Odom  November 7, 2022  "

## 2022-12-13 ENCOUNTER — VIRTUAL VISIT (OUTPATIENT)
Dept: PSYCHOLOGY | Facility: CLINIC | Age: 47
End: 2022-12-13
Payer: COMMERCIAL

## 2022-12-13 DIAGNOSIS — F32.89 MINOR DEPRESSIVE DISORDER: Primary | ICD-10-CM

## 2022-12-13 PROCEDURE — 90832 PSYTX W PT 30 MINUTES: CPT | Mod: GT | Performed by: PSYCHOLOGIST

## 2022-12-13 NOTE — PROGRESS NOTES
"[facility]                                     Progress Note    Patient Name: Shala Morales  Date: 12/13/22         Service Type: Individual      Session Start Time: 1:05pm Session End Time: 1:38pm     Session Length: 33 minutes      Session #: 6    Attendees: Client attended alone    Service Modality:  Video Visit:      Provider verified identity through the following two step process.  Patient provided:  Patient photo    Telemedicine Visit: The patient's condition can be safely assessed and treated via synchronous audio and visual telemedicine encounter.      Reason for Telemedicine Visit: Services only offered telehealth    Originating Site (Patient Location): Patient's home    Distant Site (Provider Location): Provider Remote Setting- Home Office    Consent:  The patient/guardian has verbally consented to: the potential risks and benefits of telemedicine (video visit) versus in person care; bill my insurance or make self-payment for services provided; and responsibility for payment of non-covered services.     Patient would like the video invitation sent by:  Send to e-mail at: [email or web address]    Mode of Communication:  Video Conference via Amwell    As the provider I attest to compliance with applicable laws and regulations related to telemedicine.    DATA  Interactive Complexity: No  Crisis: No        Progress Since Last Session (Related to Symptoms / Goals / Homework):   Symptoms: Improving : \"I've been feeling pretty good.\"    Homework: Partially completed      Episode of Care Goals: Satisfactory progress - ACTION (Actively working towards change); Intervened by reinforcing change plan / affirming steps taken     Current / Ongoing Stressors and Concerns:   Client's health changes, depression      Notes from 12/13/22 Session:  Cl stated she is doing well.  She is socializing, exercising (swimming, water aerobics), challenging her negative self-image (swimming in front of " "others), she is going to entertain for the holidays.    Discussed interpersonal relationships (Do I have to go over and spend time with my 's family?  I don't really enjoy it.).    Discussed future session (options on how to schedule on an \"as needed\" basis: 1) email provider, 2) Call [facility] [phone], 3) via [employer].       Treatment Objective(s) Addressed in This Session:   Client updated this writer on her current experience with pain  Increase interest, engagement, and pleasure in doing things  Decrease frequency and intensity of feeling down, depressed, hopeless  Client is socializing more and swimming to help with managing depression.  She talked about \"eating or being on my computer as a way to distract.\"  Discussed alternatives or eating healthy snacks.  Answered questions for client regarding family relationships during the holiday season.  Also talked about scheduling going forward on a \"as needed\" basis.     Intervention:   CBT: Psychoeducation; Interpersonal relationships and decision making;  Reviewed and Assigned homework; Pattern recognition; Client identified challenging her negative self- talk regarding her image in order to get back into swimming; Cognitive restructuring  Active listening, empathy, validation, and other rapport building skills    Assessments completed prior to visit:  The following assessments were completed by patient for this visit: None     ASSESSMENT: Current Emotional / Mental Status (status of significant symptoms):   Risk status (Self / Other harm or suicidal ideation)   Patient denies current fears or concerns for personal safety.   Patient denies current or recent suicidal ideation or behaviors.   Patient denies current or recent homicidal ideation or behaviors.   Patient denies current or recent self injurious behavior or ideation.   Patient denies other safety concerns.   Patient reports there has been no change in risk factors since their " "last session.     Patient reports there has been no change in protective factors since their last session.     Recommended that patient call 911 or go to the local ED should there be a change in any of these risk factors.     Appearance:   Appropriate    Eye Contact:   Good    Psychomotor Behavior: Normal    Attitude:   Cooperative  Friendly Pleasant   Orientation:   All   Speech    Rate / Production: Normal/ Responsive Normal     Volume:  Normal    Mood:    Normal Sad    Affect:    Appropriate    Thought Content:  Clear    Thought Form:  Coherent    Insight:    Good      Medication Review:   No changes to current psychiatric medication(s)     Medication Compliance:   Yes     Changes in Health Issues:   None reported     Chemical Use Review:   Substance Use: Chemical use reviewed, no active concerns identified      Tobacco Use: Did not address    Diagnosis:  1. Unspecified Depressive Disorder        Collateral Reports Completed:   Not Applicable    PLAN: (Patient Tasks / Therapist Tasks / Other)  The client will continue to challenge her negative self-talk regarding her self-image.  She will continue to socialize and exercise to help manage depression. She was encouraged to keep healthy snacks handy to eat if she wants to distract with eating.  The client will plan her next follow-up session on an \"as needed\" basis.         Rubén Odom                                                         ______________________________________________________________________    Individual Treatment Plan    Patient's Name: Shala Morales  YOB: 1975    Date of Creation: 9/13/22  Date Treatment Plan Last Reviewed/Revised: 9/13/22    DSM5 Diagnoses: 311 (F32.9) Unspecified Depressive Disorder   Psychosocial / Contextual Factors: Client experiencing many physical/health changes in the last few years  PROMIS (reviewed every 90 days): 26 (on 8/29/22)    Referral / Collaboration:  Referral to another " "professional/service is not indicated at this time..    Anticipated number of session for this episode of care: 18-24  Anticipation frequency of session: Every other week  Anticipated Duration of each session: 38-52 minutes  Treatment plan will be reviewed in 90 days or when goals have been changed.       MeasurableTreatment Goal(s) related to diagnosis / functional impairment(s)  Goal 1: Patient will learn skills to be \"in a grateful mindset.\"  Barriers?: \"Focusing on me, my life, and not looking outward. Selfishness.  The focus is in a period of 2 1/2 - 3 yrs, I developed rosacea (affected eyes, face), female pattern hair loss, gained weight from laurita-menopause, and I can't exercise because of joint pain, limited swimming because of chlorine, pigmentation above upper life, pelvic floor pain, chronic pain (back), can't ride a bike, need a cushion.  Maybe what I expected my life to be just isn't.\"  Client is embarrassed to go in public (friends and family) \"because of how I look.  I've always cared what people think.  I have to be a mind-reader. \"      I will know I've met my goal when TBD.      Objective #A Client will continue to have a daily \"Grateful List\" and will record difficulties in doing so.    Patient will Client will continue to have a daily \"Grateful List\".  Status: New - Date: 9/13/22     Intervention(s)  Therapist will teach skills to help decrease symptoms of depression and keep client accountable to her daily list.    Objective #B  (If needed)  Patient will (if needed).  Status: (if needed)     Intervention(s)  Therapist will (if needed).      Goal 2: Patient will learn how to catch and challenge negative self-talk.    I will know I've met my goal when TBD.      Objective #A (Patient Action)    Status: NEW DATE: 9/13/22     Patient will learn to identify and challenge negative self-talk.    Intervention(s)  Therapist will assign homework as appropriate  teach client how to challenge negative " "self-talk.    Objective #B  (If needed)  Patient will (if needed).    Status: (if needed)     Intervention(s)  Therapist will (if needed).      Goal 3: Patient will learn skills to \"not be paralyzed by fear.\"  \"A lot of times things boil down to fear, disappointing others, disappointing myself.\"    I will know I've met my goal when TBD.      Objective #A (Patient Action)    Status: New - Date: 9/13/22     Patient will identify her fears / thoughts that contribute to feeling anxious.    Intervention(s)  Therapist will assign homework as appropriate  teach skills to help manage anxiety.    Objective #B (If needed)  Patient will (if needed).    Status: (if needed)     Intervention(s)  Therapist will (if needed).        Patient has not reviewed nor agreed to the above plan.      Rubén Odom  December 13, 2022  "

## 2023-01-10 DIAGNOSIS — R10.11 RUQ ABDOMINAL PAIN: Primary | ICD-10-CM

## 2023-01-12 NOTE — TELEPHONE ENCOUNTER
REFERRAL INFORMATION:    Referring Provider:  Nasreen Pearson PA-C    Referring Clinic:  [facility]     Reason for Visit/Diagnosis: ** Biliary colic. Records in EPIC // appt per patient       FUTURE VISIT INFORMATION:    Appointment Date: 1/17/23    Appointment Time: 10AM     NOTES RECORD STATUS  DETAILS   OFFICE NOTE from Referring Provider Internal 8/30/22 OV Nasreen Pearson PA-C   HOSPITAL DISCHARGE SUMMARY/ ED VISITS  Care Everywhere 11/11/18 ED note at [facility] with Susana Helton MD     ENDOSCOPY (EGD)  Received 12/11/18 - scanned in Epic upper EUS    IMAGING (CT, MRI, US, XR)  Internal      In process - PACS 9/13/22 NM Hep Scan   1/20/22 CT Abd Pelvis   8/11/21 XR Pelvis   8/14/18 US Abdomen     [facility]   11/11/18 CT Abd Pelvis and Us Abdomen      1/12/23 2:26PM called [facility], images will be pushed over shortly - Amay

## 2023-01-16 ASSESSMENT — ENCOUNTER SYMPTOMS
RECTAL PAIN: 1
DECREASED APPETITE: 1
SMELL DISTURBANCE: 0
POLYDIPSIA: 0
NECK MASS: 0
WEIGHT GAIN: 1
FEVER: 0
HOARSE VOICE: 0
HEARTBURN: 1
BLOATING: 1
TASTE DISTURBANCE: 0
BACK PAIN: 1
BOWEL INCONTINENCE: 0
STIFFNESS: 1
MUSCLE CRAMPS: 0
BLOOD IN STOOL: 0
HALLUCINATIONS: 0
FATIGUE: 0
TROUBLE SWALLOWING: 0
SORE THROAT: 0
VOMITING: 0
MYALGIAS: 1
POLYPHAGIA: 0
INCREASED ENERGY: 0
ABDOMINAL PAIN: 1
SINUS CONGESTION: 0
SINUS PAIN: 0
WEIGHT LOSS: 0
JOINT SWELLING: 0
ARTHRALGIAS: 1
NECK PAIN: 0
CHILLS: 0
MUSCLE WEAKNESS: 0
NAUSEA: 1
CONSTIPATION: 1
JAUNDICE: 0
DIARRHEA: 1
ALTERED TEMPERATURE REGULATION: 0
NIGHT SWEATS: 0

## 2023-01-17 ENCOUNTER — OFFICE VISIT (OUTPATIENT)
Dept: SURGERY | Facility: CLINIC | Age: 48
End: 2023-01-17
Payer: COMMERCIAL

## 2023-01-17 ENCOUNTER — PRE VISIT (OUTPATIENT)
Dept: SURGERY | Facility: CLINIC | Age: 48
End: 2023-01-17

## 2023-01-17 VITALS
OXYGEN SATURATION: 99 % | WEIGHT: 195.4 LBS | BODY MASS INDEX: 28.94 KG/M2 | SYSTOLIC BLOOD PRESSURE: 129 MMHG | DIASTOLIC BLOOD PRESSURE: 76 MMHG | HEART RATE: 73 BPM | HEIGHT: 69 IN

## 2023-01-17 DIAGNOSIS — R52 PAIN: Primary | ICD-10-CM

## 2023-01-17 PROCEDURE — 99203 OFFICE O/P NEW LOW 30 MIN: CPT | Performed by: SURGERY

## 2023-01-17 ASSESSMENT — PAIN SCALES - GENERAL: PAINLEVEL: NO PAIN (0)

## 2023-01-17 NOTE — PROGRESS NOTES
Shala Morales is a 47 year old female with a long history of abdominal pain epigastric radiating to right side.  Symptoms are not associated with fatty food intake.  Associated symptoms: nausea  Common duct symptoms:  None  Diet tolerance:  good  LFT's:  normal    Image studies included:  CAT Scan  Findings:  Normal gallbladder without inflammation, enlargement or stones  IMPRESSION:   1.  There is mild dilatation of the right urinary collecting system,  with no definite cause identified. This finding is of uncertain  clinical significance, and may be chronic.  2.  Otherwise unremarkable CT of the abdomen and pelvis. No cause for  abdominal bloating is identified.    NM HEPATOBILIARY SCAN WITH GB EF    Impression:  1. Normal hepatobiliary scan without evidence for acute or chronic  cholecystitis.  2. Enterogastric reflux was not present.    Past medical and surgical history, medications, allergies, family history, and social history were reviewed with the patient.    ROS: 10 point review of systems negative except noted in HPI  PHYSICAL EXAM  General appearance- healthy, alert, and in no distress.  Neck- Neck is supple without obvious adenopathy.  Lungs- Respiratory effort unlabored.  Gait- Normal.  Abdomen - flat, soft non distended.    Impression: Abdominal pain unclear etiology.   Diagnostic lap grady is an option but given work up so far, cannot guarantee resolution of patient's current symptoms with removal gallbladder.   A full discussion regarding the alternatives, risks, goals, and potential complications for this surgery was completed today.  The patient understood that the potential problems included but are not limited to:  Infection, bleeding, bile leak, injury to structures about the gallbladder, possible common duct stone requiring further procedures. Most current review of literature confirm the more common specific risks related to laparoscopic cholecystectomy include bile duct injury (3/1000),  bile leak (10/1000), retained common bile duct stone (10/1000), postcholecystectomy diarrhea (1-2%) and these complications may require additional treatment.    At this point patient will consider her options and contact us should she want to proceed with surgery.      The total time spent with this patient was 30 minutes.  The total time was spent on the date of encounter doing chart review, history and physical, dressing changes, documentation, patient education, and any further activity as noted above.

## 2023-01-17 NOTE — NURSING NOTE
"Chief Complaint   Patient presents with     New Patient     Biliary colic       Vitals:    01/17/23 0959   BP: 129/76   BP Location: Left arm   Patient Position: Sitting   Cuff Size: Adult Regular   Pulse: 73   SpO2: 99%   Weight: 88.6 kg (195 lb 6.4 oz)   Height: 1.74 m (5' 8.5\")       Body mass index is 29.28 kg/m .                          Blair Joseph, EMT    "

## 2023-01-17 NOTE — LETTER
1/17/2023       RE: Shala Morales  [address] [address]     Dear Colleague,    Thank you for referring your patient, Shala Morales, to the [facility] [place] [facility]. Please see a copy of my visit note below.    Shala Morales is a 47 year old female with a long history of abdominal pain epigastric radiating to right side.  Symptoms are not associated with fatty food intake.  Associated symptoms: nausea  Common duct symptoms:  None  Diet tolerance:  good  LFT's:  normal    Image studies included:  CAT Scan  Findings:  Normal gallbladder without inflammation, enlargement or stones  IMPRESSION:   1.  There is mild dilatation of the right urinary collecting system,  with no definite cause identified. This finding is of uncertain  clinical significance, and may be chronic.  2.  Otherwise unremarkable CT of the abdomen and pelvis. No cause for  abdominal bloating is identified.    NM HEPATOBILIARY SCAN WITH GB EF    Impression:  1. Normal hepatobiliary scan without evidence for acute or chronic  cholecystitis.  2. Enterogastric reflux was not present.    Past medical and surgical history, medications, allergies, family history, and social history were reviewed with the patient.    ROS: 10 point review of systems negative except noted in HPI  PHYSICAL EXAM  General appearance- healthy, alert, and in no distress.  Neck- Neck is supple without obvious adenopathy.  Lungs- Respiratory effort unlabored.  Gait- Normal.  Abdomen - flat, soft non distended.    Impression: Abdominal pain unclear etiology.   Diagnostic lap grady is an option but given work up so far, cannot guarantee resolution of patient's current symptoms with removal gallbladder.   A full discussion regarding the alternatives, risks, goals, and potential complications for this surgery was completed today.  The patient understood that the  potential problems included but are not limited to:  Infection, bleeding, bile leak, injury to structures about the gallbladder, possible common duct stone requiring further procedures. Most current review of literature confirm the more common specific risks related to laparoscopic cholecystectomy include bile duct injury (3/1000), bile leak (10/1000), retained common bile duct stone (10/1000), postcholecystectomy diarrhea (1-2%) and these complications may require additional treatment.    At this point patient will consider her options and contact us should she want to proceed with surgery.      The total time spent with this patient was 30 minutes.  The total time was spent on the date of encounter doing chart review, history and physical, dressing changes, documentation, patient education, and any further activity as noted above.      Sincerely,    Zach Herrera MD

## 2023-01-17 NOTE — PATIENT INSTRUCTIONS
You met with Dr. Zach Herrera.      Today's visit instructions:    Per Dr. Herrera, a laparoscopic cholecystectomy would be diagnostic as opposed to therapeutic. If you decide to proceed with this surgery please call us at the Nurse Advice line listed below.        If you have questions please contact Coco RN or Berta RN during regular clinic hours, Monday through Friday 7:30 AM - 4:00 PM, or you can contact us via [employer] at anytime.       If you have urgent needs after-hours, weekends, or holidays please call the hospital at [phone] and ask to speak with our on-call General Surgery Team.    Appointment [phone]  Nurse Advice (Coco or Berta): [phone]   Surgery Scheduler (Gissell): [phone]  Fax: [phone]

## 2023-01-22 ASSESSMENT — ENCOUNTER SYMPTOMS
FREQUENCY: 0
DIARRHEA: 0
NAUSEA: 0
PARESTHESIAS: 0
PALPITATIONS: 0
NERVOUS/ANXIOUS: 0
ABDOMINAL PAIN: 1
HEARTBURN: 0
MYALGIAS: 0
CHILLS: 0
COUGH: 0
JOINT SWELLING: 0
WEAKNESS: 0
HEMATOCHEZIA: 0
FEVER: 0
HEMATURIA: 0
HEADACHES: 0
CONSTIPATION: 1
ARTHRALGIAS: 1
BREAST MASS: 0
DIZZINESS: 0
EYE PAIN: 0
SHORTNESS OF BREATH: 0
SORE THROAT: 0
DYSURIA: 0

## 2023-01-24 ENCOUNTER — OFFICE VISIT (OUTPATIENT)
Dept: INTERNAL MEDICINE | Facility: CLINIC | Age: 48
End: 2023-01-24
Payer: COMMERCIAL

## 2023-01-24 VITALS
OXYGEN SATURATION: 99 % | WEIGHT: 194 LBS | HEART RATE: 67 BPM | BODY MASS INDEX: 28.73 KG/M2 | DIASTOLIC BLOOD PRESSURE: 79 MMHG | SYSTOLIC BLOOD PRESSURE: 121 MMHG | HEIGHT: 69 IN | TEMPERATURE: 98.4 F

## 2023-01-24 DIAGNOSIS — H90.5 SENSORINEURAL HEARING LOSS (SNHL) OF LEFT EAR, UNSPECIFIED HEARING STATUS ON CONTRALATERAL SIDE: ICD-10-CM

## 2023-01-24 DIAGNOSIS — R10.11 RUQ ABDOMINAL PAIN: ICD-10-CM

## 2023-01-24 DIAGNOSIS — E78.5 HYPERLIPIDEMIA LDL GOAL <130: ICD-10-CM

## 2023-01-24 DIAGNOSIS — R63.5 WEIGHT GAIN: ICD-10-CM

## 2023-01-24 DIAGNOSIS — Z78.9 VEGAN DIET: ICD-10-CM

## 2023-01-24 DIAGNOSIS — R10.2 PELVIC PAIN IN FEMALE: ICD-10-CM

## 2023-01-24 DIAGNOSIS — J84.10 LUNG GRANULOMA (H): ICD-10-CM

## 2023-01-24 DIAGNOSIS — Z23 HIGH PRIORITY FOR 2019-NCOV VACCINE: ICD-10-CM

## 2023-01-24 DIAGNOSIS — Z00.01 ENCOUNTER FOR GENERAL ADULT MEDICAL EXAMINATION WITH ABNORMAL FINDINGS: Primary | ICD-10-CM

## 2023-01-24 DIAGNOSIS — Z12.31 ENCOUNTER FOR SCREENING MAMMOGRAM FOR BREAST CANCER: ICD-10-CM

## 2023-01-24 LAB
ALBUMIN SERPL-MCNC: 3.7 G/DL (ref 3.4–5)
ALP SERPL-CCNC: 75 U/L (ref 40–150)
ALT SERPL W P-5'-P-CCNC: 24 U/L (ref 0–50)
ANION GAP SERPL CALCULATED.3IONS-SCNC: 2 MMOL/L (ref 3–14)
AST SERPL W P-5'-P-CCNC: 15 U/L (ref 0–45)
BILIRUB DIRECT SERPL-MCNC: 0.1 MG/DL (ref 0–0.2)
BILIRUB SERPL-MCNC: 0.4 MG/DL (ref 0.2–1.3)
BUN SERPL-MCNC: 9 MG/DL (ref 7–30)
CALCIUM SERPL-MCNC: 9 MG/DL (ref 8.5–10.1)
CHLORIDE BLD-SCNC: 106 MMOL/L (ref 94–109)
CHOLEST SERPL-MCNC: 164 MG/DL
CO2 SERPL-SCNC: 30 MMOL/L (ref 20–32)
CREAT SERPL-MCNC: 0.7 MG/DL (ref 0.52–1.04)
ERYTHROCYTE [DISTWIDTH] IN BLOOD BY AUTOMATED COUNT: 12.7 % (ref 10–15)
FASTING STATUS PATIENT QL REPORTED: YES
FERRITIN SERPL-MCNC: 15 NG/ML (ref 8–252)
GFR SERPL CREATININE-BSD FRML MDRD: >90 ML/MIN/1.73M2
GLUCOSE BLD-MCNC: 95 MG/DL (ref 70–99)
HCT VFR BLD AUTO: 39.3 % (ref 35–47)
HDLC SERPL-MCNC: 71 MG/DL
HGB BLD-MCNC: 12.9 G/DL (ref 11.7–15.7)
LDLC SERPL CALC-MCNC: 81 MG/DL
MCH RBC QN AUTO: 30.9 PG (ref 26.5–33)
MCHC RBC AUTO-ENTMCNC: 32.8 G/DL (ref 31.5–36.5)
MCV RBC AUTO: 94 FL (ref 78–100)
NONHDLC SERPL-MCNC: 93 MG/DL
PLATELET # BLD AUTO: 244 10E3/UL (ref 150–450)
POTASSIUM BLD-SCNC: 4.7 MMOL/L (ref 3.4–5.3)
PROT SERPL-MCNC: 7.6 G/DL (ref 6.8–8.8)
RBC # BLD AUTO: 4.18 10E6/UL (ref 3.8–5.2)
SODIUM SERPL-SCNC: 138 MMOL/L (ref 133–144)
TRIGL SERPL-MCNC: 60 MG/DL
TSH SERPL DL<=0.005 MIU/L-ACNC: 1.24 MU/L (ref 0.4–4)
WBC # BLD AUTO: 6.2 10E3/UL (ref 4–11)

## 2023-01-24 PROCEDURE — 99213 OFFICE O/P EST LOW 20 MIN: CPT | Mod: 25 | Performed by: INTERNAL MEDICINE

## 2023-01-24 PROCEDURE — 82248 BILIRUBIN DIRECT: CPT | Performed by: INTERNAL MEDICINE

## 2023-01-24 PROCEDURE — 85027 COMPLETE CBC AUTOMATED: CPT | Performed by: INTERNAL MEDICINE

## 2023-01-24 PROCEDURE — 99396 PREV VISIT EST AGE 40-64: CPT | Performed by: INTERNAL MEDICINE

## 2023-01-24 PROCEDURE — 36415 COLL VENOUS BLD VENIPUNCTURE: CPT | Performed by: INTERNAL MEDICINE

## 2023-01-24 PROCEDURE — 82728 ASSAY OF FERRITIN: CPT | Performed by: INTERNAL MEDICINE

## 2023-01-24 PROCEDURE — 80053 COMPREHEN METABOLIC PANEL: CPT | Performed by: INTERNAL MEDICINE

## 2023-01-24 PROCEDURE — 84443 ASSAY THYROID STIM HORMONE: CPT | Performed by: INTERNAL MEDICINE

## 2023-01-24 PROCEDURE — 80061 LIPID PANEL: CPT | Performed by: INTERNAL MEDICINE

## 2023-01-24 PROCEDURE — 0124A COVID-19 VACCINE BIVALENT BOOSTER 12+ (PFIZER): CPT | Performed by: INTERNAL MEDICINE

## 2023-01-24 PROCEDURE — 91312 COVID-19 VACCINE BIVALENT BOOSTER 12+ (PFIZER): CPT | Performed by: INTERNAL MEDICINE

## 2023-01-24 ASSESSMENT — ENCOUNTER SYMPTOMS
ABDOMINAL PAIN: 1
ARTHRALGIAS: 1
FREQUENCY: 0
HEMATOCHEZIA: 0
HEADACHES: 0
DIARRHEA: 0
PARESTHESIAS: 0
SHORTNESS OF BREATH: 0
EYE PAIN: 0
NERVOUS/ANXIOUS: 0
NAUSEA: 0
COUGH: 0
DYSURIA: 0
BREAST MASS: 0
HEMATURIA: 0
CHILLS: 0
MYALGIAS: 0
SORE THROAT: 0
WEAKNESS: 0
HEARTBURN: 0
CONSTIPATION: 1
DIZZINESS: 0
JOINT SWELLING: 0
PALPITATIONS: 0
FEVER: 0

## 2023-01-24 ASSESSMENT — PATIENT HEALTH QUESTIONNAIRE - PHQ9: SUM OF ALL RESPONSES TO PHQ QUESTIONS 1-9: 2

## 2023-01-24 NOTE — PATIENT INSTRUCTIONS
Audiology::   A   will call you to coordinate your  appointment... . If you don t hear from a representative within 2 business days, please call [phone]    Schedule lab as needed for symptoms of the RUQ pain    Call to schedule imaging  OR 1 [phone]:   mammogram in summer.

## 2023-01-24 NOTE — PROGRESS NOTES
"   SUBJECTIVE:   CC: Shala is an 47 year old who presents for preventive health visit.   Patient has been advised of split billing requirements and indicates understanding: Yes  Healthy Habits:     Getting at least 3 servings of Calcium per day:  Yes    Bi-annual eye exam:  Yes    Dental care twice a year:  Yes    Sleep apnea or symptoms of sleep apnea:  None    Diet:  Vegetarian/vegan and Breakfast skipped    Frequency of exercise:  4-5 days/week    Duration of exercise:  45-60 minutes    Taking medications regularly:  Yes    PHQ-2 Total Score: 1    Additional concerns today:  Yes      46 y/o F here for AFE.  H/o MDD, EtOH, rosacea .     Of late, in perimenopause, had discussed option of ERT with her OBGyn.   Has had some abd discomfort, seen by surgery to consider grady:  but not direct evidence of biliary disease.  Her CT in fact showed incidental 'mild dilatation of the right urinary collecting system, with no definite cause identified. This finding is of uncertain clinical significance, and may be chronic.\"       Review of chart:  \"Gallbladder issues\":  Intermittent RUQ pain on and off for 10 Y.  Tests basically wnl... it occurs about 5x a year.   The pain will flare up for several days at a time:  Will continue to occur with eating for 3 days total.       Has \"perimenopause symptoms\".   Dry skin, weight gain.   Curious about her thryoid.     constipation, lab work   LMP 4 months.   Gained 24# in past 2 years.     Wants Thyroid checked due to weight gain.  Blood count due to veganism.   Recovery from EtOH for 10 Y.   Sober.      H/o pelvic pain, on gabapentin and this helped.      Today's PHQ-2 Score:   PHQ-2 ( 1999 [employer]) 1/22/2023   Q1: Little interest or pleasure in doing things 0   Q2: Feeling down, depressed or hopeless 1   PHQ-2 Score 1   PHQ-2 Total Score (12-17 Years)- Positive if 3 or more points; Administer PHQ-A if positive -   Q1: Little interest or pleasure in doing things Not at all   Q2: " Feeling down, depressed or hopeless Several days   PHQ-2 Score 1           Social History     Tobacco Use     Smoking status: Former     Packs/day: 1.50     Years: 15.00     Pack years: 22.50     Types: Cigarettes     Start date: 1/1/1994     Quit date: 10/1/2019     Years since quitting: 3.3     Smokeless tobacco: Never     Tobacco comments:     Stopped smoking cigarettes 12/18 but then started using E cigarette which I then quit using 2/19   Substance Use Topics     Alcohol use: Not Currently     Comment: Recovering alcoholic, sobriety date 3/21/2012     If you drink alcohol do you typically have >3 drinks per day or >7 drinks per week? No    Alcohol Use 1/22/2023   Prescreen: >3 drinks/day or >7 drinks/week? No   Prescreen: >3 drinks/day or >7 drinks/week? -       Reviewed orders with patient.  Reviewed health maintenance and updated orders accordingly - Yes  Lab work is in process  Labs reviewed in EPIC  BP Readings from Last 3 Encounters:   01/24/23 121/79   01/17/23 129/76   02/21/22 116/76    Wt Readings from Last 3 Encounters:   01/24/23 88 kg (194 lb)   01/17/23 88.6 kg (195 lb 6.4 oz)   01/19/22 84.5 kg (186 lb 3.2 oz)                  Patient Active Problem List   Diagnosis     HPV in female     Symptomatic menopausal or female climacteric states     Biliary sludge determined by ultrasound     History of hypertension     History of depression     History of alcoholism (H)     Intermittent right upper quadrant abdominal pain     Lung granuloma (H)     Vegan diet     Hyperlipidemia LDL goal <130     Past Surgical History:   Procedure Laterality Date     BIOPSY  4/2018    Cervical biopsy     COLONOSCOPY N/A 2/21/2022    Procedure: COLONOSCOPY, FLEXIBLE, WITH LESION REMOVAL USING SNARE;  Surgeon: Samson Menjivar MD;  Location:  OR     COLONOSCOPY WITH CO2 INSUFFLATION N/A 2/21/2022    Procedure: COLONOSCOPY, WITH CO2 INSUFFLATION;  Surgeon: Samson Menjivar MD;  Location:  OR       Social  History     Tobacco Use     Smoking status: Former     Packs/day: 1.50     Years: 15.00     Pack years: 22.50     Types: Cigarettes     Start date: 1/1/1994     Quit date: 10/1/2019     Years since quitting: 3.3     Smokeless tobacco: Never     Tobacco comments:     Stopped smoking cigarettes 12/18 but then started using E cigarette which I then quit using 2/19   Substance Use Topics     Alcohol use: Not Currently     Comment: Recovering alcoholic, sobriety date 3/21/2012     Family History   Problem Relation Age of Onset     Thyroid Disease Mother      Hypertension Mother      Diabetes Father      Hypertension Father      Depression Father      Mental Illness Father      Obesity Father      Thyroid Disease Maternal Grandmother      Hypertension Maternal Grandmother      Hypertension Maternal Grandfather      Hypertension Paternal Grandmother      Diabetes Paternal Grandfather      Prostate Cancer Paternal Grandfather      Hypertension Paternal Grandfather          Current Outpatient Medications   Medication Sig Dispense Refill     azelaic acid (FINACIA) 15 % external gel Apply 1 g topically 2 times daily       crisaborole (EUCRISA) 2 % ointment Apply topically 2 times daily       Cyanocobalamin (VITAMIN B 12 PO)        diazepam 10 mg SUPP Place vaginally or rectally daily as needed. 10 suppository 3     DULoxetine (CYMBALTA) 60 MG capsule Take 1 capsule (60 mg) by mouth At Bedtime 90 capsule 3     estradiol (ESTRACE) 0.1 MG/GM vaginal cream APPLY SMALL AMOUNT TO AFFECTED AREA DAILY FOR 2 WEEKS       gabapentin (NEURONTIN) 600 MG tablet Take 1 tablet (600 mg) by mouth 2 times daily 120 tablet 5     lidocaine (XYLOCAINE) 2 % external gel APPLY A SMALL AMOUNT TO THE AFFECTED AREA TOPICALLY TWICE DAILY 30 mL 11     metroNIDAZOLE (METROCREAM) 0.75 % external cream APPLY TO THE FACE TWICE DAILY       vitamin D3 (CHOLECALCIFEROL) 2000 units (50 mcg) tablet Take 1 tablet by mouth daily       No Known Allergies  Recent Labs    Lab Test 01/19/22  0946 12/23/19  1116 04/22/19  1733   LDL 84  --  60   HDL 79  --  55   TRIG 36  --  108   TSH 1.23 1.08 1.36        Breast Cancer Screening:    FHS-7:   Breast CA Risk Assessment (FHS-7) 7/15/2021 1/18/2022 8/8/2022 1/22/2023   Did any of your first-degree relatives have breast or ovarian cancer? No No No No   Did any of your relatives have bilateral breast cancer? No No No No   Did any man in your family have breast cancer? No No No No   Did any woman in your family have breast and ovarian cancer? No No No No   Did any woman in your family have breast cancer before age 50 y? No No No No   Do you have 2 or more relatives with breast and/or ovarian cancer? No No No No   Do you have 2 or more relatives with breast and/or bowel cancer? No No No No       Mammogram Screening: Recommended annual mammography  Pertinent mammograms are reviewed under the imaging tab.    History of abnormal Pap smear:  Sees outside obgyn     Reviewed and updated as needed this visit by clinical staff                  Reviewed and updated as needed this visit by Provider                 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     HTN (hypertension)      Motion sickness       Past Surgical History:   Procedure Laterality Date     BIOPSY  4/2018    Cervical biopsy     COLONOSCOPY N/A 2/21/2022    Procedure: COLONOSCOPY, FLEXIBLE, WITH LESION REMOVAL USING SNARE;  Surgeon: Samson Menjivar MD;  Location: [facility] OR     COLONOSCOPY WITH CO2 INSUFFLATION N/A 2/21/2022    Procedure: COLONOSCOPY, WITH CO2 INSUFFLATION;  Surgeon: Samson Menjivar MD;  Location: [facility] OR       Review of Systems   Constitutional: Negative for chills and fever.   HENT: Negative for congestion, ear pain, hearing loss and sore throat.    Eyes: Negative for pain and visual disturbance.  "  Respiratory: Negative for cough and shortness of breath.    Cardiovascular: Negative for chest pain, palpitations and peripheral edema.   Gastrointestinal: Positive for abdominal pain and constipation. Negative for diarrhea, heartburn, hematochezia and nausea.   Breasts:  Negative for tenderness, breast mass and discharge.   Genitourinary: Negative for dysuria, frequency, genital sores, hematuria, pelvic pain, urgency, vaginal bleeding and vaginal discharge.   Musculoskeletal: Positive for arthralgias. Negative for joint swelling and myalgias.   Skin: Negative for rash.   Neurological: Negative for dizziness, weakness, headaches and paresthesias.   Psychiatric/Behavioral: Negative for mood changes. The patient is not nervous/anxious.            OBJECTIVE:   /79 (BP Location: Right arm, Patient Position: Sitting, Cuff Size: Adult Regular)   Pulse 67   Temp 98.4  F (36.9  C) (Oral)   Ht 1.746 m (5' 8.75\")   Wt 88 kg (194 lb)   SpO2 99%   BMI 28.86 kg/m    Physical Exam  GENERAL APPEARANCE: healthy, alert and no distress  EYES: Eyes grossly normal to inspection, PERRL and conjunctivae and sclerae normal  HENT: ear canals and TM's normal, nose and mouth without ulcers or lesions, oropharynx clear and oral mucous membranes moist  NECK: no adenopathy, no asymmetry, masses, or scars and thyroid normal to palpation  RESP: lungs clear to auscultation - no rales, rhonchi or wheezes  BREAST: normal without masses, tenderness or nipple discharge and no palpable axillary masses or adenopathy  CV: regular rate and rhythm, normal S1 S2, no S3 or S4, no murmur, click or rub, no peripheral edema and peripheral pulses strong  ABDOMEN: soft, nontender, no hepatosplenomegaly, no masses and bowel sounds normal   (female): deferred   MS: no musculoskeletal defects are noted and gait is age appropriate without ataxia  SKIN: no suspicious lesions or rashes  NEURO: Normal strength and tone, sensory exam grossly normal, " mentation intact and speech normal  PSYCH: mentation appears normal and affect normal/bright    Diagnostic Test Results:  Labs reviewed in Epic  See orders    ASSESSMENT/PLAN:   (Z00.01) Encounter for general adult medical examination with abnormal findings  (primary encounter diagnosis)  Comment:  In good health.  May be struggling with intermittent GB dz but no objective findings.    Plan:      (E78.5) Hyperlipidemia LDL goal <130  Comment:  chek   Plan: Lipid panel reflex to direct LDL Fasting             (J84.10) Lung granuloma (H)  Comment: benign.  Noted    Plan:      (Z78.9) Vegan diet  Comment:  Requests eval for anemia.   She has a hard time taking oral iron.  Was able to take vitron c   Plan: CBC with platelets, Ferritin, Comprehensive         metabolic panel (BMP + Alb, Alk Phos, ALT, AST,        Total. Bili, TP)             (R10.11) Intermittent right upper quadrant abdominal pain  Comment:  Likely GB intermittent colick.     Plan: check LFTs PRN symptoms.   Trying to  Get objective finding.     (Z23) High priority for 2019-nCoV vaccine  Comment:    Plan: COVID-19,PF,PFIZER BOOSTER BIVALENT 12+Yrs             (R63.5) Weight gain  Comment:  Due to perimenopause likely.  Also quit smoking 3Y ago.  Discussed change in calorie burning in post menopausal women.  Discussed calories and I&O's     Plan: TSH with free T4 reflex             (R10.11) RUQ abdominal pain  Comment:  See above.   Likely GB attacks at intervals.  No objective findings during labs/imaging in past.  She will check LFTs prn attack    Plan: Comprehensive metabolic panel (BMP + Alb, Alk         Phos, ALT, AST, Total. Bili, TP), Hepatic panel        (Albumin, ALT, AST, Bili, Alk Phos, TP)             (Z12.31) Encounter for screening mammogram for breast cancer  Comment:    Plan: *MA Screening Digital Bilateral             (H90.5) Sensorineural hearing loss (SNHL) of left ear, unspecified hearing status on contralateral side  Comment:  Notes L  "sided HL.    Plan: Adult Audiology  Referral             (R10.2) Pelvic pain in female  Comment:  Deemed nerve pain per past w/u   Plan: continue current management cymbalta and gabapentin, this has really helped.            COUNSELING:  Reviewed preventive health counseling, as reflected in patient instructions       Regular exercise      BMI:   Estimated body mass index is 29.28 kg/m  as calculated from the following:    Height as of 1/17/23: 1.74 m (5' 8.5\").    Weight as of 1/17/23: 88.6 kg (195 lb 6.4 oz).         She reports that she quit smoking about 3 years ago. Her smoking use included cigarettes. She started smoking about 29 years ago. She has a 22.50 pack-year smoking history. She has never used smokeless tobacco.          Mariluz Potter MD  [facility]  "

## 2023-01-25 NOTE — RESULT ENCOUNTER NOTE
Shala Morales    Electrolytes, kidney function, electrolytes are within normal.  Blood count normal.  Cholesterol looks great.     Sincerely,       ASHLEIGH OCHOA M.D.

## 2023-01-31 ENCOUNTER — DOCUMENTATION ONLY (OUTPATIENT)
Dept: GASTROENTEROLOGY | Facility: CLINIC | Age: 48
End: 2023-01-31
Payer: COMMERCIAL

## 2023-01-31 NOTE — PROGRESS NOTES
PFC order, demographics, last office note was faxed to the [facility] at [phone]. Order scanned into chart.     Patient is scheduled, on 3/31/23 at 10:30am.    PFC report received sent to HIM to be scanned into patient chart.       Ashlee Mckoy LPN

## 2023-02-14 ENCOUNTER — OFFICE VISIT (OUTPATIENT)
Dept: ORTHOPEDICS | Facility: CLINIC | Age: 48
End: 2023-02-14
Payer: COMMERCIAL

## 2023-02-14 VITALS — BODY MASS INDEX: 28.86 KG/M2 | WEIGHT: 194 LBS | DIASTOLIC BLOOD PRESSURE: 85 MMHG | SYSTOLIC BLOOD PRESSURE: 144 MMHG

## 2023-02-14 DIAGNOSIS — M54.16 RIGHT LUMBAR RADICULOPATHY: Primary | ICD-10-CM

## 2023-02-14 PROCEDURE — 99213 OFFICE O/P EST LOW 20 MIN: CPT | Performed by: FAMILY MEDICINE

## 2023-02-14 NOTE — PATIENT INSTRUCTIONS
# Acute on Chronic Right Low Back Pain with Radiculopathy: Shala Morales  was seen today for acute on chronic low back pain. Symptoms had been going on for 2 weeks has had this for several years with recent flare up. On examination there are positive findings of pain worse with stretching sciatic nerve. Imaging findings showed no imaging today. Likely cause of patient's condition due to flare of right low back pain with right sided sciatica. Counseled patient on nature of condition and treatment options.  Given this plan as below, follow-up as needed in one month     Image Findings: none today  Treatment: Activities as tolerated, home exercises given today, PT referral placed  Job: As tolerated  Medications/Injections: Limited tylenol/ibuprofen for pain for 1-2 weeks, none  Follow-up: In one month if symptoms do not improve, sooner if worsening  Can consider further evaluation, imaging/medications    Please call [phone]   Ask for my team if you have any questions or concerns    If you have not yet received the influenza vaccine but would like to get one, please call  [phone] or you can schedule via [employer]    It was great seeing you again today!    Wally Morillo MD, CAQSM

## 2023-02-14 NOTE — LETTER
2/14/2023         RE: Shala Morales  [address] [address]        Dear Colleague,    Thank you for referring your patient, Shala Morales, to the [facility]. Please see a copy of my visit note below.    ASSESSMENT & PLAN  Shala was seen today for pain.    Diagnoses and all orders for this visit:    Right lumbar radiculopathy  -     Physical Therapy Referral; Future      Patient is a 47 year old female presenting for evaluation of low back pain     # Acute on Chronic Right Low Back Pain with Radiculopathy: Shala Morales  was seen today for acute on chronic low back pain. Symptoms had been going on for 2 weeks has had this for several years with recent flare up. On examination there are positive findings of pain worse with stretching sciatic nerve. Imaging findings showed no imaging today. Likely cause of patient's condition due to flare of right low back pain with right sided sciatica. Counseled patient on nature of condition and treatment options.  Given this plan as below, follow-up as needed in one month     Image Findings: none today  Treatment: Activities as tolerated, home exercises given today, PT referral placed  Job: As tolerated  Medications/Injections: Limited tylenol/ibuprofen for pain for 1-2 weeks, none  Follow-up: In one month if symptoms do not improve, sooner if worsening  Can consider further evaluation, imaging/medications    Return in about 1 month (around 3/14/2023), or if symptoms worsen or fail to improve.  -----    SUBJECTIVE  Shala Morales is a/an 47 year old female who is seen in consultation at the request of  Mariluz Potter M.D. for evaluation of acute on chronic low back pain, and right leg pain. The patient is seen by themselves.    Onset: 2 week(s) ago. Reports insidious onset without acute precipitating event.  Location of Pain: right leg and low back  Rating of Pain at worst: 6/10  Rating of Pain  Currently: 4/10  Worsened by: standing, walking  Better with: sitting,   Treatments tried: ice, heat  Quality: cramping, numbness  Red flags: Weakness: No, bowel/bladder loss: No, foot drop: No  Associated symptoms: tingling, numbness  Orthopedic history: NO  Relevant surgical history: NO  Social: nurse    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     HTN (hypertension)      Motion sickness      Social History     Socioeconomic History     Marital status:    Tobacco Use     Smoking status: Former     Packs/day: 1.50     Years: 15.00     Pack years: 22.50     Types: Cigarettes     Start date: 1/1/1994     Quit date: 10/1/2019     Years since quitting: 3.3     Smokeless tobacco: Never     Tobacco comments:     Stopped smoking cigarettes 12/18 but then started using E cigarette which I then quit using 2/19   Vaping Use     Vaping Use: Never used   Substance and Sexual Activity     Alcohol use: Not Currently     Comment: Recovering alcoholic, sobriety date 3/21/2012     Drug use: No     Sexual activity: Not Currently     Partners: Male     Birth control/protection: Condom   Other Topics Concern     Parent/sibling w/ CABG, MI or angioplasty before 65F 55M? No         Patient's past medical, surgical, social, and family histories were reviewed today and no changes are noted.    REVIEW OF SYSTEMS:  10 point ROS is negative other than symptoms noted above in HPI, Past Medical History or as stated below  Constitutional: NEGATIVE for fever, chills, change in weight  Skin: NEGATIVE for worrisome rashes, moles or lesions  GI/: NEGATIVE for bowel or bladder changes  Neuro: NEGATIVE for weakness, dizziness or paresthesias    OBJECTIVE:  BP (!) 144/85   Wt 88 kg (194 lb)   BMI 28.86 kg/m     General: healthy, alert and in no distress  HEENT: no scleral icterus  or conjunctival erythema  Skin: no suspicious lesions or rash. No jaundice.  CV: no pedal edema  Resp: normal respiratory effort without conversational dyspnea   Psych: normal mood and affect  Gait: normal steady gait with appropriate coordination and balance  Neuro: normal light touch sensory exam of the bilateral lower extremities.  DTR's  2+ patella and achilles bilaterally.  MSK:  THORACIC/LUMBAR SPINE  Inspection:    No gross deformity/asymmetry  Palpation:    Tender about the right SI joint and right sciatic notch. Otherwise remainder of landmarks are nontender.  Range of Motion:     Lumbar flexion limited slightly by pain    Lumbar extension limited slightly by pain  Strength:    5/5 - quadriceps, hamstrings, tibialis anterior, gastrocsoleus, and extensor hallicus longus  Special Tests:    Positive: straight leg raise (right), slump test (right)    Negative: straight leg raise (left), slump test (left)    BILATERAL HIP  Inspection:    No obvious deformity or asymmetry, pelvis level  Palpation:  Nontender.  Active Range of Motion:     Flexion full, IR full, ER  full  Strength:    Flexion 5/5, adduction 5/5, abduction 5/5  Special Tests:    Positive: None    Negative: Logroll, BAO, anterior impingement (FADIR)    Independent visualization of the below image:  No results found for this or any previous visit (from the past 24 hour(s)).          Wally Morillo MD [facility]    Disclaimer: This note consists of symbols derived from keyboarding, dictation and/or voice recognition software. As a result, there may be errors in the script that have gone undetected. Please consider this when interpreting information found in this chart.          Again, thank you for allowing me to participate in the care of your patient.        Sincerely,        Wally Morillo MD

## 2023-02-14 NOTE — PROGRESS NOTES
ASSESSMENT & PLAN  Shala was seen today for pain.    Diagnoses and all orders for this visit:    Right lumbar radiculopathy  -     Physical Therapy Referral; Future      Patient is a 47 year old female presenting for evaluation of low back pain     # Acute on Chronic Right Low Back Pain with Radiculopathy: Shala Morales  was seen today for acute on chronic low back pain. Symptoms had been going on for 2 weeks has had this for several years with recent flare up. On examination there are positive findings of pain worse with stretching sciatic nerve. Imaging findings showed no imaging today. Likely cause of patient's condition due to flare of right low back pain with right sided sciatica. Counseled patient on nature of condition and treatment options.  Given this plan as below, follow-up as needed in one month     Image Findings: none today  Treatment: Activities as tolerated, home exercises given today, PT referral placed  Job: As tolerated  Medications/Injections: Limited tylenol/ibuprofen for pain for 1-2 weeks, none  Follow-up: In one month if symptoms do not improve, sooner if worsening  Can consider further evaluation, imaging/medications    Return in about 1 month (around 3/14/2023), or if symptoms worsen or fail to improve.  -----    SUBJECTIVE  Shala Morales is a/an 47 year old female who is seen in consultation at the request of  Mariluz Potter M.D. for evaluation of acute on chronic low back pain, and right leg pain. The patient is seen by themselves.    Onset: 2 week(s) ago. Reports insidious onset without acute precipitating event.  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     HTN (hypertension)      Motion sickness      Social History     Socioeconomic History     Marital status:    Tobacco Use     Smoking status: Former     Packs/day: 1.50     Years: 15.00     Pack years: 22.50     Types: Cigarettes     Start date: 1/1/1994     Quit date: 10/1/2019     Years since quitting: 3.3     Smokeless tobacco: Never     Tobacco comments:     Stopped smoking cigarettes 12/18 but then started using E cigarette which I then quit using 2/19   Vaping Use     Vaping Use: Never used   Substance and Sexual Activity     Alcohol use: Not Currently     Comment: Recovering alcoholic, sobriety date 3/21/2012     Drug use: No     Sexual activity: Not Currently     Partners: Male     Birth control/protection: Condom   Other Topics Concern     Parent/sibling w/ CABG, MI or angioplasty before 65F 55M? No         Patient's past medical, surgical, social, and family histories were reviewed today and no changes are noted.    REVIEW OF SYSTEMS:  10 point ROS is negative other than symptoms noted above in HPI, Past Medical History or as stated below  Constitutional: NEGATIVE for fever, chills, change in weight  Skin: NEGATIVE for worrisome rashes, moles or lesions  GI/: NEGATIVE for bowel or bladder changes  Neuro: NEGATIVE for weakness, dizziness or paresthesias    OBJECTIVE:  BP (!) 144/85   Wt 88 kg (194 lb)   BMI 28.86 kg/m     General: healthy, alert and in no distress  HEENT: no scleral icterus or conjunctival erythema  Skin: no suspicious lesions or rash. No jaundice.  CV: no pedal edema  Resp: normal respiratory effort without conversational dyspnea   Psych: normal mood and affect  Gait: normal steady gait with appropriate coordination and balance  Neuro: normal  light touch sensory exam of the bilateral lower extremities.  DTR's  2+ patella and achilles bilaterally.  MSK:  THORACIC/LUMBAR SPINE  Inspection:    No gross deformity/asymmetry  Palpation:    Tender about the right SI joint and right sciatic notch. Otherwise remainder of landmarks are nontender.  Range of Motion:     Lumbar flexion limited slightly by pain    Lumbar extension limited slightly by pain  Strength:    5/5 - quadriceps, hamstrings, tibialis anterior, gastrocsoleus, and extensor hallicus longus  Special Tests:    Positive: straight leg raise (right), slump test (right)    Negative: straight leg raise (left), slump test (left)    BILATERAL HIP  Inspection:    No obvious deformity or asymmetry, pelvis level  Palpation:  Nontender.  Active Range of Motion:     Flexion full, IR full, ER  full  Strength:    Flexion 5/5, adduction 5/5, abduction 5/5  Special Tests:    Positive: None    Negative: Logroll, BAO, anterior impingement (FADIR)    Independent visualization of the below image:  No results found for this or any previous visit (from the past 24 hour(s)).          Wally Morillo MD [facility]    Disclaimer: This note consists of symbols derived from keyboarding, dictation and/or voice recognition software. As a result, there may be errors in the script that have gone undetected. Please consider this when interpreting information found in this chart.       [As Noted in HPI] : as noted in HPI [Negative] : Heme/Lymph [Joint Pain] : joint pain

## 2023-02-21 ENCOUNTER — THERAPY VISIT (OUTPATIENT)
Dept: PHYSICAL THERAPY | Facility: CLINIC | Age: 48
End: 2023-02-21
Attending: FAMILY MEDICINE
Payer: COMMERCIAL

## 2023-02-21 DIAGNOSIS — M54.16 LUMBAR RADICULOPATHY: ICD-10-CM

## 2023-02-21 DIAGNOSIS — M54.16 RIGHT LUMBAR RADICULOPATHY: ICD-10-CM

## 2023-02-21 PROCEDURE — 97110 THERAPEUTIC EXERCISES: CPT | Mod: GP | Performed by: PHYSICAL THERAPIST

## 2023-02-21 PROCEDURE — 97161 PT EVAL LOW COMPLEX 20 MIN: CPT | Mod: GP | Performed by: PHYSICAL THERAPIST

## 2023-02-21 NOTE — PROGRESS NOTES
Physical Therapy Initial Evaluation  Subjective:  The history is provided by the patient. No  was used.   Therapist Generated HPI Evaluation  Problem details: Pt reports that early Feb 2023 noticed sudden onset of sciatic pain on the R. The pain started as R LBP and into the hip (muscle cramping)  Thought maybe it was from water aerobics. Hx of recurrent LBP on/off for three years. .         Type of problem:  Lumbar.    This is a recurrent condition.  Condition occurred with:  Insidious onset.  Where condition occurred: at home.  Patient reports pain:  Lumbar spine right and lumbar spine left.  Pain is described as aching and is intermittent.  Pain radiates to:  Gluteals left and lower leg right (no symptoms ever in thigh). Pain is worse in the A.M. and worse in the P.M..  Since onset symptoms are gradually improving.  Associated symptoms:  Loss of motion/stiffness. Symptoms are exacerbated by walking, certain positions, sitting and standing (sitting longer than 30 min, pretzel stretch, hamstring stretch in standing)  and relieved by activity/movement, ice and heat.      Restrictions due to condition include:  Working in normal job without restrictions.  Barriers include:  None as reported by patient.    Patient Health History         Pain is reported as 5/10 on pain scale.  General health as reported by patient is good.  Pertinent medical history includes: chemical dependency, high blood pressure, depression and other (female pelvic floor dysfunction).   Red flags:  None as reported by patient.  Medical allergies: none.   Surgeries include:  None.    Current medications:  Hormone replacement therapy, anti-seizure medication and anti-depressants (anti-depressants used for pain, anti-seizure mediation used for pain, topical rosacea cream).    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   Primary job tasks include:  Prolonged sitting, driving, prolonged standing, pushing/pulling, lifting/carrying and  repetitive tasks.                                    Objective:  Standing Alignment:        Lumbar:  Lordosis decr                           Lumbar/SI Evaluation    Lumbar Myotomes:  normal                Lumbar Dermtomes:  not assessed                                                                         Rudy Lumbar Evaluation    Posture:  Sitting: fair    Lordosis: Reduced    Correction of Posture: better    Movement Loss:  Flexion (Flex): nil and pain  Extension (EXT): min and pain  Side [place] R (SG R): nil and pain  Side Glide L (SG L): nil  Test Movements:        EIL: During: no effect  After: no effect  Mechanical Response: no effect  Repeat EIL: During: produces  After: no worse  Mechanical Response: IncROM        Conclusion: derangement  Principle of Treatment:  Posture Correction: lumbar support    Extension: EIL 10-15 reps every 2-3 hrs                                           ROS    Assessment/Plan:    Patient is a 47 year old female with lumbar complaints.    Patient has the following significant findings with corresponding treatment plan.                Diagnosis 1:  R lumbar below knee derangement  Pain -  manual therapy, self management, education, directional preference exercise and home program  Decreased ROM/flexibility - manual therapy, therapeutic exercise, therapeutic activity and home program  Inflammation - self management/home program  Decreased function - therapeutic activities and home program  Impaired posture - neuro re-education, therapeutic activities and home program    .  1) Decision-Making    Low complexity using standardized patient assessment instrument and/or measureable assessment of functional outcome.  Cumulative Therapy Evaluation is: Low complexity.    Previous and current functional limitations:  (See Goal Flow Sheet for this information)    Short term and Long term goals: (See Goal Flow Sheet for this information)     Communication ability:  Patient appears to be  able to clearly communicate and understand verbal and written communication and follow directions correctly.  Treatment Explanation - The following has been discussed with the patient:   RX ordered/plan of care  Anticipated outcomes  Possible risks and side effects  This patient would benefit from PT intervention to resume normal activities.   Rehab potential is excellent.    Frequency:  1 X week, once daily  Duration:  for 8 weeks  Discharge Plan:  Achieve all LTG.  Independent in home treatment program.  Reach maximal therapeutic benefit.    Please refer to the daily flowsheet for treatment today, total treatment time and time spent performing 1:1 timed codes.

## 2023-02-28 ENCOUNTER — THERAPY VISIT (OUTPATIENT)
Dept: PHYSICAL THERAPY | Facility: CLINIC | Age: 48
End: 2023-02-28
Attending: FAMILY MEDICINE
Payer: COMMERCIAL

## 2023-02-28 DIAGNOSIS — M54.16 LUMBAR RADICULOPATHY: Primary | ICD-10-CM

## 2023-02-28 PROCEDURE — 97110 THERAPEUTIC EXERCISES: CPT | Mod: GP | Performed by: PHYSICAL THERAPIST

## 2023-02-28 PROCEDURE — 97530 THERAPEUTIC ACTIVITIES: CPT | Mod: GP | Performed by: PHYSICAL THERAPIST

## 2023-02-28 NOTE — PROGRESS NOTES
Subjective:  HPI  Physical Exam                    Objective:  System    Physical Exam    General     ROS    Assessment/Plan:    SUBJECTIVE  Subjective changes as noted by pt:  At least 5x/day of pressups, only noticeable difference is numbness below the knee has turned into more of a tingling. Symptoms in sitting take longer to come on.        Current pain level: 4/10     Changes in function:  Yes (See Goal flowsheet attached for changes in current functional level)     Adverse reaction to treatment or activity:  None    OBJECTIVE  Changes in objective findings:  Yes, See physical exam section and/or daily flowsheet for response to repeated movements.           ASSESSMENT  Shala continues to require intervention to meet STG and LTG's: PT  No change of symptoms has been noted.  Response to therapy has shown lack of progress in  pain level and radicular symptoms  Progress made towards STG/LTG?  Yes (See Goal flowsheet attached for updates on achievement of STG and LTG)    PLAN  Current treatment program is being advanced to more complex exercises.    PTA/ATC plan:  N/A    Please refer to the daily flowsheet for treatment today, total treatment time and time spent performing 1:1 timed codes.

## 2023-03-01 ENCOUNTER — MEDICAL CORRESPONDENCE (OUTPATIENT)
Dept: HEALTH INFORMATION MANAGEMENT | Facility: CLINIC | Age: 48
End: 2023-03-01
Payer: COMMERCIAL

## 2023-03-06 NOTE — PROGRESS NOTES
Subjective:  HPI  Physical Exam                    Objective:  System    Physical Exam    General     ROS    Assessment/Plan:    SUBJECTIVE  Subjective changes as noted by pt:  The week has gone well. Noticing improvement. Less pain with movement, not noticing it as much throughout the day. Sleep is better. Gaining more sensation back in her great toe. Performing the pressups at least 5x/day.        Current pain level: 2/10     Changes in function:  Yes (See Goal flowsheet attached for changes in current functional level)     Adverse reaction to treatment or activity:  None    OBJECTIVE  Changes in objective findings:  Yes, See physical exam section and/or daily flowsheet for response to repeated movements.           ASSESSMENT  Shala continues to require intervention to meet STG and LTG's: PT  Patient's symptoms are resolving.  Response to therapy has shown an improvement in  pain level and function  Progress made towards STG/LTG?  Yes (See Goal flowsheet attached for updates on achievement of STG and LTG)    PLAN  Continue current treatment plan until patient demonstrates readiness to progress to higher level exercises.    PTA/ATC plan:  N/A    Please refer to the daily flowsheet for treatment today, total treatment time and time spent performing 1:1 timed codes.

## 2023-03-07 ENCOUNTER — THERAPY VISIT (OUTPATIENT)
Dept: PHYSICAL THERAPY | Facility: CLINIC | Age: 48
End: 2023-03-07
Attending: FAMILY MEDICINE
Payer: COMMERCIAL

## 2023-03-07 DIAGNOSIS — M54.16 LUMBAR RADICULOPATHY: Primary | ICD-10-CM

## 2023-03-07 PROCEDURE — 97530 THERAPEUTIC ACTIVITIES: CPT | Mod: GP | Performed by: PHYSICAL THERAPIST

## 2023-03-07 PROCEDURE — 97110 THERAPEUTIC EXERCISES: CPT | Mod: GP | Performed by: PHYSICAL THERAPIST

## 2023-03-15 ENCOUNTER — TELEPHONE (OUTPATIENT)
Dept: GASTROENTEROLOGY | Facility: CLINIC | Age: 48
End: 2023-03-15
Payer: COMMERCIAL

## 2023-03-17 NOTE — TELEPHONE ENCOUNTER
LVMx2, sent mychart x2. Informed pt that the appt will be cancelled. Left call center # to reschedule

## 2023-03-31 ENCOUNTER — TRANSFERRED RECORDS (OUTPATIENT)
Dept: HEALTH INFORMATION MANAGEMENT | Facility: CLINIC | Age: 48
End: 2023-03-31
Payer: COMMERCIAL

## 2023-05-08 PROBLEM — M54.16 LUMBAR RADICULOPATHY: Status: RESOLVED | Noted: 2023-02-21 | Resolved: 2023-05-08

## 2023-05-08 NOTE — PROGRESS NOTES
Discharge Note    Progress reporting period is from initial evaluation date (please see noted date below) to Mar 7, 2023.  No linked episodes      Shala failed to follow up and current status is unknown.  Please see information below for last relevant information on current status.  Patient seen for 3 visits.    SUBJECTIVE  Subjective changes noted by patient:     .  Current pain level is  .     Previous pain level was   .   Changes in function:  Yes (See Goal flowsheet attached for changes in current functional level)  Adverse reaction to treatment or activity: None    OBJECTIVE  Changes noted in objective findings:       ASSESSMENT/PLAN  Diagnosis: R lumbar below knee derangement   Updated problem list and treatment plan:   Pain - HEP  Decreased ROM/flexibility - HEP  Decreased function - HEP  Decreased strength - HEP  STG/LTGs have been met or progress has been made towards goals:  Yes, please see goal flowsheet for most current information  Assessment of Progress: current status is unknown.    Last current status:     Self Management Plans:  HEP  I have re-evaluated this patient and find that the nature, scope, duration and intensity of the therapy is appropriate for the medical condition of the patient.  Shala continues to require the following intervention to meet STG and LTG's:  HEP.    Recommendations:  Discharge with current home program.  Patient to follow up with MD as needed.    Please refer to the daily flowsheet for treatment today, total treatment time and time spent performing 1:1 timed codes.

## 2023-07-18 ENCOUNTER — MYC MEDICAL ADVICE (OUTPATIENT)
Dept: INTERNAL MEDICINE | Facility: CLINIC | Age: 48
End: 2023-07-18
Payer: COMMERCIAL

## 2023-07-18 DIAGNOSIS — R63.5 WEIGHT GAIN: Primary | ICD-10-CM

## 2023-07-24 ENCOUNTER — TELEPHONE (OUTPATIENT)
Dept: ENDOCRINOLOGY | Facility: CLINIC | Age: 48
End: 2023-07-24
Payer: COMMERCIAL

## 2023-07-24 NOTE — TELEPHONE ENCOUNTER
[facility] Call Center    Phone Message    May a detailed message be left on voicemail: yes     Reason for Call: Appointment Intake    Referring Provider Name: Mariluz Potter M    Diagnosis and/or Symptoms: Thyroid    Currently schedule 01/16/23 @330 pm. Sending TE per scheduling protocol. Pt prefer Monday or Tuesdays appt. Please and thank you!    Action Taken: Message routed to:  [facility] ([facility] ENDO    Travel Screening: Not Applicable

## 2023-07-25 NOTE — TELEPHONE ENCOUNTER
FIrst available appropriate with normal thyroid level per protocol.Mary Martell RN on 7/24/2023 at 7:26 PM

## 2023-07-27 ENCOUNTER — MYC MEDICAL ADVICE (OUTPATIENT)
Dept: ORTHOPEDICS | Facility: CLINIC | Age: 48
End: 2023-07-27
Payer: COMMERCIAL

## 2023-07-27 DIAGNOSIS — M54.16 RIGHT LUMBAR RADICULOPATHY: Primary | ICD-10-CM

## 2023-07-28 RX ORDER — METHYLPREDNISOLONE 4 MG
TABLET, DOSE PACK ORAL
Qty: 21 TABLET | Refills: 0 | Status: SHIPPED | OUTPATIENT
Start: 2023-07-28 | End: 2023-09-26

## 2023-07-28 NOTE — CONFIDENTIAL NOTE
Messaged patient via [facility] plan to operate Medrol Dosepak and follow-up in clinic next available worsening.  Can consider lumbar x-ray and further imaging evaluation.  Advised patient that oral steroids would help lumbar radiculopathy as well as possible planter fasciitis.

## 2023-08-08 ENCOUNTER — VIRTUAL VISIT (OUTPATIENT)
Dept: NEUROLOGY | Facility: CLINIC | Age: 48
End: 2023-08-08
Payer: COMMERCIAL

## 2023-08-08 DIAGNOSIS — R20.2 PARESTHESIAS: Primary | ICD-10-CM

## 2023-08-08 DIAGNOSIS — M54.16 LUMBAR RADICULOPATHY: ICD-10-CM

## 2023-08-08 PROCEDURE — 99214 OFFICE O/P EST MOD 30 MIN: CPT | Mod: VID | Performed by: PSYCHIATRY & NEUROLOGY

## 2023-08-08 RX ORDER — DULOXETIN HYDROCHLORIDE 60 MG/1
60 CAPSULE, DELAYED RELEASE ORAL AT BEDTIME
Qty: 90 CAPSULE | Refills: 2 | Status: SHIPPED | OUTPATIENT
Start: 2023-08-08

## 2023-08-08 NOTE — PROGRESS NOTES
[facility] Neurology Follow Up Visit    Shala Morales MRN# [id number]   Age: 48 year old YOB: 1975     Brief history of symptoms: The patient was initially seen in neurologic consultation on 4/9/2021 and most recently 10/14/2021 ant 6/10/2021 for evaluation of peripheral pelvic pain. Please see the comprehensive neurologic consultation notes from those dates in the Epic records for details.     Prior evaluations which were pertinent:  - Normal EMG 7/13/2021  - MRI lumbar spine 5/1/2021 with some mild neuroforaminal narrowing and spondylosis at L3-S1.  - Serum studies were normal 6/15/2021    Symptoms described on our last visit were that of deep vaginal pain, mildly responsive to pelvic floor therapies.  It was recommended she trial neuropathic agents, and be seen with OBGYN and Pain clinic.    Interval history:   - Seen with pain management 2/9/2022 where it was described as deep vaginal pain, left lower quadrants pain to perineal and rectal pain.  She was to consider ganglion impar block, and see a pain psychologist.   - Seem with sports medicine 2/14/2023 for suspected right sided sciatica and right lower back pain.  She was to utilize PT, and ibuprofen.    Today, the patient describes sciatica like pain on her right side. Her pelvic pain flares occasionally, but for the most part it is stable.     She developed sciatica like pain in 02/2023 for which she saw an sports medicine provider (as above). She has tried physical therapy for a month, which did help resolve many of her symptoms.  She reported having numbness from her knee distally, and upper buttock.  She has had pins and needles in her feet when trying to sleep from her knees distally, without any pain or weakness being reported.   She also feels like her arms can become tingling b/l when she tries to sleep, but doesn't notice this symptom at any other time (with any specific neck motions).       Physical Exam:   General: Seated comfortably in  no acute distress.  Neurologic:     Mental Status: Fully alert, attentive and oriented. Speech clear and fluent, no paraphasic errors.     Cranial Nerves: EOMI with normal smooth pursuit. Facial movements symmetric. Hearing not formally tested but intact to conversation.  No dysarthria.     Motor: No tremors or other abnormal movements observed.          Assessment and Plan:   Assessment:  Sciatica versus L5 radiculopathy  Varied paresthesias with laying down    The patient's pelvic pain is still somewhat unexplained, but is reduced with duloxetine.  Her new symptoms are mostly concerning for sciatica given the distribution of paresthesias, but the extra issues of b/l leg/foot numbness and hand/arm paresthesias with laying isn't quite consistent with sciatica.  Overall, I feel that the patient should have further imaging to look for a central cause of paresthesias, varied pain given her risk factors for autoimmune related conditions and ongoing symptoms which are new since out last visit.  I also feel that a repeat EMG of both legs could provide better insight in if certain treatments for radiculopathy versus sciatica are needed (general steroid versus localized injections like TFESI).    We also discussed that her generalized pelvic pain management could be taken over by her PCP or pain clinic, as I wasn't able to define this condition as primarily neurological in nature.  I will give her further scripts for duloxetine, but would want one of her current providers to continue management of the medication if they see fit.    Plan:  - MRI brain, MRI cervical spine, MRI thoracic spine w/wout contrast  - EMG b/l lower extremities (R>L)    Follow up in Neurology clinic in 3 months (after testing), or earlier as needed should new concerns arise.    DONATO Zhou D.O.   of Neurology    Total time today (32 min) in this patient encounter was spent on pre-charting, counseling and/or coordination of  care.

## 2023-08-08 NOTE — NURSING NOTE
Is the patient currently in the state of [place]? YES    Visit mode:VIDEO    If the visit is dropped, the patient can be reconnected by: VIDEO VISIT: Send to e-mail at: [email or web address]    Will anyone else be joining the visit? NO      How would you like to obtain your AVS? MyChart    Are changes needed to the allergy or medication list? NO    Reason for visit: RECHECK (Nerve tissue in feet, leg and update on med regimen )

## 2023-08-08 NOTE — LETTER
8/8/2023       RE: Shala Morales  [address]  [place] [address]     Dear Colleague,    Thank you for referring your patient, Shala Morales, to the [facility] [place] [facility]. Please see a copy of my visit note below.    [facility] Follow Up Visit    Shala Morales MRN# [id number]   Age: 48 year old YOB: 1975     Brief history of symptoms: The patient was initially seen in neurologic consultation on 4/9/2021 and most recently 10/14/2021 ant 6/10/2021 for evaluation of peripheral pelvic pain. Please see the comprehensive neurologic consultation notes from those dates in the Epic records for details.     Prior evaluations which were pertinent:  - Normal EMG 7/13/2021  - MRI lumbar spine 5/1/2021 with some mild neuroforaminal narrowing and spondylosis at L3-S1.  - Serum studies were normal 6/15/2021    Symptoms described on our last visit were that of deep vaginal pain, mildly responsive to pelvic floor therapies.  It was recommended she trial neuropathic agents, and be seen with OBGYN and Pain clinic.    Interval history:   - Seen with pain management 2/9/2022 where it was described as deep vaginal pain, left lower quadrants pain to perineal and rectal pain.  She was to consider ganglion impar block, and see a pain psychologist.   - Seem with sports medicine 2/14/2023 for suspected right sided sciatica and right lower back pain.  She was to utilize PT, and ibuprofen.    Today, the patient describes sciatica like pain on her right side. Her pelvic pain flares occasionally, but for the most part it is stable.     She developed sciatica like pain in 02/2023 for which she saw an sports medicine provider (as above). She has tried physical therapy for a month, which did help resolve many of her symptoms.  She reported having numbness from her knee distally, and upper buttock.  She has had pins and  needles in her feet when trying to sleep from her knees distally, without any pain or weakness being reported.   She also feels like her arms can become tingling b/l when she tries to sleep, but doesn't notice this symptom at any other time (with any specific neck motions).       Physical Exam:   General: Seated comfortably in no acute distress.  Neurologic:     Mental Status: Fully alert, attentive and oriented. Speech clear and fluent, no paraphasic errors.     Cranial Nerves: EOMI with normal smooth pursuit. Facial movements symmetric. Hearing not formally tested but intact to conversation.  No dysarthria.     Motor: No tremors or other abnormal movements observed.          Assessment and Plan:   Assessment:  Sciatica versus L5 radiculopathy  Varied paresthesias with laying down    The patient's pelvic pain is still somewhat unexplained, but is reduced with duloxetine.  Her new symptoms are mostly concerning for sciatica given the distribution of paresthesias, but the extra issues of b/l leg/foot numbness and hand/arm paresthesias with laying isn't quite consistent with sciatica.  Overall, I feel that the patient should have further imaging to look for a central cause of paresthesias, varied pain given her risk factors for autoimmune related conditions and ongoing symptoms which are new since out last visit.  I also feel that a repeat EMG of both legs could provide better insight in if certain treatments for radiculopathy versus sciatica are needed (general steroid versus localized injections like TFESI).    We also discussed that her generalized pelvic pain management could be taken over by her PCP or pain clinic, as I wasn't able to define this condition as primarily neurological in nature.  I will give her further scripts for duloxetine, but would want one of her current providers to continue management of the medication if they see fit.    Plan:  - MRI brain, MRI cervical spine, MRI thoracic spine w/wout  contrast  - EMG b/l lower extremities (R>L)    Follow up in Neurology clinic in 3 months (after testing), or earlier as needed should new concerns arise.    Total time today (32 min) in this patient encounter was spent on pre-charting, counseling and/or coordination of care.         Again, thank you for allowing me to participate in the care of your patient.      Sincerely,    Stefan Zhou, DO

## 2023-08-08 NOTE — PROGRESS NOTES
Virtual Visit Details    Type of service:  Video Visit     Originating Location (pt. Location): Home    Distant Location (provider location):  On-site  Platform used for Video Visit: Hyacinth     12-Aug-2020 13:49

## 2023-09-05 ENCOUNTER — OFFICE VISIT (OUTPATIENT)
Dept: ORTHOPEDICS | Facility: CLINIC | Age: 48
End: 2023-09-05
Payer: COMMERCIAL

## 2023-09-05 VITALS — BODY MASS INDEX: 29.07 KG/M2 | HEIGHT: 69 IN

## 2023-09-05 DIAGNOSIS — M54.16 RIGHT LUMBAR RADICULOPATHY: ICD-10-CM

## 2023-09-05 DIAGNOSIS — M54.16 BILATERAL LUMBAR RADICULOPATHY: Primary | ICD-10-CM

## 2023-09-05 PROCEDURE — 99213 OFFICE O/P EST LOW 20 MIN: CPT | Performed by: FAMILY MEDICINE

## 2023-09-05 ASSESSMENT — PAIN SCALES - GENERAL: PAINLEVEL: NO PAIN (1)

## 2023-09-05 NOTE — PROGRESS NOTES
ASSESSMENT & PLAN    Shala was seen today for follow up.    Diagnoses and all orders for this visit:    Bilateral lumbar radiculopathy  -     Physical Therapy Referral; Future  -     Pain Management  Referral; Future    Right lumbar radiculopathy        # Acute on Chronic Bilateral Lumbar Radiculopathy: Shala Morales  was seen today for acute on chronic low back pain with numbness and tingling in the feet and calves bilaterally.  She has noted symptoms over the past several years with recent flareup.  She has gone to physical therapy last year that helped her symptoms.  No weakness currently.  On examination there is some tenderness to palpation in the low back but no pain with sciatic nerve stretching.  Reviewed previous lumbar MRI as well as previous studies ordered by neurology that were negative for inflammatory arthritis.  She does have plans for brain/cervical/thoracic MRI to evaluate for possible MS.  There is lower concern for this as well.  Likely cause of patient's pain due to bilateral lumbar radiculopathy flaring up recently.  Previous oral steroid did not help her symptoms significantly.  She has been on nerve medications including gabapentin with uncertain improvement of her symptoms.  No red flag symptoms/signs on history or examination.  Given duration of symptoms and in bilateral lower extremities plan to start with a trial of physical therapy and lumbar epidural steroid injection to see if this can help her symptoms.  If not can consider repeat lumbar MRI versus other studies including EMGs/labs.  Counseled patient on nature of condition and treatment options.  Given this plan as below, follow-up as needed in one month     Image Findings: none today  Treatment: Activities as tolerated, continue home exercises, PT referral placed, referral for lumbar epidural steroid injection placed  Job: As tolerated  Medications/Injections: Limited tylenol/ibuprofen for pain for 1-2 weeks, epidural  "steroid injection order placed  Follow-up: In one month if symptoms do not improve, sooner if worsening  Can consider further evaluation, imaging/medications    -----    SUBJECTIVE:  Shala Morales is a 48 year old female who is seen in follow-up for low back pain.They were last seen 2/14/23.  The patient is seen by themselves. Did neurology did get brain/cervical/thoracic.    Since their last visit reports persisting numbness and tingling.  They indicate that their current pain level is 1/10. They have tried rest/activity avoidance.    Prednisone didn't seem to help at the beginning of August. Has bilateral lower extremity N/T from feet to knees. No LE weakness.     Patient's past medical, surgical, social, and family histories were reviewed today and no changes are noted.    REVIEW OF SYSTEMS:  Constitutional: NEGATIVE for fever, chills, change in weight  Skin: NEGATIVE for worrisome rashes, moles or lesions  GI/: NEGATIVE for bowel or bladder changes  Neuro: NEGATIVE for weakness, dizziness or paresthesias    OBJECTIVE:  Ht 1.74 m (5' 8.5\")   BMI 29.07 kg/m     General: healthy, alert and in no distress  HEENT: no scleral icterus or conjunctival erythema  Skin: no suspicious lesions or rash. No jaundice.  CV: regular rhythm by palpation, no pedal edema  Resp: normal respiratory effort without conversational dyspnea   Psych: normal mood and affect  Gait: normal steady gait with appropriate coordination and balance  Neuro: normal light touch sensory exam of the extremities.    MSK:    THORACIC/LUMBAR SPINE  Inspection:    No redness, swelling, overlying skin change, gross deformity/asymmetry, scapular winging  Palpation:    Tender about the left para lumbar muscles and right para lumbar muscles. Otherwise remainder of landmarks are nontender.  Range of Motion:     Lumbar flexion limited slightly by pain    Lumbar extension limited slightly by pain    Right side bend full    Left side bend full    Right " rotation full    Left rotation full  Strength:    5/5 - quadriceps, hamstrings, tibialis anterior, gastrocsoleus, and extensor hallicus longus  Special Tests:    Positive: None  Negative: straight leg raise (bilateral), slump test (bilateral)    Independent visualization of the below image:   .    Wally Morillo MD, [facility]    Disclaimer: This note consists of symbols derived from keyboarding, dictation and/or voice recognition software. As a result, there may be errors in the script that have gone undetected. Please consider this when interpreting information found in this chart.

## 2023-09-05 NOTE — PATIENT INSTRUCTIONS
# Acute on Chronic Bilateral Lumbar Radiculopathy: Shala Morales  was seen today for acute on chronic low back pain with numbness and tingling in the feet and calves bilaterally.  She has noted symptoms over the past several years with recent flareup.  She has gone to physical therapy last year that helped her symptoms.  No weakness currently.  On examination there is some tenderness to palpation in the low back but no pain with sciatic nerve stretching.  Reviewed previous lumbar MRI as well as previous studies ordered by neurology that were negative for inflammatory arthritis.  She does have plans for brain/cervical/thoracic MRI to evaluate for possible MS.  There is lower concern for this as well.  Likely cause of patient's pain due to bilateral lumbar radiculopathy flaring up recently.  Previous oral steroid did not help her symptoms significantly.  She has been on nerve medications including gabapentin with uncertain improvement of her symptoms.  No red flag symptoms/signs on history or examination.  Given duration of symptoms and in bilateral lower extremities plan to start with a trial of physical therapy and lumbar epidural steroid injection to see if this can help her symptoms.  If not can consider repeat lumbar MRI versus other studies including EMGs/labs.  Counseled patient on nature of condition and treatment options.  Given this plan as below, follow-up as needed in one month     Image Findings: none today  Treatment: Activities as tolerated, continue home exercises, PT referral placed, referral for lumbar epidural steroid injection placed  Job: As tolerated  Medications/Injections: Limited tylenol/ibuprofen for pain for 1-2 weeks, epidural steroid injection order placed  Follow-up: In one month if symptoms do not improve, sooner if worsening  Can consider further evaluation, imaging/medications    Please call [phone]   Ask for my team if you have any questions or concerns    If you have not yet  received the influenza vaccine but would like to get one, please call  [phone] or you can schedule via [employer]    It was great seeing you again today!    Wally Morillo MD, CAQSM

## 2023-09-05 NOTE — LETTER
9/5/2023         RE: Shala Morales  [address] [address]        Dear Colleague,    Thank you for referring your patient, Shala Morales, to the [facility]. Please see a copy of my visit note below.    ASSESSMENT & PLAN    Shala was seen today for follow up.    Diagnoses and all orders for this visit:    Bilateral lumbar radiculopathy  -     Physical Therapy Referral; Future  -     Pain Management  Referral; Future    Right lumbar radiculopathy        # Acute on Chronic Bilateral Lumbar Radiculopathy: Shala Morales  was seen today for acute on chronic low back pain with numbness and tingling in the feet and calves bilaterally.  She has noted symptoms over the past several years with recent flareup.  She has gone to physical therapy last year that helped her symptoms.  No weakness currently.  On examination there is some tenderness to palpation in the low back but no pain with sciatic nerve stretching.  Reviewed previous lumbar MRI as well as previous studies ordered by neurology that were negative for inflammatory arthritis.  She does have plans for brain/cervical/thoracic MRI to evaluate for possible MS.  There is lower concern for this as well.  Likely cause of patient's pain due to bilateral lumbar radiculopathy flaring up recently.  Previous oral steroid did not help her symptoms significantly.  She has been on nerve medications including gabapentin with uncertain improvement of her symptoms.  No red flag symptoms/signs on history or examination.  Given duration of symptoms and in bilateral lower extremities plan to start with a trial of physical therapy and lumbar epidural steroid injection to see if this can help her symptoms.  If not can consider repeat lumbar MRI versus other studies including EMGs/labs.  Counseled patient on nature of condition and treatment options.  Given this plan as below, follow-up as needed in one  "month     Image Findings: none today  Treatment: Activities as tolerated, continue home exercises, PT referral placed, referral for lumbar epidural steroid injection placed  Job: As tolerated  Medications/Injections: Limited tylenol/ibuprofen for pain for 1-2 weeks, epidural steroid injection order placed  Follow-up: In one month if symptoms do not improve, sooner if worsening  Can consider further evaluation, imaging/medications    -----    SUBJECTIVE:  Shala Morales is a 48 year old female who is seen in follow-up for low back pain.They were last seen 2/14/23.  The patient is seen by themselves. Did neurology did get brain/cervical/thoracic.    Since their last visit reports persisting numbness and tingling.  They indicate that their current pain level is 1/10. They have tried rest/activity avoidance.    Prednisone didn't seem to help at the beginning of August. Has bilateral lower extremity N/T from feet to knees. No LE weakness.     Patient's past medical, surgical, social, and family histories were reviewed today and no changes are noted.    REVIEW OF SYSTEMS:  Constitutional: NEGATIVE for fever, chills, change in weight  Skin: NEGATIVE for worrisome rashes, moles or lesions  GI/: NEGATIVE for bowel or bladder changes  Neuro: NEGATIVE for weakness, dizziness or paresthesias    OBJECTIVE:  Ht 1.74 m (5' 8.5\")   BMI 29.07 kg/m     General: healthy, alert and in no distress  HEENT: no scleral icterus or conjunctival erythema  Skin: no suspicious lesions or rash. No jaundice.  CV: regular rhythm by palpation, no pedal edema  Resp: normal respiratory effort without conversational dyspnea   Psych: normal mood and affect  Gait: normal steady gait with appropriate coordination and balance  Neuro: normal light touch sensory exam of the extremities.    MSK:    THORACIC/LUMBAR SPINE  Inspection:    No redness, swelling, overlying skin change, gross deformity/asymmetry, scapular winging  Palpation:    Tender about " the left para lumbar muscles and right para lumbar muscles. Otherwise remainder of landmarks are nontender.  Range of Motion:     Lumbar flexion limited slightly by pain    Lumbar extension limited slightly by pain    Right side bend full    Left side bend full    Right rotation full    Left rotation full  Strength:    5/5 - quadriceps, hamstrings, tibialis anterior, gastrocsoleus, and extensor hallicus longus  Special Tests:    Positive: None  Negative: straight leg raise (bilateral), slump test (bilateral)    Independent visualization of the below image:   .    Wally Morillo MD, [facility]    Disclaimer: This note consists of symbols derived from keyboarding, dictation and/or voice recognition software. As a result, there may be errors in the script that have gone undetected. Please consider this when interpreting information found in this chart.      Again, thank you for allowing me to participate in the care of your patient.        Sincerely,        Wally Morillo MD

## 2023-09-06 ENCOUNTER — TELEPHONE (OUTPATIENT)
Dept: PALLIATIVE MEDICINE | Facility: CLINIC | Age: 48
End: 2023-09-06
Payer: COMMERCIAL

## 2023-09-06 NOTE — TELEPHONE ENCOUNTER
"Screening Questions for Radiology Injections:    Injection to be done at which interventional clinic site? [facility]    If choosing [facility] for location, please inform patient:  \"[facility] is a Hospital based clinic. Before your visit, you should check with your insurance about how it covers the charges for facility services in a hospital-based clinic.     Procedure ordered by Dr. Morillo     Procedure ordered? LESI     Transforaminal Cervical CHINTAN - Send to [facility] ([facility]) - No [facility] providers perform this procedure    What insurance would patient like us to bill for this procedure? [employer]     IF SCHEDULING IN [facility] PLEASE SCHEDULE AT LEAST 7-10 BUSINESS DAYS OUT SO A PA CAN BE OBTAINED    Worker's comp or MVA (motor vehicle accident) -Any injection DO NOT SCHEDULE and route to Sonya Silva.      [employer] insurance - For SI joint injections, DO NOT SCHEDULE and route to Kerri Carl.       ALL BCBS, [employer] and [employer] - DO NOT SCHEDULE and route to Kerri Carl    MEDICA- facet joint injections, route to Kerri Siddhartha    Is patient scheduled at [facility]? NO    If YES, route every encounter to [email or web address] SPINE CENTER CARE NAVIGATION POOL [[id number]]    Is an  needed? No     Patient has a  home? (Review Grid) YES: Informed     Any chance of pregnancy? Not Applicable   If YES, do NOT schedule and route to RN pool  - Dr. Abbasi route to Tayler Bazzi and PM&R Nurse  [[id number]]      Is patient actively being treated for cancer or immunocompromised? No  If YES, do NOT schedule and route to RN pool/ Dr. Abbasi's Team    Does the patient have a bleeding or clotting disorder? No     If YES, okay to schedule AND route to RN nurse / Dr. Abbasi's Team     (For any patients with platelet count <100, RN must forward to provider)    Is patient taking any Blood Thinners OR Antiplatelet medication?  No   If hold needed, do NOT " schedule, route to WENDY garcia/ Dr. Abbasi's Team    Examples:   o Blood Thinners: (Coumadin, Warfarin, Jantoven, Pradaxa, Xarelto, Eliquis, Edoxaban, Enoxaparin, Lovenox, Heparin, Arixtra, Fondaparinux or Fragmin)  o Antiplatelet Medications: (Plavix, Brilinta or Effient)     Is patient taking any aspirin products (includes Excedrin and Fiorinal)? No     If more than 325mg/day, OK to schedule; Instruct Pt to decrease to less than 325 mg for 7 days AND route to RN pool/ Dr. Abbasi's Team     For CERVICAL procedures, hold all aspirin products for 6 days.     Tell Pt that if aspirin product is not held for 6 days, the procedure WILL BE cancelled.     Any allergies to contrast dye, iodine, shellfish, or numbing and steroid medications? No    If YES, schedule and add allergy information to appointment notes AND route to the RN pool/ Dr. Abbasi's Team    If CHINTAN and Contrast Dye / Iodine Allergy? DO NOT SCHEDULE, route to RN pool/ Dr. Abbasi's Team    Allergies: Patient has no known allergies.     Does patient have an active infection or treated for one within the past week? No    Is patient currently taking any antibiotics or steroid medications?  No     For patients on chronic, preventative, or prophylactic antibiotics, procedures may be scheduled.     For patients on antibiotics for active or recent infection, schedule 4 days after completed.    For patients on steroid medications, schedule 4 days after completed.     Has the patient had a flu shot or any other vaccinations within the past 7 days? No  If yes, explain that for the vaccine to work best they need to:       wait 1 week before and 1 week after getting any Vaccine    wait 1 week before and 2 weeks after getting Covid Vaccine #2 or BOOSTER    If patient has concerns about the timing, send to RN pool/ Dr. Abbasi's Team    Does patient have an MRI/CT?  YES: 05/01/2021 Include Date and Check Procedure Scheduling Grid to see if required.    Was the MRI/CT done within  the last 3 years?  Yes     If no route to RN Pool/ Dr. Abbasi's Team    If yes, where was the MRI/CT done? [facility]      Refer to PACS Transmissions list for approved external locations and route to RN Pool High Priority/ Dr. Torress Team    If MRI was not done at approved external location do NOT schedule and route to RN pool/ Dr. Abbasi's Team      If patient has an imaging disc, the injection MAY be scheduled but patient must bring disc to appt or appt will be cancelled.    Is patient able to transfer to a procedure table with minimal or no assistance? Yes     If no, do NOT schedule and route to RN Pool/ Dr. Abbasi's Team    Procedure Specific Instructions:    If celiac plexus block, informed patient NPO for 6 hours and that it is okay to take medications with sips of water, especially blood pressure medications Not Applicable         If this is for a cervical procedure, informed patient that aspirin needs to be held for 6 days.   Not Applicable      Sedation, If Sedation is ordered for any procedure, patient must be NPO for 6 hours prior to procedure Not Applicable      If IV needed:    Do not schedule procedures requiring IV placement in the first appointment of the day or first appointment after lunch. Do NOT schedule at 0745, 0815 or 1245.       Instructed patient to arrive 30 minutes early for IV start if required. (Check Procedure Scheduling Grid)  Not Applicable    Reminders:    If you are started on any steroids or antibiotics between now and your appointment, you must contact us because the procedure may need to be cancelled.  Yes      As a reminder, receiving steroids can decrease your body's ability to fight infection.   Would you still like to move forward with scheduling the injection?  Yes      IV Sedation is not provided for procedures. If oral anti-anxiety medication is needed, the patient should request this from their referring provider.      Instruct patient to arrive as directed  prior to the scheduled appointment time:  If IV needed 30 minutes before appointment time       For patients 85 or older we recommend having an adult stay w/ them for the remainder of the day.       If the patient is Diabetic, remind them to bring their glucometer.      Does the patient have any questions?  NO  Cara Wilson  [facility]

## 2023-09-17 ENCOUNTER — ANCILLARY PROCEDURE (OUTPATIENT)
Dept: MRI IMAGING | Facility: CLINIC | Age: 48
End: 2023-09-17
Attending: PSYCHIATRY & NEUROLOGY
Payer: COMMERCIAL

## 2023-09-17 DIAGNOSIS — R20.2 PARESTHESIAS: ICD-10-CM

## 2023-09-17 PROCEDURE — 72156 MRI NECK SPINE W/O & W/DYE: CPT | Mod: GC | Performed by: RADIOLOGY

## 2023-09-17 PROCEDURE — 70553 MRI BRAIN STEM W/O & W/DYE: CPT | Mod: GC | Performed by: RADIOLOGY

## 2023-09-17 PROCEDURE — 72157 MRI CHEST SPINE W/O & W/DYE: CPT | Mod: GC | Performed by: RADIOLOGY

## 2023-09-17 PROCEDURE — A9585 GADOBUTROL INJECTION: HCPCS | Mod: JZ | Performed by: RADIOLOGY

## 2023-09-17 RX ORDER — GADOBUTROL 604.72 MG/ML
0.1 INJECTION INTRAVENOUS ONCE
Status: COMPLETED | OUTPATIENT
Start: 2023-09-17 | End: 2023-09-17

## 2023-09-17 RX ADMIN — GADOBUTROL 8.8 ML: 604.72 INJECTION INTRAVENOUS at 09:40

## 2023-09-17 NOTE — DISCHARGE INSTRUCTIONS

## 2023-09-18 ENCOUNTER — TELEPHONE (OUTPATIENT)
Dept: NEUROLOGY | Facility: CLINIC | Age: 48
End: 2023-09-18
Payer: COMMERCIAL

## 2023-09-18 DIAGNOSIS — R20.2 PARESTHESIAS: Primary | ICD-10-CM

## 2023-09-18 NOTE — Clinical Note
Could you parth Last set up a follow up with me, virtually, at her convenience?  Thank you much, DONATO

## 2023-09-18 NOTE — TELEPHONE ENCOUNTER
Spoke with Shala about her image findings.  Overall, the images are reassuring and I would want to repeat the brain image in one year to monitor for atypical white matter changes/advancement.  Otherwise, we discussed that her abnormal enhancement of thyroid nodules would require ultrasound images, which I will order.  I asked she contact her PCP to set up a follow up regarding her thyroid findings though.    - US thyroid  - Follow up with me in 1-3 months to go over brain and spinal images.    DONATO Zhou D.O.  [facility]

## 2023-09-25 ENCOUNTER — ANCILLARY PROCEDURE (OUTPATIENT)
Dept: ULTRASOUND IMAGING | Facility: CLINIC | Age: 48
End: 2023-09-25
Attending: PSYCHIATRY & NEUROLOGY
Payer: COMMERCIAL

## 2023-09-25 ENCOUNTER — TELEPHONE (OUTPATIENT)
Dept: ENDOCRINOLOGY | Facility: CLINIC | Age: 48
End: 2023-09-25

## 2023-09-25 DIAGNOSIS — E04.1 THYROID NODULE: ICD-10-CM

## 2023-09-25 DIAGNOSIS — R20.2 PARESTHESIAS: ICD-10-CM

## 2023-09-25 PROCEDURE — 76536 US EXAM OF HEAD AND NECK: CPT | Mod: TC | Performed by: RADIOLOGY

## 2023-09-25 ASSESSMENT — PATIENT HEALTH QUESTIONNAIRE - PHQ9
SUM OF ALL RESPONSES TO PHQ QUESTIONS 1-9: 4
10. IF YOU CHECKED OFF ANY PROBLEMS, HOW DIFFICULT HAVE THESE PROBLEMS MADE IT FOR YOU TO DO YOUR WORK, TAKE CARE OF THINGS AT HOME, OR GET ALONG WITH OTHER PEOPLE: NOT DIFFICULT AT ALL
SUM OF ALL RESPONSES TO PHQ QUESTIONS 1-9: 4

## 2023-09-25 NOTE — TELEPHONE ENCOUNTER
[facility] Call Center    Phone Message    May a detailed message be left on voicemail: yes     Reason for Call: Appointment Intake      Referring Provider Name: Mariluz Potter MD     Diagnosis and/or Symptoms: Thyroid Nodule    scheduling unable to meet referral timeframe; First available scheduled at preferred location, sending for escalation.    Writer seen encounter on 7/24/2023, patients appt on 1/16/2023 was cancelled and rescheduled with patient as the appt was scheduled incorrectly with a diabetes provider and not a provider that can see patient for Thyroid Nodule.       Action Taken: Message routed to:  [facility] Endo    Travel Screening: Not Applicable

## 2023-09-26 ENCOUNTER — VIRTUAL VISIT (OUTPATIENT)
Dept: FAMILY MEDICINE | Facility: CLINIC | Age: 48
End: 2023-09-26
Payer: COMMERCIAL

## 2023-09-26 ENCOUNTER — THERAPY VISIT (OUTPATIENT)
Dept: PHYSICAL THERAPY | Facility: CLINIC | Age: 48
End: 2023-09-26
Attending: FAMILY MEDICINE
Payer: COMMERCIAL

## 2023-09-26 DIAGNOSIS — K59.00 CONSTIPATION, UNSPECIFIED CONSTIPATION TYPE: ICD-10-CM

## 2023-09-26 DIAGNOSIS — R79.0 LOW FERRITIN: ICD-10-CM

## 2023-09-26 DIAGNOSIS — E04.1 THYROID NODULE: Primary | ICD-10-CM

## 2023-09-26 DIAGNOSIS — M54.16 BILATERAL LUMBAR RADICULOPATHY: Primary | ICD-10-CM

## 2023-09-26 DIAGNOSIS — M54.16 LUMBAR RADICULOPATHY: ICD-10-CM

## 2023-09-26 PROCEDURE — 97162 PT EVAL MOD COMPLEX 30 MIN: CPT | Mod: GP | Performed by: PHYSICAL THERAPIST

## 2023-09-26 PROCEDURE — 97530 THERAPEUTIC ACTIVITIES: CPT | Mod: GP | Performed by: PHYSICAL THERAPIST

## 2023-09-26 PROCEDURE — 97110 THERAPEUTIC EXERCISES: CPT | Mod: GP | Performed by: PHYSICAL THERAPIST

## 2023-09-26 PROCEDURE — 99204 OFFICE O/P NEW MOD 45 MIN: CPT | Mod: VID | Performed by: FAMILY MEDICINE

## 2023-09-26 NOTE — PROGRESS NOTES
PHYSICAL THERAPY EVALUATION  Type of Visit: Evaluation    See electronic medical record for Abuse and Falls Screening details.    Subjective       Presenting condition or subjective complaint: Having low back and sciatica on right side. Mainly effecting my lower leg and foot but some tingling that occurs in bilateral lower extremities at night while laying down.  Date of onset: 08/01/23    Relevant medical history: Bladder or bowel problems; Concussions; High blood pressure; Neck injury; Numbness or tingling in perianal area; Overweight; Pain at night or rest; Substance use disorder; Thyroid problems   Dates & types of surgery: No surgeries    Prior diagnostic imaging/testing results: MRI; EMG     Prior therapy history for the same diagnosis, illness or injury: Yes PT in early 2023 for first flare of sciatic pain that never fully resolved    Prior Level of Function  Transfers:   Ambulation:   ADL:   IADL:     Living Environment  Social support: With a significant other or spouse   Type of home: House   Stairs to enter the home: Yes 3 Is there a railing: Yes   Ramp: No   Stairs inside the home: Yes 3 Is there a railing: Yes   Help at home: None  Equipment owned:       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  Minimal.  Standing and looking down can be problematic- slight lieaning forward.    Hobbies/Interests: Walking, movie watching, gardening, spending time with family.  [employer]qua aerobics, goes in spurts to the gym.  Belongs to lifetime in [place].  Does not do a lot of stability or stretching at this time.  Pt is  no children, no animals.      Patient goals for therapy: Walk without having moderate to intense foot and back pain. Like to be able to have more range of motion with bending, twisting and sitting.    Pain assessment: Pain present     Objective   LUMBAR SPINE EVALUATION  PAIN: Pain Level at Rest: 4/10  Pain Level with Use: 4/10  Pain Location: Lower back stiffness and pain on the  left side.  Numbness was lateral in the legs, outside and to the feet.  I was also feeling it in the lower arms/thumb   Pain Quality: Tingling and tightness localized   Pain Frequency: constant  Pain is Worst: daytime  Pain is Exacerbated By: Walking a lot (over 45 min of continuous movement), changing position - bending forward lately.  Certain sitting positions make it worse.    Pain is Relieved By: modification of activity.  Relaxation and between activity.    Pain Progression: improved tingling, soreness pinching seems worse.    INTEGUMENTARY (edema, incisions):   POSTURE:  Standing: right shoulder slightly lower, rounded shoulders.  Equal illiac crest, B PSIS.   (-) standing march and forward bend motion.    Strength: proper squat mechanics     MMT: Hip ER 4/5 B; hip IR 5/5 B    GAIT:   Weightbearing Status: WBAT  Assistive Device(s): None  Gait Deviations: WNL  BALANCE/PROPRIOCEPTION:  SLS R 2 sec, L 4 sec.    WEIGHTBEARING ALIGNMENT:   NON-WEIGHTBEARING ALIGNMENT:    ROM:  AROM: flexion 75%, extension 50% tightness in the right side- no improvement with additional motion.  SB incr pulling with leaning R and pulling through L.  Rotation end range pain R   STRENGTH: WNL  MYOTOMES: WNL  DTR S:   CORD SIGNS:   DERMATOMES: WNL  NEURAL TENSION:  (-) Slump,   FLEXIBILITY:  (-) hamstring   LUMBAR/HIP Special Tests:  + FADIR, BAO, passive hip flexion L hip for hip pain.  + R hip FADIR for lateral low back pain.  + segmental instability.      PELVIS/SI SPECIAL TESTS:  Supine to long sit   FUNCTIONAL TESTS:   PALPATION:   SPINAL SEGMENTAL CONCLUSIONS:       Assessment & Plan   CLINICAL IMPRESSIONS  Medical Diagnosis: Bilateral lumbar radiculopathy    Treatment Diagnosis: LBP, lumbar radiculopathy   Impression/Assessment: Patient is a 48 year old female with lumbar radiculopathy complaints.  Pt demonstrates segmental instability through the lumbar spine and pelvic joint alignment deficits.  The following significant  findings have been identified: Pain, Decreased ROM/flexibility, Decreased joint mobility, Decreased strength, Impaired balance, Decreased proprioception, Impaired sensation, Inflammation, Impaired gait, Impaired muscle performance, Decreased activity tolerance, and Impaired posture. These impairments interfere with their ability to perform self care tasks, work tasks, recreational activities, household chores, and driving  as compared to previous level of function.     Clinical Decision Making (Complexity):  Clinical Presentation: Evolving/Changing  Clinical Presentation Rationale: based on medical and personal factors listed in PT evaluation  Clinical Decision Making (Complexity): Moderate complexity    PLAN OF CARE  Treatment Interventions:  Modalities: Cryotherapy, E-stim, Hot Pack, Ultrasound  Interventions: Gait Training, Manual Therapy, Neuromuscular Re-education, Therapeutic Activity, Therapeutic Exercise    Long Term Goals     PT Goal 1  Goal Identifier: Ambulation  Goal Description: Pt to walk 45 min without foot or low back pain  Rationale: to maximize safety and independence with performance of ADLs and functional tasks  Target Date: 12/24/23      Frequency of Treatment: 1x/week  Duration of Treatment: 6 weeks, then 2x/month for 6 weeks    Recommended Referrals to Other Professionals: Physical Therapy  Education Assessment:        Risks and benefits of evaluation/treatment have been explained.   Patient/Family/caregiver agrees with Plan of Care.     Evaluation Time:     PT Eval, Moderate Complexity Minutes (93657): 25       Signing Clinician: Alma Brooks PT

## 2023-09-26 NOTE — PROGRESS NOTES
Shala is a 48 year old who is being evaluated via a billable video visit.      How would you like to obtain your AVS? MyChart  If the video visit is dropped, the invitation should be resent by: Text to cell phone: [phone]  Will anyone else be joining your video visit? No      Assessment & Plan     Thyroid nodule  Thyroid nodules incidentally noted on MRI cervical spine so patient had thyroid US performed recently. Outside records/results were reviewed with her today, including 3 that met FNA biopsy criteria. Order placed, will provide follow up recommendations based on results. She is also interested in full thyroid panel testing which was placed.   - US Biopsy Thyroid Fine Needle Aspiration; Future  - TSH; Future  - T4, free; Future  - T3, total; Future    Lumbar radiculopathy  Symptoms controlled with cymablta 60 mg daily. Gabapentin has been helpful in the past as well. Will plan to take over refills of cymbalta.    Constipation, unspecified constipation type  Chronic constipation, currently supplementing with high dose magnesium. Assess electrolytes, particularly magnesium and potassium to ensure not reaching toxic levels. May need to recommend less than 600 mg of current magnesium dose.   - Magnesium; Future  - Basic metabolic panel  (Ca, Cl, CO2, Creat, Gluc, K, Na, BUN); Future    Low ferritin  Ferritin previously in low normal range at 15. She has since started supplementing with iron. Will also plan to reassess level. Consider decreasing frequency in case this is contributing to her constipation.  - Ferritin; Future    DO CHAYA Salas [facility]    Teri Last is a 48 year old, presenting for the following health issues:  Establish Care and Follow Up (Thyroid ultrasound results from 9/25)        9/26/2023     9:30 AM   Additional Questions   Roomed by COTY Concepcion CMA   Accompanied by -       History of Present Illness       Reason for visit:  Follow up on  abnormal thyroid ultrasound, possible lab testing  Symptom onset:  1-2 weeks ago  Symptoms include:  Hypothyroid sx- hair loss, itchy dry skin, weight gain, swelling in abdomen, constipation  Symptom intensity:  Moderate  Symptom progression:  Staying the same  Had these symptoms before:  No    She eats 2-3 servings of fruits and vegetables daily.She consumes 0 sweetened beverage(s) daily.She exercises with enough effort to increase her heart rate 10 to 19 minutes per day.  She exercises with enough effort to increase her heart rate 3 or less days per week.   She is taking medications regularly.     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 Gabapentin and duloxetine helps. Can sit with cushion now. No longer has chronic pain. Rare diazepam and lidocaine use. Perimenopause symptoms, on estradiol cream through GYN. Also experiences constipation, hair loss, fatigue, abd bloating. Wondering if thyroid can contribute to this.     MS features, particular lumbar radiculopathy so MRIs performed. Noted to have thyroid nodules. Had follow up thyorid US yesterday. Wondering about next steps.    Has had low iron in the past. Supplementing with iron now x 1-2 months. Previously vegan, now vegetarian.     Chronic constipation, bloating, hard stools. Supplementing with magnesium >600 mg per day. No SEs noted. Wondering if this is safe       Objective           Vitals:  No vitals were obtained today due to virtual visit.    Physical Exam   GENERAL: Healthy, alert and no distress  EYES: Eyes grossly normal to inspection.  No discharge or erythema, or obvious scleral/conjunctival abnormalities.  RESP: No audible wheeze, cough, or visible cyanosis.  No visible retractions or increased work of breathing.    SKIN: Visible skin clear. No significant rash, abnormal pigmentation or lesions.  NEURO: Cranial nerves grossly intact.  Mentation and speech appropriate for age.  PSYCH: Mentation appears normal, affect normal/bright, judgement  and insight intact, normal speech and appearance well-groomed.                Video-Visit Details    Type of service:  Video Visit     Originating Location (pt. Location): Home    Distant Location (provider location):  off site  Platform used for Video Visit: Hyacinth

## 2023-09-28 ENCOUNTER — LAB (OUTPATIENT)
Dept: LAB | Facility: CLINIC | Age: 48
End: 2023-09-28
Payer: COMMERCIAL

## 2023-09-28 DIAGNOSIS — K59.00 CONSTIPATION, UNSPECIFIED CONSTIPATION TYPE: ICD-10-CM

## 2023-09-28 DIAGNOSIS — R79.0 LOW FERRITIN: ICD-10-CM

## 2023-09-28 DIAGNOSIS — E04.1 THYROID NODULE: ICD-10-CM

## 2023-09-28 LAB
ANION GAP SERPL CALCULATED.3IONS-SCNC: 8 MMOL/L (ref 7–15)
BUN SERPL-MCNC: 11.7 MG/DL (ref 6–20)
CALCIUM SERPL-MCNC: 9.1 MG/DL (ref 8.6–10)
CHLORIDE SERPL-SCNC: 102 MMOL/L (ref 98–107)
CREAT SERPL-MCNC: 0.67 MG/DL (ref 0.51–0.95)
EGFRCR SERPLBLD CKD-EPI 2021: >90 ML/MIN/1.73M2
FERRITIN SERPL-MCNC: 41 NG/ML (ref 6–175)
GLUCOSE SERPL-MCNC: 99 MG/DL (ref 70–99)
HCO3 SERPL-SCNC: 27 MMOL/L (ref 22–29)
MAGNESIUM SERPL-MCNC: 2.2 MG/DL (ref 1.7–2.3)
POTASSIUM SERPL-SCNC: 4.2 MMOL/L (ref 3.4–5.3)
SODIUM SERPL-SCNC: 137 MMOL/L (ref 135–145)
T3 SERPL-MCNC: 95 NG/DL (ref 85–202)
T4 FREE SERPL-MCNC: 1.06 NG/DL (ref 0.9–1.7)
TSH SERPL DL<=0.005 MIU/L-ACNC: 1.13 UIU/ML (ref 0.3–4.2)

## 2023-09-28 PROCEDURE — 80048 BASIC METABOLIC PNL TOTAL CA: CPT

## 2023-09-28 PROCEDURE — 84439 ASSAY OF FREE THYROXINE: CPT

## 2023-09-28 PROCEDURE — 84443 ASSAY THYROID STIM HORMONE: CPT

## 2023-09-28 PROCEDURE — 83735 ASSAY OF MAGNESIUM: CPT

## 2023-09-28 PROCEDURE — 84480 ASSAY TRIIODOTHYRONINE (T3): CPT

## 2023-09-28 PROCEDURE — 36415 COLL VENOUS BLD VENIPUNCTURE: CPT

## 2023-09-28 PROCEDURE — 82728 ASSAY OF FERRITIN: CPT

## 2023-10-03 ENCOUNTER — ANCILLARY PROCEDURE (OUTPATIENT)
Dept: ULTRASOUND IMAGING | Facility: CLINIC | Age: 48
End: 2023-10-03
Attending: FAMILY MEDICINE
Payer: COMMERCIAL

## 2023-10-03 ENCOUNTER — RADIOLOGY INJECTION OFFICE VISIT (OUTPATIENT)
Dept: PALLIATIVE MEDICINE | Facility: CLINIC | Age: 48
End: 2023-10-03
Attending: FAMILY MEDICINE
Payer: COMMERCIAL

## 2023-10-03 VITALS — OXYGEN SATURATION: 98 % | DIASTOLIC BLOOD PRESSURE: 84 MMHG | HEART RATE: 73 BPM | SYSTOLIC BLOOD PRESSURE: 144 MMHG

## 2023-10-03 DIAGNOSIS — M54.16 BILATERAL LUMBAR RADICULOPATHY: ICD-10-CM

## 2023-10-03 DIAGNOSIS — M54.16 LUMBAR RADICULOPATHY: ICD-10-CM

## 2023-10-03 DIAGNOSIS — E04.1 THYROID NODULE: Primary | ICD-10-CM

## 2023-10-03 PROCEDURE — 88172 CYTP DX EVAL FNA 1ST EA SITE: CPT | Mod: 26 | Performed by: PATHOLOGY

## 2023-10-03 PROCEDURE — 88173 CYTOPATH EVAL FNA REPORT: CPT | Mod: 26 | Performed by: PATHOLOGY

## 2023-10-03 PROCEDURE — 88173 CYTOPATH EVAL FNA REPORT: CPT | Mod: TC | Performed by: FAMILY MEDICINE

## 2023-10-03 PROCEDURE — 62323 NJX INTERLAMINAR LMBR/SAC: CPT | Performed by: PAIN MEDICINE

## 2023-10-03 PROCEDURE — 10005 FNA BX W/US GDN 1ST LES: CPT | Performed by: STUDENT IN AN ORGANIZED HEALTH CARE EDUCATION/TRAINING PROGRAM

## 2023-10-03 RX ORDER — LIDOCAINE HYDROCHLORIDE 10 MG/ML
5 INJECTION, SOLUTION INFILTRATION; PERINEURAL ONCE
Status: COMPLETED | OUTPATIENT
Start: 2023-10-03 | End: 2023-10-03

## 2023-10-03 RX ADMIN — LIDOCAINE HYDROCHLORIDE 3 ML: 10 INJECTION, SOLUTION INFILTRATION; PERINEURAL at 10:31

## 2023-10-03 RX ADMIN — TRIAMCINOLONE ACETONIDE 40 MG: 40 INJECTION, SUSPENSION INTRA-ARTICULAR; INTRAMUSCULAR at 09:56

## 2023-10-03 ASSESSMENT — PAIN SCALES - GENERAL
PAINLEVEL: NO PAIN (1)
PAINLEVEL: MILD PAIN (3)

## 2023-10-03 NOTE — DISCHARGE INSTRUCTIONS
Directions for after your Thyroid Fine Needle Aspiration:    You can remove your bandage within a few hours.  The site of the biopsy may be sore for a day or two after the procedure. You can take over-the-counter pain medicine if needed.  Notify your doctor if you have any of the following:  Fever above 101 degrees  Swelling in the area of the biopsy  Redness or leaking from the biopsy site  Your doctor will notify you of the results in 2-3 days.

## 2023-10-03 NOTE — NURSING NOTE
Pre-procedure Intake  If YES to any questions or NO to having a   Please complete laminated checklist and leave on the computer keyboard for Provider, verbally inform provider if able.    For SCS Trial, RFA's or any sedation procedure:  Have you been fasting? NA  If yes, for how long? NA    Are you taking any any blood thinners such as Coumadin, Warfarin, Jantoven, Pradaxa Xarelto, Eliquis, Edoxaban, Enoxaparin, Lovenox, Heparin, Arixtra, Fondaparinux, or Fragmin? OR Antiplatelet medication such as Plavix, Brilinta, or Effient?   No   If yes, when did you take your last dose? NA    Do you take aspirin?  No  If cervical procedure, have you held aspirin for 6 days?   NA    Do you have any allergies to contrast dye, iodine, steroid and/or numbing medications?  NO    Are you currently taking antibiotics or have an active infection?  NO    Have you had a fever/elevated temperature within the past week? NO    Are you currently taking oral steroids? NO    Do you have a ? Yes    Are you pregnant or breastfeeding?  NO    Have you received the COVID-19 vaccine? Yes  If yes, was it your 1st, 2nd or only dose needed? 4  Date of most recent vaccine: 1/24/23    Notify provider and RNs if systolic BP >170, diastolic BP >100, P >100 or O2 sats < 90%    Maia Trivedi CMA (AAMA)

## 2023-10-03 NOTE — PATIENT INSTRUCTIONS
[facility]   Procedure Discharge Instructions    Today you saw:    Dr. Remberto Srivastava       You had an:  Epidural steroid injection       Be cautious when walking. Numbness and/or weakness in the lower extremities may occur for up to 6-8 hours after the procedure due to effect of the local anesthetic  Do not drive for 6 hours. The effect of the local anesthetic could slow your reflexes.   You may resume your regular activities after 24 hours  Avoid strenuous activity for the first 24 hours  You may shower, however avoid swimming, tub baths or hot tubs for 24 hours following your procedure  You may have a mild to moderate increase in pain for several days following the injection.  It may take up to 14 days for the steroid medication to start working although you may feel the effect as early as a few days after the procedure.     You may use ice packs for 10-15 minutes, 3 to 4 times a day at the injection site for comfort  Do not use heat to painful areas for 6 to 8 hours. This will give the local anesthetic time to wear off and prevent you from accidentally burning your skin.   Unless you have been directed to avoid the use of anti-inflammatory medications (NSAIDS), you may use medications such as ibuprofen, Aleve or Tylenol for pain control if needed.   If you have diabetes, check your blood sugar more frequently than usual as your blood sugar may be higher than normal for 10-14 days following a steroid injection. Contact your doctor who manages your diabetes if your blood sugar is higher than usual  Possible side effects of steroids that you may experience include flushing, elevated blood pressure, increased appetite, mild headaches and restlessness.  All of these symptoms will get better with time.  If you experience any of the following, call the Pain Clinic during work hours (Mon-Friday 8-4:30 pm) at [phone] or the Provider Line after hours at [phone]:  -Fever over 100  degree F  -Swelling, bleeding, redness, drainage, warmth at the injection site  -Progressive weakness or numbness in your legs   -Loss of bowel or bladder function  -Unusual new onset of pain that is not improving

## 2023-10-03 NOTE — NURSING NOTE
Discharge Information    IV Discontiued Time:  NA    Amount of Fluid Infused:  NA    Discharge Criteria = When patient returns to baseline or as per MD order    Consciousness:  Pt is fully awake    Circulation:  BP +/- 20% of pre-procedure level    Respiration:  Patient is able to breathe deeply    O2 Sat:  Patient is able to maintain O2 Sat >92% on room air    Activity:  Moves 4 extremities on command    Ambulation:  Patient is able to stand and walk or stand and pivot into wheelchair    Dressing:  Clean/dry or No Dressing    Notes:   Discharge instructions and AVS given to patient    Patient meets criteria for discharge?  YES    Admitted to PCU?  No    Responsible adult present to accompany patient home?  Yes    Signature/Title:    Lela Jacobson RN  RN Care Coordinator  [facility] [place]

## 2023-10-04 LAB
PATH REPORT.COMMENTS IMP SPEC: NORMAL
PATH REPORT.FINAL DX SPEC: NORMAL
PATH REPORT.GROSS SPEC: NORMAL
PATH REPORT.MICROSCOPIC SPEC OTHER STN: NORMAL
PATH REPORT.RELEVANT HX SPEC: NORMAL

## 2023-10-04 RX ORDER — TRIAMCINOLONE ACETONIDE 40 MG/ML
40 INJECTION, SUSPENSION INTRA-ARTICULAR; INTRAMUSCULAR ONCE
Status: COMPLETED | OUTPATIENT
Start: 2023-10-03 | End: 2023-10-03

## 2023-10-04 NOTE — PROGRESS NOTES
Pre procedure Diagnosis: Lumbar radiculopathy  Post procedure Diagnosis: Same  Procedure performed: L5-S1 interlaminar epidural steroid injection   Anesthesia: none  Complications: none  Operators: Remberto Srivastava MD     Indications:   Shala Morales is a 48 year old female.  The patient has a history of lbp rad to her buttocks.  Examination shows neg slump.  she has tried conservative treatment including pmeds.    MRI rev  Options/alternatives, benefits and risks were discussed with the patient including but not limited to bleeding, infection, no pain relief, tissue trauma, exposure to radiation, reaction to medications, spinal cord injury, dural puncture, weakness, numbness and headache.  Questions were answered to her satisfaction and she wishes to proceed. Voluntary informed consent was obtained and signed.     Vitals were reviewed: Yes  Allergies were reviewed:  Yes   Medications were reviewed:  Yes   Pre-procedure pain score: 3/10    Procedure:  The patient's medical history, medications, and allergies were reviewed and reconciled.  After obtaining signed informed consent, the patient was brought into the procedure suite and was placed in a prone position on the procedure table.   A Pause for the Cause was performed.  Patient was prepped and draped in the usual sterile fashion.     The L5-s1 interspace was identified with AP fluoroscopy.  A total of 3ml of 1% lidocaine was used to anesthetize the skin and subcutaneous tissues for a  midline approach.    A 20gauge 3.5inch Touhy needle was advanced utilizing intermittent AP and Lateral fluoroscopy and air for loss of resistance.  The epidural space was encountered on the first pass without difficulties.  Aspiration for blood and CSF was negative.  Needle position was verified by injecting 1 ml of Omnipaque utilizing real-time fluoroscopy that showed good needle placement and epidural spread without signs of intravascular or intrathecal uptake.  Omnipaque  wasted:  9 ml.    Then, after repeated negative aspiration for blood or CSF, a combination of Kenalog 40 mg, Bupivicaine 0.25% 2 ml, diluted with 3 ml of normal saline to a total injectate volume of 6 ml was injected into the epidural space in a slow and incremental fashion and the needle was restyletted and withdrawn.  All injected medications were preservative free.    The injection site was cleaned and a sterile dressing was applied.    The patient tolerated the procedure well without complications and was taken to the recovery room for continued observation.    Images were saved to PACS.    Post-procedure pain score: 1/10  Follow-up includes:   -f/u with referring provider     Remberto Srivastava MD  [facility] [place]

## 2023-10-06 NOTE — PROGRESS NOTES
Patient calls last night.  Had a procedure at the pain service.  Was noting some pain on the other side of the procedure.  Did not have bowel or bladder changes.  No weakness.  We discussed red flag symptoms.  She will go in today for further evaluation if needed otherwise follow-up with provider today

## 2023-10-09 ENCOUNTER — THERAPY VISIT (OUTPATIENT)
Dept: PHYSICAL THERAPY | Facility: CLINIC | Age: 48
End: 2023-10-09
Attending: FAMILY MEDICINE
Payer: COMMERCIAL

## 2023-10-09 DIAGNOSIS — M54.16 BILATERAL LUMBAR RADICULOPATHY: Primary | ICD-10-CM

## 2023-10-09 PROCEDURE — 97140 MANUAL THERAPY 1/> REGIONS: CPT | Mod: GP | Performed by: PHYSICAL THERAPIST

## 2023-10-09 PROCEDURE — 97110 THERAPEUTIC EXERCISES: CPT | Mod: GP | Performed by: PHYSICAL THERAPIST

## 2023-10-09 NOTE — TELEPHONE ENCOUNTER
Please see if patient is looking for an appointment sooner with endocrinology?  If so we can try to refer her to an outside group.

## 2023-10-11 NOTE — TELEPHONE ENCOUNTER
LMTCB. Please ask patient if she wants to try an outside group for endocrine per providers message below.

## 2023-10-13 ENCOUNTER — TELEPHONE (OUTPATIENT)
Dept: PALLIATIVE MEDICINE | Facility: CLINIC | Age: 48
End: 2023-10-13
Payer: COMMERCIAL

## 2023-10-13 NOTE — TELEPHONE ENCOUNTER
"[facility] Call Center    Phone Message    May a detailed message be left on voicemail: yes     Reason for Call: Other: Patient recently had injections and in the last 3 days she has been having urgency/\"almost\" incontinence issues. She wanted to inform the provider and speak to a nurse regarding it. Please call back when available.      Action Taken: Other: Pain    Travel Screening: Not Applicable                                                                   "

## 2023-10-13 NOTE — TELEPHONE ENCOUNTER
The patient is 10 days out from her CHINTAN. I do not think these symptoms are secondary to anything concerning the injection.  I reviewed the images and everything looks normal in terms of contrast spread and Dr. Srivastava did not note any complications with the injection in his note.  I would encourage her to go to urgent care to be evaluated for a UTI.      Shanae Cantrell MD

## 2023-10-13 NOTE — TELEPHONE ENCOUNTER
"Called pt  regarding report of possible side effects following 10/3/2023 L5-S1 interlaminar epidural steroid injection       Since the injection is there:  Any new numbness/tingling?: Yes. Details: \"In the top of my foot\"  Right foot.   Any weakness?  No  Any bowel or bladder issues?: Yes. Details: Increased urgency. And \"bladder discomfort... lower abdomen\"   Any New pain areas?: No.   Signs of infection? No  Fever/chills:  No  Pain relief from the injection? Yes. Details: \"I have noticed less back pain and for sure in the foot.\" Reports plantar facitis pain has subsided.   New radiating pain? No  Routing to interventionists to review.  Lela Jacobson RN  RN Care Coordinator  [facility]          "

## 2023-10-13 NOTE — TELEPHONE ENCOUNTER
Communicated recommendation to patient who verbalizes understanding.     Lela Jacobson RN  [facility]  [phone]

## 2023-10-16 NOTE — TELEPHONE ENCOUNTER
LMTCB. Please relay Dr. Elliott's message below to patient. Predikthart message also sent to patient.

## 2023-10-24 ENCOUNTER — VIRTUAL VISIT (OUTPATIENT)
Dept: NEUROLOGY | Facility: CLINIC | Age: 48
End: 2023-10-24
Payer: COMMERCIAL

## 2023-10-24 DIAGNOSIS — R20.2 PARESTHESIAS: Primary | ICD-10-CM

## 2023-10-24 DIAGNOSIS — G62.9 PERIPHERAL POLYNEUROPATHY: ICD-10-CM

## 2023-10-24 PROCEDURE — 99214 OFFICE O/P EST MOD 30 MIN: CPT | Mod: VID | Performed by: PSYCHIATRY & NEUROLOGY

## 2023-10-24 ASSESSMENT — PAIN SCALES - GENERAL: PAINLEVEL: NO PAIN (0)

## 2023-10-24 NOTE — PROGRESS NOTES
[facility] Neurology Follow Up Visit    Shala Morales MRN# [id number]   Age: 48 year old YOB: 1975     Brief history of symptoms: The patient was initially seen in neurologic consultation on 4/9/2021 and most recently 8/8/2023 for evaluation of peripheral pelvic pain. Please see the comprehensive neurologic consultation notes from those dates in the Epic records for details.     Prior evaluations which were pertinent:  - Normal EMG 7/13/2021  - MRI lumbar spine 5/1/2021 with some mild neuroforaminal narrowing and spondylosis at L3-S1.  - Serum studies were normal 6/15/2021  - Seen with pain management 2/9/2022 where her symptoms were described as deep vaginal pain, left lower quadrants pain to perineal and rectal pain.  She was to consider ganglion impar block, and see a pain psychologist.   - Seen with sports medicine 2/14/2023 for suspected right sided sciatica and right lower back pain.  She was to utilize PT, and ibuprofen.    At our last visit, she described sciatica pain on her right side, as well as pins and needles in her knees, upper buttock, feet and arms.  Given the expansion of sensory symptoms being reported, she was to obtain imaging of her brain and spine as well as a repeat EMG to better determine a possible central or peripheral source.    Interval history:   - MRI brain, cervical spine, thoracic spine 9/17/2023 were normal upon review. There was some mild microvascular changes of the brain (supratentorial subcortical bifrontal, right subinsular) w/out enhancement.  There was a finding of prominent CSF filled subarachnoid space of the left intraorbital optic nerve.  There was no lesion or anatomical abnormality of the cervical or thoracic spine.  There was mention of a thyroid nodule which led to recommendation to obtain US head and neck with soft tissues and then follow up with her PCP for further management.    Today, the patient is having no new symptoms.  She is not having much for  symptoms of her arms anymore. She had an injection in L5-S1 region and this has improved some of her radiating pain of the leg to foot.       Physical Exam:   General: Seated comfortably in no acute distress.  Neurologic:     Mental Status: Fully alert, attentive and oriented. Speech clear and fluent, no paraphasic errors.     Cranial Nerves: EOMI with normal smooth pursuit. Facial movements symmetric. Hearing not formally tested but intact to conversation.  No dysarthria.     Motor: No tremors or other abnormal movements observed.            Assessment and Plan:   Assessment:  Microvascular changes of the brain, limited/normal    The patient's brain imaging is relatively normal, and her T2 hyperintense lesiosn seem confined to supra-tentorial regions of the subcortical white matter without signs of enhancement or black hole. I suspect these are microvascular changes over demyelinating conditions, but would want to have repeat imaging in one year to make sure these finding aren't the beginning of some sort of demyelinating process.   Otherwise, the patient's pain is actively being managed through her PCP and this provider could take over her duloxetine dose for dysesthesias at this time.     We discussed that she could hold off on her EMG at this time given improvement of symptoms and active management through a pain clinic. If new weakness, sensory loss, or pain occurred, then the EMG should be repeated.     Plan:  - PCP can take over with duloxetine management  - Repeat imaging in one year (MRI brain w/wout contrast)    Follow up in Neurology clinic in one year, or earlier as needed should new concerns arise.    DONATO Zhou D.O.   of Neurology    Total time today (25 min) in this patient encounter was spent on pre-charting, counseling and/or coordination of care.

## 2023-10-24 NOTE — PROGRESS NOTES
Virtual Visit Details    Type of service:  Video Visit     Originating Location (pt. Location): Home    Distant Location (provider location):  On-site  Platform used for Video Visit: Hyacinth

## 2023-10-24 NOTE — NURSING NOTE
Is the patient currently in the state of [place]? YES    Visit mode:VIDEO    If the visit is dropped, the patient can be reconnected by: VIDEO VISIT: Send to e-mail at: [email or web address]    Will anyone else be joining the visit? NO  (If patient encounters technical issues they should call [phone]    How would you like to obtain your AVS? MyChart    Are changes needed to the allergy or medication list? Pt stated no changes to allergies and Pt stated no med changes    Reason for visit: RECHECK (Nerve issue in feet)    Tayler Pepper VVF

## 2023-10-24 NOTE — LETTER
10/24/2023       RE: Shala Morales  [address]  [facility] [address]     Dear Colleague,    Thank you for referring your patient, Shala Morales, to the [facility] [place] at [facility]. Please see a copy of my visit note below.    [facility] Neurology Follow Up Visit    Shala Morales MRN# [id number]   Age: 48 year old YOB: 1975     Brief history of symptoms: The patient was initially seen in neurologic consultation on 4/9/2021 and most recently 8/8/2023 for evaluation of peripheral pelvic pain. Please see the comprehensive neurologic consultation notes from those dates in the Epic records for details.     Prior evaluations which were pertinent:  - Normal EMG 7/13/2021  - MRI lumbar spine 5/1/2021 with some mild neuroforaminal narrowing and spondylosis at L3-S1.  - Serum studies were normal 6/15/2021  - Seen with pain management 2/9/2022 where her symptoms were described as deep vaginal pain, left lower quadrants pain to perineal and rectal pain.  She was to consider ganglion impar block, and see a pain psychologist.   - Seen with sports medicine 2/14/2023 for suspected right sided sciatica and right lower back pain.  She was to utilize PT, and ibuprofen.    At our last visit, she described sciatica pain on her right side, as well as pins and needles in her knees, upper buttock, feet and arms.  Given the expansion of sensory symptoms being reported, she was to obtain imaging of her brain and spine as well as a repeat EMG to better determine a possible central or peripheral source.    Interval history:   - MRI brain, cervical spine, thoracic spine 9/17/2023 were normal upon review. There was some mild microvascular changes of the brain (supratentorial subcortical bifrontal, right subinsular) w/out enhancement.  There was a finding of prominent CSF filled subarachnoid space of the left intraorbital optic nerve.   There was no lesion or anatomical abnormality of the cervical or thoracic spine.  There was mention of a thyroid nodule which led to recommendation to obtain US head and neck with soft tissues and then follow up with her PCP for further management.    Today, the patient is having no new symptoms.  She is not having much for symptoms of her arms anymore. She had an injection in L5-S1 region and this has improved some of her radiating pain of the leg to foot.       Physical Exam:   General: Seated comfortably in no acute distress.  Neurologic:     Mental Status: Fully alert, attentive and oriented. Speech clear and fluent, no paraphasic errors.     Cranial Nerves: EOMI with normal smooth pursuit. Facial movements symmetric. Hearing not formally tested but intact to conversation.  No dysarthria.     Motor: No tremors or other abnormal movements observed.            Assessment and Plan:   Assessment:  Microvascular changes of the brain, limited/normal    The patient's brain imaging is relatively normal, and her T2 hyperintense lesiosn seem confined to supra-tentorial regions of the subcortical white matter without signs of enhancement or black hole. I suspect these are microvascular changes over demyelinating conditions, but would want to have repeat imaging in one year to make sure these finding aren't the beginning of some sort of demyelinating process.   Otherwise, the patient's pain is actively being managed through her PCP and this provider could take over her duloxetine dose for dysesthesias at this time.     We discussed that she could hold off on her EMG at this time given improvement of symptoms and active management through a pain clinic. If new weakness, sensory loss, or pain occurred, then the EMG should be repeated.     Plan:  - PCP can take over with duloxetine management  - Repeat imaging in one year (MRI brain w/wout contrast)    Follow up in Neurology clinic in one year, or earlier as needed should new  concerns arise.      Total time today (25 min) in this patient encounter was spent on pre-charting, counseling and/or coordination of care.       Again, thank you for allowing me to participate in the care of your patient.      Sincerely,    Stefan Zhou, DO

## 2023-11-13 ENCOUNTER — ANCILLARY PROCEDURE (OUTPATIENT)
Dept: MAMMOGRAPHY | Facility: CLINIC | Age: 48
End: 2023-11-13
Attending: FAMILY MEDICINE
Payer: COMMERCIAL

## 2023-11-13 DIAGNOSIS — Z12.31 VISIT FOR SCREENING MAMMOGRAM: ICD-10-CM

## 2023-11-13 PROCEDURE — 77067 SCR MAMMO BI INCL CAD: CPT | Mod: TC | Performed by: RADIOLOGY

## 2023-11-13 PROCEDURE — 77063 BREAST TOMOSYNTHESIS BI: CPT | Mod: TC | Performed by: RADIOLOGY

## 2023-11-14 ENCOUNTER — THERAPY VISIT (OUTPATIENT)
Dept: PHYSICAL THERAPY | Facility: CLINIC | Age: 48
End: 2023-11-14
Payer: COMMERCIAL

## 2023-11-14 DIAGNOSIS — M54.16 BILATERAL LUMBAR RADICULOPATHY: Primary | ICD-10-CM

## 2023-11-14 PROCEDURE — 97112 NEUROMUSCULAR REEDUCATION: CPT | Mod: GP | Performed by: PHYSICAL THERAPIST

## 2023-11-14 PROCEDURE — 97110 THERAPEUTIC EXERCISES: CPT | Mod: GP | Performed by: PHYSICAL THERAPIST

## 2024-01-04 ENCOUNTER — PATIENT OUTREACH (OUTPATIENT)
Dept: CARE COORDINATION | Facility: CLINIC | Age: 49
End: 2024-01-04
Payer: COMMERCIAL

## 2024-01-18 ENCOUNTER — PATIENT OUTREACH (OUTPATIENT)
Dept: CARE COORDINATION | Facility: CLINIC | Age: 49
End: 2024-01-18
Payer: COMMERCIAL

## 2024-01-23 NOTE — CONFIDENTIAL NOTE
RECORDS RECEIVED FROM: internal    DATE RECEIVED: 2.12.24    NOTES (FOR ALL VISITS) STATUS DETAILS   OFFICE NOTES from referring provider internal  Mariluz Cage MD    OFFICE NOTES from other specialist internal  9.26.23 Avinash      MEDICATION LIST internal     IMAGING      MRI (BRAIN) internal  9.17.23   ULTRASOUND (HEAD/NECK) internal  Biopsy thyroid fna- 10.3.23    Head/neck-- 9.25.23   LABS     DIABETES: HBGA1C, CREATININE, FASTING LIPIDS, MICROALBUMIN URINE, POTASSIUM, TSH, T4    THYROID: TSH, T4, CBC, THYRODLONULIN, TOTAL T3, FREE T4, CALCITONIN, CEA internal  FNA- 10.3.23  TSH- 9.28.23  BMP- 9.28.23  T4- 9.28.23  T3- 9.28.23  CMP- 1.24.23  Lipid- 1.24.23  Cbc- 1.24.23

## 2024-02-05 PROBLEM — M54.16 BILATERAL LUMBAR RADICULOPATHY: Status: RESOLVED | Noted: 2023-02-21 | Resolved: 2024-02-05

## 2024-02-06 ENCOUNTER — LAB REQUISITION (OUTPATIENT)
Dept: LAB | Facility: CLINIC | Age: 49
End: 2024-02-06

## 2024-02-06 DIAGNOSIS — N95.1 MENOPAUSAL AND FEMALE CLIMACTERIC STATES: ICD-10-CM

## 2024-02-06 DIAGNOSIS — Z13.1 ENCOUNTER FOR SCREENING FOR DIABETES MELLITUS: ICD-10-CM

## 2024-02-06 LAB
HBA1C MFR BLD: 5.8 %
HOLD SPECIMEN: NORMAL

## 2024-02-06 PROCEDURE — 82670 ASSAY OF TOTAL ESTRADIOL: CPT | Performed by: OBSTETRICS & GYNECOLOGY

## 2024-02-06 PROCEDURE — 83036 HEMOGLOBIN GLYCOSYLATED A1C: CPT | Performed by: OBSTETRICS & GYNECOLOGY

## 2024-02-06 PROCEDURE — 83001 ASSAY OF GONADOTROPIN (FSH): CPT | Performed by: OBSTETRICS & GYNECOLOGY

## 2024-02-07 LAB
ESTRADIOL SERPL-MCNC: 7 PG/ML
FSH SERPL IRP2-ACNC: 85.9 MIU/ML

## 2024-02-12 ENCOUNTER — PRE VISIT (OUTPATIENT)
Dept: ENDOCRINOLOGY | Facility: CLINIC | Age: 49
End: 2024-02-12

## 2024-02-12 ENCOUNTER — VIRTUAL VISIT (OUTPATIENT)
Dept: ENDOCRINOLOGY | Facility: CLINIC | Age: 49
End: 2024-02-12
Payer: COMMERCIAL

## 2024-02-12 VITALS — WEIGHT: 200 LBS | BODY MASS INDEX: 29.97 KG/M2

## 2024-02-12 DIAGNOSIS — R63.5 WEIGHT GAIN: ICD-10-CM

## 2024-02-12 PROCEDURE — 99205 OFFICE O/P NEW HI 60 MIN: CPT | Mod: 95 | Performed by: STUDENT IN AN ORGANIZED HEALTH CARE EDUCATION/TRAINING PROGRAM

## 2024-02-12 RX ORDER — PROGESTERONE 100 MG/1
CAPSULE ORAL
COMMUNITY
Start: 2024-02-06

## 2024-02-12 RX ORDER — ESTRADIOL 0.04 MG/D
PATCH, EXTENDED RELEASE TRANSDERMAL
COMMUNITY
Start: 2024-02-10

## 2024-02-12 SDOH — HEALTH STABILITY: PHYSICAL HEALTH: ON AVERAGE, HOW MANY MINUTES DO YOU ENGAGE IN EXERCISE AT THIS LEVEL?: 40 MIN

## 2024-02-12 SDOH — HEALTH STABILITY: PHYSICAL HEALTH: ON AVERAGE, HOW MANY DAYS PER WEEK DO YOU ENGAGE IN MODERATE TO STRENUOUS EXERCISE (LIKE A BRISK WALK)?: 3 DAYS

## 2024-02-12 ASSESSMENT — SOCIAL DETERMINANTS OF HEALTH (SDOH): HOW OFTEN DO YOU GET TOGETHER WITH FRIENDS OR RELATIVES?: ONCE A WEEK

## 2024-02-12 NOTE — NURSING NOTE
Is the patient currently in the state of [place]? YES    Visit mode:VIDEO    If the visit is dropped, the patient can be reconnected by: VIDEO VISIT: Text to cell phone:   Telephone Information:   Mobile [phone]       Will anyone else be joining the visit? NO  (If patient encounters technical issues they should call [phone])    How would you like to obtain your AVS? MyChart    Are changes needed to the allergy or medication list? No    Reason for visit: Video Visit and Consult    Shruti PRADO

## 2024-02-12 NOTE — PROGRESS NOTES
Endocrinology Clinic Visit 2/12/2024      Video-Visit Details    Type of service:  Video Visit  Joined the call at 2/12/2024, 9:07:55 am.  Left the call at 2/12/2024, 9:44:10 am.    Originating Location (pt. Location): Home        Distant Location (provider location):  Off-site    Mode of Communication:  Video Conference via [employer]    Physician has received verbal consent for a Video Visit from the patient? Yes    I spent a total of 60 minutes on the date of encounter reviewing medical records, evaluating the patient, coordinating care and documenting in the EHR, as detailed above.      NAME:  Shala Morales  PCP:  Quin Da Silva  MRN:  [id number]  Reason for Consult:  concern for thyroid dysfunction  Requesting Provider:  Mariluz Potter    Chief Complaint     Chief Complaint   Patient presents with    Video Visit    Consult       History of Present Illness     Shala Morales is a 48 year old female who is seen in video visit for concern of thyroid dysfunction    Symptoms: started 8606-7223.  Excessive wt gain, 50 lbs in the last 5 years, without any lifestyles changes. She said she gained 20 lbs since last year.  Wt Readings from Last 4 Encounters:   02/12/24 90.7 kg (200 lb)   02/14/23 88 kg (194 lb)   01/24/23 88 kg (194 lb)   01/17/23 88.6 kg (195 lb 6.4 oz)         Joint muscle pain  Constipation, chronic but worse  Abdominal bloating  Hair loss, dry skin, dry eyes, rosacea   Her menses has became irregular and long over the past 3 years. She had regular menses before that. Her LMP 5/2023.  She has been experiencing hot flashes and mood swings.  She started HRT yesterday by her obggyn.      She is on cymbalta for nerve pain, weaning her self off. She was also on gabapentin which she stopped.     She has had normal thyroid labs over the years. Most recent TFT 9/2023 TSH 1.13, TT3 95 AND ft4 1.06    She was also recently found to have MNG while completing work up/imaging for MS. Multiple small  nodules and 2 dominant left nodules:    Nodule 4: A 1.2 x 0.7 x 0.5 cm nodule in the superior left lobe.  Composition: Solid or almost completely solid, 2 points   Echogenicity: Hypoechoic, 2 points   Shape: Taller-than-wide, 3 points   Margin: Ill-defined, 0 points   Echogenic Foci: None, or large comet-tail artifacts, 0 points   Point Total: 7 points or more. TI-RADS 5. If 1 cm or larger, recommend  FNA; if 0.5 cm or larger, follow up US annually for 5 years.     Nodule 5: A 1.5 x 1.1 x 0.7 cm nodule in the posterior aspect of the  upper-mid left lobe.  Composition: Solid or almost completely solid, 2 points   Echogenicity: Hypoechoic, 2 points   Shape: Taller-than-wide, 3 points   Margin: Smooth, 0 points   Echogenic Foci: None, or large comet-tail artifacts, 0 points   Point Total: 7 points or more. TI-RADS 5. If 1 cm or larger, recommend  FNA; if 0.5 cm or larger, follow up US annually for 5 years.       FNA of nodule 5 on 10/2023 benign.  FNA of nodule 4 was aborted , it was not seen on repeat US.    Family hx: mother had goiter s/p surgery benign.     Social: she is remote alcohol addict, sober for 12 years. She quit smoking 4 years. No illicit drugs or recreational drugs.    Problem List     Patient Active Problem List   Diagnosis    HPV in female    Symptomatic menopausal or female climacteric states    Biliary sludge determined by ultrasound    History of hypertension    History of depression    History of alcoholism (H)    Intermittent right upper quadrant abdominal pain    Lung granuloma (H)    Vegan diet    Hyperlipidemia LDL goal <130        Medications     Current Outpatient Medications   Medication    azelaic acid (FINACIA) 15 % external gel    crisaborole (EUCRISA) 2 % ointment    Cyanocobalamin (VITAMIN B 12 PO)    diazepam 10 mg SUPP    DULoxetine (CYMBALTA) 60 MG capsule    estradiol (ESTRACE) 0.1 MG/GM vaginal cream    lidocaine (XYLOCAINE) 2 % external gel    metroNIDAZOLE (METROCREAM) 0.75 %  external cream    vitamin D3 (CHOLECALCIFEROL) 2000 units (50 mcg) tablet     No current facility-administered medications for this visit.     Facility-Administered Medications Ordered in Other Visits   Medication    sodium chloride (PF) 0.9% PF flush 60 mL        Allergies     No Known Allergies    Medical / Surgical History     Past Medical History:   Diagnosis Date    Depressive disorder     Gastroesophageal reflux disease     History of alcoholism (H) 2018    History of depression 2018    HPV in female 2018    HTN (hypertension)     Motion sickness      Past Surgical History:   Procedure Laterality Date    BIOPSY  2018    Cervical biopsy    COLONOSCOPY N/A 2022    Procedure: COLONOSCOPY, FLEXIBLE, WITH LESION REMOVAL USING SNARE;  Surgeon: Samson Menjivar MD;  Location: [facility] OR    COLONOSCOPY WITH CO2 INSUFFLATION N/A 2022    Procedure: COLONOSCOPY, WITH CO2 INSUFFLATION;  Surgeon: Samson Menjivar MD;  Location: [facility] OR       Social History     Social History     Socioeconomic History    Marital status:      Spouse name: Not on file    Number of children: Not on file    Years of education: Not on file    Highest education level: Not on file   Occupational History    Not on file   Tobacco Use    Smoking status: Former     Packs/day: 1.50     Years: 15.00     Additional pack years: 0.00     Total pack years: 22.50     Types: Cigarettes     Start date: 1994     Quit date: 10/1/2019     Years since quittin.3    Smokeless tobacco: Never    Tobacco comments:     Stopped smoking cigarettes  but then started using E cigarette which I then quit using    Vaping Use    Vaping Use: Never used   Substance and Sexual Activity    Alcohol use: Not Currently     Comment: Recovering alcoholic, sobriety date 3/21/2012    Drug use: No    Sexual activity: Not Currently     Partners: Male     Birth control/protection: Condom   Other Topics Concern    Parent/sibling w/  CABG, MI or angioplasty before 65F 55M? No   Social History Narrative    Not on file     Social Determinants of Health     Financial Resource Strain: Low Risk  (9/26/2023)    Financial Resource Strain     Within the past 12 months, have you or your family members you live with been unable to get utilities (heat, electricity) when it was really needed?: No   Food Insecurity: Low Risk  (9/26/2023)    Food Insecurity     Within the past 12 months, did you worry that your food would run out before you got money to buy more?: No     Within the past 12 months, did the food you bought just not last and you didn t have money to get more?: No   Transportation Needs: Low Risk  (9/26/2023)    Transportation Needs     Within the past 12 months, has lack of transportation kept you from medical appointments, getting your medicines, non-medical meetings or appointments, work, or from getting things that you need?: No   Physical Activity: Not on file   Stress: Not on file   Social Connections: Not on file   Interpersonal Safety: Not on file   Housing Stability: Low Risk  (9/26/2023)    Housing Stability     Do you have housing? : Yes     Are you worried about losing your housing?: No       Family History     Family History   Problem Relation Age of Onset    Thyroid Disease Mother     Hypertension Mother     Diabetes Father     Hypertension Father     Depression Father     Mental Illness Father     Obesity Father     Thyroid Disease Maternal Grandmother     Hypertension Maternal Grandmother     Hypertension Maternal Grandfather     Hypertension Paternal Grandmother     Diabetes Paternal Grandfather     Prostate Cancer Paternal Grandfather     Hypertension Paternal Grandfather        ROS     12 ROS completed, pertinent positive and negative in HPI    Physical Exam   Wt 90.7 kg (200 lb)   BMI 29.97 kg/m     GENERAL: alert and no distress  EYES: Eyes grossly normal to inspection.  No discharge or erythema, or obvious  "scleral/conjunctival abnormalities.  RESP: No audible wheeze, cough, or visible cyanosis.    SKIN: Visible skin clear. No significant rash, abnormal pigmentation or lesions.  NEURO: Cranial nerves grossly intact.  Mentation and speech appropriate for age.  PSYCH: Appropriate affect, tone, and pace of words     Labs/Imaging     Pertinent Labs were reviewed and updated in EPIC and discussed briefly.  Radiology Results were  reviewed and updated in EPIC and discussed briefly.    Summary of recent findings:   Lab Results   Component Value Date    A1C 5.8 02/06/2024       TSH   Date Value Ref Range Status   09/28/2023 1.13 0.30 - 4.20 uIU/mL Final   01/24/2023 1.24 0.40 - 4.00 mU/L Final   01/19/2022 1.23 0.40 - 4.00 mU/L Final   12/23/2019 1.08 0.40 - 4.00 mU/L Final   04/22/2019 1.36 0.40 - 4.00 mU/L Final     Free T4   Date Value Ref Range Status   09/28/2023 1.06 0.90 - 1.70 ng/dL Final       Creatinine   Date Value Ref Range Status   09/28/2023 0.67 0.51 - 0.95 mg/dL Final       Recent Labs   Lab Test 01/24/23  0855 01/19/22  0946   CHOL 164 170   HDL 71 79   LDL 81 84   TRIG 60 36       No results found for: \"[id number]\", \"[id number]\", \"[id number]\"    I personally reviewed the patient's outside records from [facility] EMR and Care Everywhere. Summary of pertinent findings in HPI.    Impression / Plan     1. Concern for thyroid dysfunction  She has multiple nonspecific symptoms that were concerning for thyroid dysfunction. She had normal TSH over the years. Most recetn TFT panel normal on 9/2023. We reviewed thyroid labs and I explained to her that her sx are not due to the thyroid, with the labs being our objective data to rely on. No need to check antibodies as this will not change our approach and management.    2. MNG  S/p FNA of dominant left thyroid nodule. Recommend follow up US 9/2024 to be ordered by PCP. If stable space out US check to 2-3 years.    3. Post menopausal sx  Following with obgyn outside clinic. " Started on HRT.    Test and/or medications prescribed today:  No orders of the defined types were placed in this encounter.        Follow up: RITA Gotti MD  Endocrinology, Diabetes and Metabolism  [facility]

## 2024-02-12 NOTE — LETTER
2/12/2024       RE: Shala Morales  [address]  [facility] [address]     Dear Colleague,    Thank you for referring your patient, Shala Morales, to the [facility] ENDOCRINOLOGY CLINIC [place] at [facility]. Please see a copy of my visit note below.    Endocrinology Clinic Visit 2/12/2024      Video-Visit Details    Type of service:  Video Visit  Joined the call at 2/12/2024, 9:07:55 am.  Left the call at 2/12/2024, 9:44:10 am.    Originating Location (pt. Location): Home        Distant Location (provider location):  Off-site    Mode of Communication:  Video Conference via [facility]    Physician has received verbal consent for a Video Visit from the patient? Yes    I spent a total of 60 minutes on the date of encounter reviewing medical records, evaluating the patient, coordinating care and documenting in the EHR, as detailed above.      NAME:  Shala Morales  PCP:  Quin Da Silva  MRN:  [id number]  Reason for Consult:  concern for thyroid dysfunction  Requesting Provider:  Mariluz Potter    Chief Complaint     Chief Complaint   Patient presents with    Video Visit    Consult       History of Present Illness     Shala Morales is a 48 year old female who is seen in video visit for concern of thyroid dysfunction    Symptoms: started 5125-6906.  Excessive wt gain, 50 lbs in the last 5 years, without any lifestyles changes. She said she gained 20 lbs since last year.  Wt Readings from Last 4 Encounters:   02/12/24 90.7 kg (200 lb)   02/14/23 88 kg (194 lb)   01/24/23 88 kg (194 lb)   01/17/23 88.6 kg (195 lb 6.4 oz)         Joint muscle pain  Constipation, chronic but worse  Abdominal bloating  Hair loss, dry skin, dry eyes, rosacea   Her menses has became irregular and long over the past 3 years. She had regular menses before that. Her LMP 5/2023.  She has been experiencing hot flashes and mood swings.  She started HRT yesterday by  her obggyn.      She is on cymbalta for nerve pain, weaning her self off. She was also on gabapentin which she stopped.     She has had normal thyroid labs over the years. Most recent TFT 9/2023 TSH 1.13, TT3 95 AND ft4 1.06    She was also recently found to have MNG while completing work up/imaging for MS. Multiple small nodules and 2 dominant left nodules:    Nodule 4: A 1.2 x 0.7 x 0.5 cm nodule in the superior left lobe.  Composition: Solid or almost completely solid, 2 points   Echogenicity: Hypoechoic, 2 points   Shape: Taller-than-wide, 3 points   Margin: Ill-defined, 0 points   Echogenic Foci: None, or large comet-tail artifacts, 0 points   Point Total: 7 points or more. TI-RADS 5. If 1 cm or larger, recommend  FNA; if 0.5 cm or larger, follow up US annually for 5 years.     Nodule 5: A 1.5 x 1.1 x 0.7 cm nodule in the posterior aspect of the  upper-mid left lobe.  Composition: Solid or almost completely solid, 2 points   Echogenicity: Hypoechoic, 2 points   Shape: Taller-than-wide, 3 points   Margin: Smooth, 0 points   Echogenic Foci: None, or large comet-tail artifacts, 0 points   Point Total: 7 points or more. TI-RADS 5. If 1 cm or larger, recommend  FNA; if 0.5 cm or larger, follow up US annually for 5 years.       FNA of nodule 5 on 10/2023 benign.  FNA of nodule 4 was aborted , it was not seen on repeat US.    Family hx: mother had goiter s/p surgery benign.     Social: she is remote alcohol addict, sober for 12 years. She quit smoking 4 years. No illicit drugs or recreational drugs.    Problem List     Patient Active Problem List   Diagnosis    HPV in female    Symptomatic menopausal or female climacteric states    Biliary sludge determined by ultrasound    History of hypertension    History of depression    History of alcoholism (H)    Intermittent right upper quadrant abdominal pain    Lung granuloma (H)    Vegan diet    Hyperlipidemia LDL goal <130        Medications     Current Outpatient  Medications   Medication    azelaic acid (FINACIA) 15 % external gel    crisaborole (EUCRISA) 2 % ointment    Cyanocobalamin (VITAMIN B 12 PO)    diazepam 10 mg SUPP    DULoxetine (CYMBALTA) 60 MG capsule    estradiol (ESTRACE) 0.1 MG/GM vaginal cream    lidocaine (XYLOCAINE) 2 % external gel    metroNIDAZOLE (METROCREAM) 0.75 % external cream    vitamin D3 (CHOLECALCIFEROL) 2000 units (50 mcg) tablet     No current facility-administered medications for this visit.     Facility-Administered Medications Ordered in Other Visits   Medication    sodium chloride (PF) 0.9% PF flush 60 mL        Allergies     No Known Allergies    Medical / Surgical History     Past Medical History:   Diagnosis Date    Depressive disorder     Gastroesophageal reflux disease     History of alcoholism (H) 2018    History of depression 2018    HPV in female 2018    HTN (hypertension)     Motion sickness      Past Surgical History:   Procedure Laterality Date    BIOPSY  2018    Cervical biopsy    COLONOSCOPY N/A 2022    Procedure: COLONOSCOPY, FLEXIBLE, WITH LESION REMOVAL USING SNARE;  Surgeon: Samson Menjivar MD;  Location: [facility] OR    COLONOSCOPY WITH CO2 INSUFFLATION N/A 2022    Procedure: COLONOSCOPY, WITH CO2 INSUFFLATION;  Surgeon: Samson Menjivar MD;  Location: [facility] OR       Social History     Social History     Socioeconomic History    Marital status:      Spouse name: Not on file    Number of children: Not on file    Years of education: Not on file    Highest education level: Not on file   Occupational History    Not on file   Tobacco Use    Smoking status: Former     Packs/day: 1.50     Years: 15.00     Additional pack years: 0.00     Total pack years: 22.50     Types: Cigarettes     Start date: 1994     Quit date: 10/1/2019     Years since quittin.3    Smokeless tobacco: Never    Tobacco comments:     Stopped smoking cigarettes  but then started using E cigarette which I  then quit using 2/19   Vaping Use    Vaping Use: Never used   Substance and Sexual Activity    Alcohol use: Not Currently     Comment: Recovering alcoholic, sobriety date 3/21/2012    Drug use: No    Sexual activity: Not Currently     Partners: Male     Birth control/protection: Condom   Other Topics Concern    Parent/sibling w/ CABG, MI or angioplasty before 65F 55M? No   Social History Narrative    Not on file     Social Determinants of Health     Financial Resource Strain: Low Risk  (9/26/2023)    Financial Resource Strain     Within the past 12 months, have you or your family members you live with been unable to get utilities (heat, electricity) when it was really needed?: No   Food Insecurity: Low Risk  (9/26/2023)    Food Insecurity     Within the past 12 months, did you worry that your food would run out before you got money to buy more?: No     Within the past 12 months, did the food you bought just not last and you didn t have money to get more?: No   Transportation Needs: Low Risk  (9/26/2023)    Transportation Needs     Within the past 12 months, has lack of transportation kept you from medical appointments, getting your medicines, non-medical meetings or appointments, work, or from getting things that you need?: No   Physical Activity: Not on file   Stress: Not on file   Social Connections: Not on file   Interpersonal Safety: Not on file   Housing Stability: Low Risk  (9/26/2023)    Housing Stability     Do you have housing? : Yes     Are you worried about losing your housing?: No       Family History     Family History   Problem Relation Age of Onset    Thyroid Disease Mother     Hypertension Mother     Diabetes Father     Hypertension Father     Depression Father     Mental Illness Father     Obesity Father     Thyroid Disease Maternal Grandmother     Hypertension Maternal Grandmother     Hypertension Maternal Grandfather     Hypertension Paternal Grandmother     Diabetes Paternal Grandfather      "Prostate Cancer Paternal Grandfather     Hypertension Paternal Grandfather        ROS     12 ROS completed, pertinent positive and negative in HPI    Physical Exam   Wt 90.7 kg (200 lb)   BMI 29.97 kg/m     GENERAL: alert and no distress  EYES: Eyes grossly normal to inspection.  No discharge or erythema, or obvious scleral/conjunctival abnormalities.  RESP: No audible wheeze, cough, or visible cyanosis.    SKIN: Visible skin clear. No significant rash, abnormal pigmentation or lesions.  NEURO: Cranial nerves grossly intact.  Mentation and speech appropriate for age.  PSYCH: Appropriate affect, tone, and pace of words     Labs/Imaging     Pertinent Labs were reviewed and updated in EPIC and discussed briefly.  Radiology Results were  reviewed and updated in EPIC and discussed briefly.    Summary of recent findings:   Lab Results   Component Value Date    A1C 5.8 02/06/2024       TSH   Date Value Ref Range Status   09/28/2023 1.13 0.30 - 4.20 uIU/mL Final   01/24/2023 1.24 0.40 - 4.00 mU/L Final   01/19/2022 1.23 0.40 - 4.00 mU/L Final   12/23/2019 1.08 0.40 - 4.00 mU/L Final   04/22/2019 1.36 0.40 - 4.00 mU/L Final     Free T4   Date Value Ref Range Status   09/28/2023 1.06 0.90 - 1.70 ng/dL Final       Creatinine   Date Value Ref Range Status   09/28/2023 0.67 0.51 - 0.95 mg/dL Final       Recent Labs   Lab Test 01/24/23  0855 01/19/22  0946   CHOL 164 170   HDL 71 79   LDL 81 84   TRIG 60 36       No results found for: \"[id number]\", \"[id number]\", \"[id number]\"    I personally reviewed the patient's outside records from [facility] EMR and Care Everywhere. Summary of pertinent findings in HPI.    Impression / Plan     1. Concern for thyroid dysfunction  She has multiple nonspecific symptoms that were concerning for thyroid dysfunction. She had normal TSH over the years. Most recetn TFT panel normal on 9/2023. We reviewed thyroid labs and I explained to her that her sx are not due to the thyroid, with the labs being our " objective data to rely on. No need to check antibodies as this will not change our approach and management.    2. MNG  S/p FNA of dominant left thyroid nodule. Recommend follow up US 9/2024 to be ordered by PCP. If stable space out US check to 2-3 years.    3. Post menopausal sx  Following with obgyn outside clinic. Started on HRT.    Test and/or medications prescribed today:  No orders of the defined types were placed in this encounter.      Follow up: RITA Gotti MD  Endocrinology, Diabetes and Metabolism  [facility]

## 2024-02-13 ENCOUNTER — OFFICE VISIT (OUTPATIENT)
Dept: FAMILY MEDICINE | Facility: CLINIC | Age: 49
End: 2024-02-13
Payer: COMMERCIAL

## 2024-02-13 VITALS
HEART RATE: 75 BPM | OXYGEN SATURATION: 98 % | SYSTOLIC BLOOD PRESSURE: 128 MMHG | BODY MASS INDEX: 30.21 KG/M2 | DIASTOLIC BLOOD PRESSURE: 86 MMHG | HEIGHT: 69 IN | RESPIRATION RATE: 16 BRPM | WEIGHT: 204 LBS | TEMPERATURE: 97.3 F

## 2024-02-13 DIAGNOSIS — K59.04 CHRONIC IDIOPATHIC CONSTIPATION: ICD-10-CM

## 2024-02-13 DIAGNOSIS — E04.1 THYROID NODULE: ICD-10-CM

## 2024-02-13 DIAGNOSIS — R63.5 WEIGHT GAIN: ICD-10-CM

## 2024-02-13 DIAGNOSIS — Z00.00 ENCOUNTER FOR ROUTINE ADULT HEALTH EXAMINATION WITHOUT ABNORMAL FINDINGS: Primary | ICD-10-CM

## 2024-02-13 DIAGNOSIS — G58.8 PUDENDAL NEURALGIA: ICD-10-CM

## 2024-02-13 PROCEDURE — 99396 PREV VISIT EST AGE 40-64: CPT | Performed by: PHYSICIAN ASSISTANT

## 2024-02-13 PROCEDURE — 99214 OFFICE O/P EST MOD 30 MIN: CPT | Mod: 25 | Performed by: PHYSICIAN ASSISTANT

## 2024-02-13 RX ORDER — POLYETHYLENE GLYCOL 3350 17 G/17G
POWDER, FOR SOLUTION ORAL
COMMUNITY
Start: 2023-11-01

## 2024-02-13 RX ORDER — ZINC GLUCONATE 50 MG
TABLET ORAL
COMMUNITY
Start: 2023-04-01

## 2024-02-13 RX ORDER — LIFITEGRAST 50 MG/ML
SOLUTION/ DROPS OPHTHALMIC
COMMUNITY
Start: 2023-05-01

## 2024-02-13 RX ORDER — DULOXETIN HYDROCHLORIDE 30 MG/1
30 CAPSULE, DELAYED RELEASE ORAL DAILY
Qty: 90 CAPSULE | Refills: 0 | Status: SHIPPED | OUTPATIENT
Start: 2024-02-13 | End: 2024-05-06

## 2024-02-13 RX ORDER — CETIRIZINE HYDROCHLORIDE 10 MG/1
1 TABLET ORAL DAILY
COMMUNITY

## 2024-02-13 RX ORDER — KETOCONAZOLE 20 MG/ML
SHAMPOO TOPICAL
COMMUNITY

## 2024-02-13 RX ORDER — VARENICLINE 0.03 MG/.05ML
SPRAY NASAL
COMMUNITY
Start: 2023-10-01

## 2024-02-13 NOTE — PATIENT INSTRUCTIONS
[email or web address]  Macro Calculator  MyFitness Pal or MyMacros - apps to track food    Matheus Rice ([employer]): [phone]    Linzess and Amatiza - prescriptions for constipation

## 2024-02-13 NOTE — PROGRESS NOTES
"Preventive Care Visit  [facility] Cervantes PA-C, Family Medicine  Feb 13, 2024    Assessment & Plan       ICD-10-CM    1. Encounter for routine adult health examination without abnormal findings  Z00.00       2. 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 Weight gain  R63.5       5. Chronic idiopathic constipation  K59.04            1) Screenings discussed    2) Annual thyroid US x5 years. Due in September, order placed for future.    3) Will try decreasing Cymbalta to 30mg daily. She would like to wean off of Cymbalta. She'll let me know when she's ready to go down to 20mg daily.     4) Recently had thyroid levels checked, normal. Likely hormonal and dietary. Discussed diet in length today.     5) Also discussed ways to prevent constipation. It's really about finding what works for her individually. She'll let me know if she'd like to try Amatiza or Linzess.     Patient Instructions   [facility]  Macro Calculator  [employer] Pal or [employer] softwareros - apps to track food    Matheus Rice ([employer]): [phone]    Linzess and Amatiza - prescriptions for constipation      Prescription drug management    BMI  Estimated body mass index is 29.87 kg/m  as calculated from the following:    Height as of this encounter: 1.76 m (5' 9.29\").    Weight as of this encounter: 92.5 kg (204 lb).     Counseling  Appropriate preventive services were discussed with this patient, including applicable screening as appropriate for fall prevention, nutrition, physical activity, Tobacco-use cessation, weight loss and cognition.  Checklist reviewing preventive services available has been given to the patient.  Reviewed patient's diet, addressing concerns and/or questions.   She is at risk for lack of exercise and has been provided with information to increase physical activity for the benefit of her well-being.   She is at risk for " psychosocial distress and has been provided with information to reduce risk.     Return in about 1 year (around 2/13/2025) for your annual physical, a med check, fasting labs, with Rachel, in person.        Teri Last is a 48 year old, presenting for the following:  Physical        2/13/2024    10:26 AM   Additional Questions   Roomed by Wendi   Accompanied by Self         2/13/2024    10:26 AM   Patient Reported Additional Medications   Patient reports taking the following new medications na        Health Care Directive  Patient does not have a Health Care Directive or Living Will: Discussed advance care planning with patient; information given to patient to review.    HPI    Patient arrived for Annual Physical, not due for PAP.   Patient is fasting for lab work.     Pt would like to discuss weaning off Cymbalta.   Started on it for pudendal nerve pain  Tapered off of gabapentin    Pt requesting follow-up Ultrasound order for Nodules in September 2024 if possible.     Weight gain          2/12/2024   General Health   How would you rate your overall physical health? (!) FAIR   Feel stress (tense, anxious, or unable to sleep) Only a little   (!) STRESS CONCERN      2/12/2024   Nutrition   Three or more servings of calcium each day? Yes   Diet: Vegetarian/vegan    Breakfast skipped   How many servings of fruit and vegetables per day? (!) 2-3   How many sweetened beverages each day? 0-1         2/12/2024   Exercise   Days per week of moderate/strenous exercise 3 days   Average minutes spent exercising at this level 40 min         2/12/2024   Social Factors   Frequency of gathering with friends or relatives Once a week   Worry food won't last until get money to buy more No   Food not last or not have enough money for food? No   Do you have housing?  Yes   Are you worried about losing your housing? No   Lack of transportation? No   Unable to get utilities (heat,electricity)? No         2/12/2024   Dental    Dentist two times every year? Yes          No data to display                          2024   Substance Use   Alcohol more than 3/day or more than 7/wk Not Applicable   Do you use any other substances recreationally? No     Social History     Tobacco Use    Smoking status: Former     Packs/day: 1.50     Years: 15.00     Additional pack years: 0.00     Total pack years: 22.50     Types: Cigarettes     Start date: 1994     Quit date: 10/1/2019     Years since quittin.3    Smokeless tobacco: Never    Tobacco comments:     Stopped smoking cigarettes  but then started using E cigarette which I then quit using    Vaping Use    Vaping Use: Never used   Substance Use Topics    Alcohol use: Not Currently     Comment: Recovering alcoholic, sobriety date 3/21/2012    Drug use: No           2023   LAST FHS-7 RESULTS   1st degree relative breast or ovarian cancer No   Any relative bilateral breast cancer No   Any male have breast cancer No   Any woman have breast and ovarian cancer No   Any woman with breast cancer before 50yrs No   2 or more relatives with breast and/or ovarian cancer No   2 or more relatives with breast and/or bowel cancer No        Mammogram Screening - Mammogram every 1-2 years updated in Health Maintenance based on mutual decision making        2024   STI Screening   New sexual partner(s) since last STI/HIV test? No     History of abnormal Pap smear: NO - age 30-65 PAP every 5 years with negative HPV co-testing recommended        3/21/2021    12:08 PM   PAP / HPV   PAP-ABSTRACT See Scanned Document           This result is from an external source.     The 10-year ASCVD risk score (Hair MAURICIO, et al., 2019) is: 0.5%    Values used to calculate the score:      Age: 48 years      Sex: Female      Is Non- : No      Diabetic: No      Tobacco smoker: No      Systolic Blood Pressure: 128 mmHg      Is BP treated: No      HDL Cholesterol: 71 mg/dL      Total  "Cholesterol: 164 mg/dL        2/12/2024   Contraception/Family Planning   Questions about contraception or family planning No        Reviewed and updated as needed this visit by Provider                        Review of Systems  Constitutional, neuro, ENT, endocrine, pulmonary, cardiac, gastrointestinal, genitourinary, musculoskeletal, integument and psychiatric systems are negative, except as otherwise noted.     Objective    Exam  /86 (BP Location: Left arm, Patient Position: Chair, Cuff Size: Adult Large)   Pulse 75   Temp 97.3  F (36.3  C) (Tympanic)   Resp 16   Ht 1.76 m (5' 9.29\")   Wt 92.5 kg (204 lb)   SpO2 98%   BMI 29.87 kg/m     Estimated body mass index is 29.87 kg/m  as calculated from the following:    Height as of this encounter: 1.76 m (5' 9.29\").    Weight as of this encounter: 92.5 kg (204 lb).    Physical Exam  GENERAL: alert and no distress  EYES: Eyes grossly normal to inspection  HENT: ear canals and TM's normal, nose and mouth without ulcers or lesions  NECK: no adenopathy, no asymmetry, masses, or scars  RESP: lungs clear to auscultation - no rales, rhonchi or wheezes  CV: regular rates and rhythm, normal S1 S2, no S3 or S4, and no murmur, click or rub  ABDOMEN: soft, nontender, no hepatosplenomegaly, no masses and bowel sounds normal  MS: no gross musculoskeletal defects noted, no edema  SKIN: no suspicious lesions or rashes  NEURO: Normal strength and tone, mentation intact and speech normal  PSYCH: mentation appears normal, affect normal/bright      Signed Electronically by: Rachel Cervantes PA-C    "

## 2024-03-11 ENCOUNTER — LAB REQUISITION (OUTPATIENT)
Dept: LAB | Facility: CLINIC | Age: 49
End: 2024-03-11

## 2024-03-11 DIAGNOSIS — Z12.4 ENCOUNTER FOR SCREENING FOR MALIGNANT NEOPLASM OF CERVIX: ICD-10-CM

## 2024-03-11 PROCEDURE — 87624 HPV HI-RISK TYP POOLED RSLT: CPT | Performed by: OBSTETRICS & GYNECOLOGY

## 2024-03-11 PROCEDURE — G0145 SCR C/V CYTO,THINLAYER,RESCR: HCPCS | Performed by: OBSTETRICS & GYNECOLOGY

## 2024-03-15 LAB
BKR LAB AP GYN ADEQUACY: NORMAL
BKR LAB AP GYN INTERPRETATION: NORMAL
BKR LAB AP HPV REFLEX: NORMAL
BKR LAB AP LMP: NORMAL
BKR LAB AP PREVIOUS ABNL DX: NORMAL
BKR LAB AP PREVIOUS ABNORMAL: NORMAL
PATH REPORT.COMMENTS IMP SPEC: NORMAL
PATH REPORT.COMMENTS IMP SPEC: NORMAL
PATH REPORT.RELEVANT HX SPEC: NORMAL

## 2024-03-18 LAB
HUMAN PAPILLOMA VIRUS 16 DNA: NEGATIVE
HUMAN PAPILLOMA VIRUS 18 DNA: NEGATIVE
HUMAN PAPILLOMA VIRUS FINAL DIAGNOSIS: NORMAL
HUMAN PAPILLOMA VIRUS OTHER HR: NEGATIVE

## 2024-03-25 ENCOUNTER — OFFICE VISIT (OUTPATIENT)
Dept: ORTHOPEDICS | Facility: CLINIC | Age: 49
End: 2024-03-25
Payer: COMMERCIAL

## 2024-03-25 ENCOUNTER — OFFICE VISIT (OUTPATIENT)
Dept: FAMILY MEDICINE | Facility: CLINIC | Age: 49
End: 2024-03-25
Payer: COMMERCIAL

## 2024-03-25 VITALS
TEMPERATURE: 97.8 F | DIASTOLIC BLOOD PRESSURE: 78 MMHG | HEART RATE: 80 BPM | SYSTOLIC BLOOD PRESSURE: 130 MMHG | RESPIRATION RATE: 18 BRPM | HEIGHT: 68 IN | WEIGHT: 199 LBS | BODY MASS INDEX: 30.16 KG/M2 | OXYGEN SATURATION: 98 %

## 2024-03-25 VITALS
SYSTOLIC BLOOD PRESSURE: 125 MMHG | WEIGHT: 204 LBS | BODY MASS INDEX: 30.21 KG/M2 | HEIGHT: 69 IN | DIASTOLIC BLOOD PRESSURE: 85 MMHG | HEART RATE: 75 BPM

## 2024-03-25 DIAGNOSIS — R07.9 LEFT-SIDED CHEST PAIN: Primary | ICD-10-CM

## 2024-03-25 DIAGNOSIS — R07.9 CHEST PAIN, UNSPECIFIED TYPE: Primary | ICD-10-CM

## 2024-03-25 PROCEDURE — 99215 OFFICE O/P EST HI 40 MIN: CPT | Mod: 25 | Performed by: PHYSICIAN ASSISTANT

## 2024-03-25 PROCEDURE — 99213 OFFICE O/P EST LOW 20 MIN: CPT | Performed by: PEDIATRICS

## 2024-03-25 PROCEDURE — 93000 ELECTROCARDIOGRAM COMPLETE: CPT | Performed by: PHYSICIAN ASSISTANT

## 2024-03-25 RX ORDER — ASPIRIN 81 MG/1
324 TABLET, CHEWABLE ORAL ONCE
Status: COMPLETED | OUTPATIENT
Start: 2024-03-25 | End: 2024-03-25

## 2024-03-25 RX ADMIN — ASPIRIN 324 MG: 81 TABLET, CHEWABLE ORAL at 15:34

## 2024-03-25 ASSESSMENT — PAIN SCALES - GENERAL: PAINLEVEL: SEVERE PAIN (6)

## 2024-03-25 NOTE — PATIENT INSTRUCTIONS
PLEASE GO TO [facility] EMERGENCY ROOM IN [facility] RAPIDS IMMEDIATELY FOR EVALUATION. CALL 911 IF YOU HAVE WORSENING/CHANGING SYMPTOMS AT ANY TIME OR IF YOU CHANGE YOUR MIND ABOUT AMBULANCE TRANSPORT.

## 2024-03-25 NOTE — LETTER
3/25/2024         RE: Shala Morales  [address]  [place] [address]        Dear Colleague,    Thank you for referring your patient, Shala Morales, to the [facility] SPORTS MEDICINE CLINIC [facility]. Please see a copy of my visit note below.    ASSESSMENT & PLAN    Shala was seen today for pain.    Diagnoses and all orders for this visit:    Left-sided chest pain      This issue is acute and Unchanged.  Given no injury, concern for cardiac or lung etiology (PE?). Vitals are stable. Reviewed with primary care clinic downstairs who can see patient today for initial evaluation (?EKG) and determine further disposition.    Plan:  - Today's Plan of Care:  Andre Cowart PA-C will see you today downstairs    -We also discussed other future treatment options:  Rehab: Physical Therapy if other etiologies are ruled out.    Follow Up: as needed    Concerning signs and symptoms were reviewed and all questions were answered at this time.    Lela Sotomayor MD [facility]  Sports Medicine Physician  [facility]    -----  Chief Complaint   Patient presents with     Chest - Pain       SUBJECTIVE  Shala Morales is a/an 49 year old female who is seen as a self referral for evaluation of left chest pain.  - Started yesterday, no injury. Dull ache and getting worse. No shortness of breath, no palpitations, no thumping of the chest, no numbness or tingling in the face or the arm.      The patient is seen by themselves.    Onset: 2 day(s) ago. Reports insidious onset without acute precipitating event.  Location of Pain: left sided; chest, pectoralis major/minor  Worsened by: reaching out, abduction with elbow extension, leaning forward, bending forward, getting into her car, breathing, coughing   Better with: nothing   Treatments tried: ice, ibuprofen, other medications: Celexa, and home stretches  Associated symptoms: very direct and intense pain, travels along the breast area into the  "armpit    Orthopedic/Surgical history: NO  Social History/Occupation: working in dermatology, LPN       REVIEW OF SYSTEMS:  Review of Systems    OBJECTIVE:  /85   Pulse 75   Ht 1.753 m (5' 9\")   Wt 92.5 kg (204 lb)   BMI 30.13 kg/m     General: healthy, alert and in no distress  Skin: no suspicious lesions or rash.  CV: distal perfusion intact   Resp: normal respiratory effort without conversational dyspnea   Psych: normal mood and affect  Gait: NORMAL  Neuro: Normal light sensory exam of upper extremity    Some tenderness to palpation left chest, normal shoulder ROM, some increase in pain    RADIOLOGY:  none        Discussion of management or test interpretation with external physician/other qualified healthcare professional/appropriate source - Primary Care Clinic             Again, thank you for allowing me to participate in the care of your patient.        Sincerely,        Leal Sotomayor MD  "

## 2024-03-25 NOTE — PROGRESS NOTES
"  Assessment & Plan   Problem List Items Addressed This Visit    None  Visit Diagnoses       Chest pain    -  Primary    Relevant Orders    EKG 12-lead complete w/read - Clinics (Completed)    Chest pain, unspecified type        Relevant Medications    aspirin (ASA) chewable tablet 324 mg (Completed)           Impression is left-sided chest pain from unknown etiology.  Favor musculoskeletal cause, however cannot rule out PE as she is currently on exogenous estrogen.  This could also be an atypical presentation of ACS, though she was a former tobacco user and is overweight.  EKG was nonischemic, however there was no other tracing available for comparison.  Patient was given 324 mg of aspirin.  I spoke with Dr. Foy in the [place] acute diagnostic services clinic and they are unable to see the patient today.  Patient agreed to go to the emergency department at [facility] for evaluation.    Complete history and physical exam as below. Afebrile with normal vital signs.    DDx discussed with and explained to the pt to their satisfaction.  All questions were answered at this time. Pt expressed understanding of and agreement with this plan. Will go to the Emergency Department immediately and call 911 if symptoms worsen or new concerning symptoms arise en route for EMS transport. Patient left in no apparent distress.     Ordering of each unique test  38 minutes spent by me on the date of the encounter doing chart review, history and exam, documentation and further activities per the note     BMI  Estimated body mass index is 29.95 kg/m  as calculated from the following:    Height as of this encounter: 1.736 m (5' 8.35\").    Weight as of this encounter: 90.3 kg (199 lb).     See Patient Instructions      Teri Last is a 49 year old, presenting for the following health issues:  Chest Pain        3/25/2024     2:42 PM   Additional Questions   Roomed by Tyrone Garland CMA   Accompanied by N/A         " "3/25/2024     2:42 PM   Patient Reported Additional Medications   Patient reports taking the following new medications No new medications     Chest Pain      Patient is coming in today with chest pain that started on Sunday and got worse throughout yesterday and today.  She has no history of this happening, but is currently doing hormone replacement therapy.  She experiences no SOB, n/v, hemoptysis, leg pain/swelling, fever, or other symptoms. Progressively worsening. No known injury or new activity. Worse with deep breaths and movement of left arm. No recent immobilization, personal/family history of VTE, ACS. Former tobacco user.     Review of Systems  Constitutional, neuro, ENT, endocrine, pulmonary, cardiac, gastrointestinal, genitourinary, musculoskeletal, integument and psychiatric systems are negative, except as otherwise noted.      Objective    /78   Pulse 80   Temp 97.8  F (36.6  C) (Temporal)   Resp 18   Ht 1.736 m (5' 8.35\")   Wt 90.3 kg (199 lb)   LMP  (LMP Unknown)   SpO2 98%   BMI 29.95 kg/m    Body mass index is 29.95 kg/m .  Physical Exam  Vitals and nursing note reviewed.   Constitutional:       General: She is not in acute distress.     Appearance: She is not ill-appearing or diaphoretic.   HENT:      Head: Normocephalic and atraumatic.      Mouth/Throat:      Mouth: Mucous membranes are moist.   Eyes:      Conjunctiva/sclera: Conjunctivae normal.   Cardiovascular:      Rate and Rhythm: Normal rate and regular rhythm.      Heart sounds: Normal heart sounds. No murmur heard.     No friction rub. No gallop.      Comments: 2+ symmetric radial/PT pulses. No LE edema or tenderness.  Pulmonary:      Effort: Pulmonary effort is normal. No respiratory distress.      Breath sounds: Normal breath sounds. No stridor. No wheezing, rhonchi or rales.   Chest:      Chest wall: Tenderness (left upper chest tenderness consistent with chest pain symptoms) present.   Skin:     General: Skin is warm and " dry.   Neurological:      General: No focal deficit present.      Mental Status: She is alert. Mental status is at baseline.   Psychiatric:         Mood and Affect: Mood normal.         Behavior: Behavior normal.          EKG - Reviewed and interpreted by me Sinus Rhythm with a junctional rhythm, normal axis, normal intervals aside from short KY, no acute ST/T changes c/w ischemia, no LVH by voltage criteria, there are no prior tracings available        Signed Electronically by: ANA Benson

## 2024-03-25 NOTE — PATIENT INSTRUCTIONS
Plan:  - Today's Plan of Care:  Andre Cowart PA-C will see you today    -We also discussed other future treatment options:  Rehab: Physical Therapy    Follow Up: as needed    If you have any further questions for your physician or physician s care team you can call [phone] and use option 3 to leave a voice message.

## 2024-03-25 NOTE — PROGRESS NOTES
"ASSESSMENT & PLAN    Shala was seen today for pain.    Diagnoses and all orders for this visit:    Left-sided chest pain      This issue is acute and Unchanged.  Given no injury, concern for cardiac or lung etiology (PE?). Vitals are stable. Reviewed with primary care clinic downstairs who can see patient today for initial evaluation (?EKG) and determine further disposition.    Plan:  - Today's Plan of Care:  Andre Cowart PA-C will see you today downstairs    -We also discussed other future treatment options:  Rehab: Physical Therapy if other etiologies are ruled out.    Follow Up: as needed    Concerning signs and symptoms were reviewed and all questions were answered at this time.    Lela Sotomayor MD [facility]  Sports Medicine Physician  [facility]    -----  Chief Complaint   Patient presents with    Chest - Pain       SUBJECTIVE  Shala Morales is a/an 49 year old female who is seen as a self referral for evaluation of left chest pain.  - Started yesterday, no injury. Dull ache and getting worse. No shortness of breath, no palpitations, no thumping of the chest, no numbness or tingling in the face or the arm.      The patient is seen by themselves.    Onset: 2 day(s) ago. Reports insidious onset without acute precipitating event.  Location of Pain: left sided; chest, pectoralis major/minor  Worsened by: reaching out, abduction with elbow extension, leaning forward, bending forward, getting into her car, breathing, coughing   Better with: nothing   Treatments tried: ice, ibuprofen, other medications: Celexa, and home stretches  Associated symptoms: very direct and intense pain, travels along the breast area into the armpit    Orthopedic/Surgical history: NO  Social History/Occupation: working in dermatology, LPN       REVIEW OF SYSTEMS:  Review of Systems    OBJECTIVE:  /85   Pulse 75   Ht 1.753 m (5' 9\")   Wt 92.5 kg (204 lb)   BMI 30.13 kg/m     General: healthy, alert and in no " distress  Skin: no suspicious lesions or rash.  CV: distal perfusion intact   Resp: normal respiratory effort without conversational dyspnea   Psych: normal mood and affect  Gait: NORMAL  Neuro: Normal light sensory exam of upper extremity    Some tenderness to palpation left chest, normal shoulder ROM, some increase in pain    RADIOLOGY:  none        Discussion of management or test interpretation with external physician/other qualified healthcare professional/appropriate source - Primary Care Clinic

## 2024-05-05 DIAGNOSIS — G58.8 PUDENDAL NEURALGIA: ICD-10-CM

## 2024-05-06 RX ORDER — DULOXETIN HYDROCHLORIDE 30 MG/1
30 CAPSULE, DELAYED RELEASE ORAL DAILY
Qty: 90 CAPSULE | Refills: 0 | Status: SHIPPED | OUTPATIENT
Start: 2024-05-06 | End: 2024-07-31

## 2024-07-29 ENCOUNTER — THERAPY VISIT (OUTPATIENT)
Dept: PHYSICAL THERAPY | Facility: CLINIC | Age: 49
End: 2024-07-29
Payer: COMMERCIAL

## 2024-07-29 ENCOUNTER — OFFICE VISIT (OUTPATIENT)
Dept: PHYSICAL MEDICINE AND REHAB | Facility: CLINIC | Age: 49
End: 2024-07-29
Payer: COMMERCIAL

## 2024-07-29 VITALS
WEIGHT: 204 LBS | SYSTOLIC BLOOD PRESSURE: 142 MMHG | BODY MASS INDEX: 30.7 KG/M2 | HEART RATE: 73 BPM | DIASTOLIC BLOOD PRESSURE: 81 MMHG

## 2024-07-29 DIAGNOSIS — M51.26 LUMBAR DISC HERNIATION: ICD-10-CM

## 2024-07-29 DIAGNOSIS — M54.16 LUMBAR RADICULAR PAIN: Primary | ICD-10-CM

## 2024-07-29 DIAGNOSIS — M48.061 FORAMINAL STENOSIS OF LUMBAR REGION: ICD-10-CM

## 2024-07-29 DIAGNOSIS — M54.16 LUMBAR RADICULAR PAIN: ICD-10-CM

## 2024-07-29 DIAGNOSIS — M79.18 MYOFASCIAL PAIN: ICD-10-CM

## 2024-07-29 PROCEDURE — 99203 OFFICE O/P NEW LOW 30 MIN: CPT | Performed by: PHYSICAL MEDICINE & REHABILITATION

## 2024-07-29 PROCEDURE — 97112 NEUROMUSCULAR REEDUCATION: CPT | Mod: GP | Performed by: PHYSICAL THERAPIST

## 2024-07-29 PROCEDURE — 97161 PT EVAL LOW COMPLEX 20 MIN: CPT | Mod: GP | Performed by: PHYSICAL THERAPIST

## 2024-07-29 ASSESSMENT — PAIN SCALES - GENERAL: PAINLEVEL: MILD PAIN (2)

## 2024-07-29 NOTE — PROGRESS NOTES
Assessment/Plan:      hSala was seen today for back pain.    Diagnoses and all orders for this visit:    Lumbar radicular pain  -     Physical Therapy  Referral; Future  -     XR Lumbar Flex/Ext 2/3 Views; Future    Lumbar disc herniation  -     Physical Therapy  Referral; Future    Foraminal stenosis of lumbar region  -     Physical Therapy  Referral; Future    Myofascial pain  -     Physical Therapy  Referral; Future    Other orders  -     Spine  Referral         Assessment: Pleasant 49 year old female with a history of hypertension, depression, hypothyroidism, history of alcohol use with:    1.  Lumbar spine pain with right greater than left lower extremity radicular pain in an S1 distribution.  History of this happening in the past as well this current episode for 2 months.MRI from 2021 reveals relatively mild degenerative disc disease through the lumbar spine although right foraminal disc herniation at L5-S1 at that time contacting the L5 nerve.  Most recent pain is consistent with S1 radicular symptoms question new herniation but also has cam impingement left greater than right which may result in pelvic joint dysfunction with pseudo radicular pain contributing as well.  2.  Myofascial pain lumbar spine and gluteal region.      Discussion:    1.  I discussed the diagnosis and treatment options.  I reviewed the option of physical therapy, imaging, meds.  At this point her pain is improving slowly we discussed option of gabapentin for neuropathic pain and she declines also discussed option of NSAIDs.    2.  She is scheduled for physical therapy today and would also add PJD physical therapy to assist feeling pelvic joint dysfunction related to her cam impingement.    3.  Trial Advil or Aleve over-the-counter as needed.    4.  Plain films lumbar spine flexion-extension evaluate for progression of degenerative disc disease in the lumbar spine.    5.  If she develops  any weakness she should return to clinic and will monitor for worsening symptoms.    6.  Follow-up with me in approximately 6 weeks if no improvement can consider further imaging.      It was our pleasure caring for your patient today, if there any questions or concerns please do not hesitate to contact us.      Subjective:   Patient ID: Shala Morales is a 49 year old female.    History of Present Illness: Patient presents at the request of Dr. Morillo for an evaluation of lumbar spine pain with bilateral lower extremity pain and paresthesias.  Has had this occur few times over the past several years at this time 2 months ago no injury.  Constant pain started down the lateral thighs and lateral calves to the lateral foot with pain and paresthesias has progressed centrally up to the low back right greater than left over the PSIS and right gluteal region.  Fairly constant worse with too much sitting or walking better with rest and mild activities.  Pain is a 7/10 at worst 2/10 today and at best.  Has not yet started PT but starts today.  Has seen a chiropractor without benefit.  No new imaging.  Has had a lumbar epidural in 2023 along with an MRI in 2021.  History of pelvic pain on Cymbalta.  Has been on gabapentin in the past as well.  Also has had some hip issues particularly on the left and has had a left steroid injection.      Imaging: MRI lumbar spine images and report personally reviewed from 2021.  Model was used during the discussion.  Relatively mild degenerative disc disease of the lumbar spine no high-grade central stenosis.  Minimal facet arthropathy through the lumbar spine.  L5-S1 there is a right foraminal disc herniation contacting the exiting right L5 nerve.  No left-sided disc herniation.       Review of Systems: Patient complains of weight gain, ringing in the ears, abdominal pain, constipation, reflux, joint pain muscle pain muscle fatigue.  Denies fever, weight loss, waking, headache, change  in vision, chest pain, shortness of breath, bowel or bladder incontinence, skin issues, balance issues, sleep issues.  Remainder of 12 point review systems negative unless listed above.      Current Outpatient Medications   Medication Sig Dispense Refill    azelaic acid (FINACIA) 15 % external gel Apply 1 g topically 2 times daily      cetirizine (ZYRTEC) 10 MG tablet Take 1 tablet by mouth daily      crisaborole (EUCRISA) 2 % ointment Apply topically 2 times daily      Cyanocobalamin (VITAMIN B 12 PO)       diazepam 10 mg SUPP Place vaginally or rectally daily as needed. 10 suppository 3    DULoxetine (CYMBALTA) 30 MG capsule TAKE 1 CAPSULE (30 MG) BY MOUTH DAILY - DOSE DECREASE 2/13/24 90 capsule 0    estradiol (ESTRACE) 0.1 MG/GM vaginal cream APPLY SMALL AMOUNT TO AFFECTED AREA DAILY FOR 2 WEEKS      ketoconazole (NIZORAL) 2 % external shampoo APPLY TO FACE AND SCALP THEN WASH OFF THREE TIMES A WEEK      lidocaine (XYLOCAINE) 2 % external gel APPLY A SMALL AMOUNT TO THE AFFECTED AREA TOPICALLY TWICE DAILY 30 mL 11    Magnesium Citrate 200 MG TABS       metroNIDAZOLE (METROCREAM) 0.75 % external cream APPLY TO THE FACE TWICE DAILY      polyethylene glycol (MIRALAX) 17 g packet       progesterone (PROMETRIUM) 100 MG capsule Take 1 capsule every day by oral route for 30 days.      TYRVAYA 0.03 MG/ACT nasal spray       vitamin D3 (CHOLECALCIFEROL) 2000 units (50 mcg) tablet Take 1 tablet by mouth daily      XIIDRA 5 % opthalmic solution INSTILL 1 DROP INTO EACH EYE TWICE DAILY AS DIRECTED      zinc gluconate 50 MG tablet       CHANCE 0.0375 MG/24HR BIW patch APPLY 1 PATCH TO SKIN TWICE WEEKLY      DULoxetine (CYMBALTA) 60 MG capsule Take 1 capsule (60 mg) by mouth At Bedtime 90 capsule 2     No current facility-administered medications for this visit.     Facility-Administered Medications Ordered in Other Visits   Medication Dose Route Frequency Provider Last Rate Last Admin    sodium chloride (PF) 0.9% PF flush 60  mL  60 mL Intravenous Once Lewis Daugherty MD           Past Medical History:   Diagnosis Date    Depressive disorder     Gastroesophageal reflux disease     History of alcoholism (H) 2018    History of depression 2018    HPV in female 2018    HTN (hypertension)     Motion sickness     Thyroid nodule 2024       Family History   Problem Relation Age of Onset    Thyroid Disease Mother     Hypertension Mother     Atrial fibrillation Mother     Hyperlipidemia Mother     Depression Mother     Anesthesia Reaction Mother     Osteoporosis Mother     Diabetes Father     Hypertension Father     Depression Father     Mental Illness Father     Obesity Father     Thyroid Disease Maternal Grandmother     Hypertension Maternal Grandmother     Osteoporosis Maternal Grandmother     Hypertension Maternal Grandfather     Hypertension Paternal Grandmother     Obesity Paternal Grandmother     Colon Cancer Paternal Grandfather     Diabetes Paternal Grandfather     Hypertension Paternal Grandfather     Prostate Cancer Paternal Grandfather     Anxiety Disorder Cousin          Social History     Socioeconomic History    Marital status:      Spouse name: None    Number of children: None    Years of education: None    Highest education level: None   Tobacco Use    Smoking status: Former     Current packs/day: 0.00     Average packs/day: 1 pack/day for 25.7 years (25.7 ttl pk-yrs)     Types: Cigarettes     Start date: 1994     Quit date: 10/1/2019     Years since quittin.8     Passive exposure: Past    Smokeless tobacco: Never    Tobacco comments:     Stopped smoking cigarettes  but then started using E cigarette which I then quit using    Vaping Use    Vaping status: Never Used   Substance and Sexual Activity    Alcohol use: Not Currently     Comment: Recovering alcoholic, 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 No     Social Determinants of Health     Financial Resource Strain: Low Risk  (2/12/2024)    Financial Resource Strain     Within the past 12 months, have you or your family members you live with been unable to get utilities (heat, electricity) when it was really needed?: No   Food Insecurity: Low Risk  (2/12/2024)    Food Insecurity     Within the past 12 months, did you worry that your food would run out before you got money to buy more?: No     Within the past 12 months, did the food you bought just not last and you didn t have money to get more?: No   Transportation Needs: Low Risk  (2/12/2024)    Transportation Needs     Within the past 12 months, has lack of transportation kept you from medical appointments, getting your medicines, non-medical meetings or appointments, work, or from getting things that you need?: No   Physical Activity: Insufficiently Active (2/12/2024)    Exercise Vital Sign     Days of Exercise per Week: 3 days     Minutes of Exercise per Session: 40 min   Stress: No Stress Concern Present (2/12/2024)    [employer] - Occupational Stress Questionnaire     Feeling of Stress : Only a little   Social Connections: Unknown (2/12/2024)    Social Connection and Isolation Panel [NHANES]     Frequency of Social Gatherings with Friends and Family: Once a week   Interpersonal Safety: Low Risk  (2/13/2024)    Interpersonal Safety     Do you feel physically and emotionally safe where you currently live?: Yes     Within the past 12 months, have you been hit, slapped, kicked or otherwise physically hurt by someone?: No     Within the past 12 months, have you been humiliated or emotionally abused in other ways by your partner or ex-partner?: No   Housing Stability: Low Risk  (2/12/2024)    Housing Stability     Do you have housing? : Yes     Are you worried about losing your housing?: No      Social history: .  Works as an LPN in dermatology.  No tobacco.  No alcohol   Currently.  The following portions of the patient's history were reviewed and updated as appropriate: allergies, current medications, past family history, past medical history, past social history, past surgical history and problem list.    Oswestry (CORNEL) Questionnaire        7/24/2024    12:03 PM   OSWESTRY DISABILITY INDEX   Count 10   Sum 11   Oswestry Score (%) 22 %       Neck Disability Index:      7/24/2024    12:04 PM   Neck Disability Index (  Ashvin THURMAN. and Jessica BARCENAS. 1991. All rights reserved.; used with permission)   SECTION 1 - PAIN INTENSITY 0   SECTION 2 - PERSONAL CARE 0   SECTION 3 - LIFTING 0   SECTION 4 - READING 0   SECTION 5 - HEADACHES 1   SECTION 6 - CONCENTRATION 0   SECTION 7 - WORK 0   SECTION 8 - DRIVING 0   SECTION 9 - SLEEPING 0   SECTION 10 - RECREATION 0   Count 10   Sum 1   Raw Score: /50 1   Neck Disability Index Score: (%) 2 %          PHQ-2 Score:         2/12/2024     8:51 AM 10/24/2023    11:09 AM   PHQ-2 ( 1999 [employer])   Q1: Little interest or pleasure in doing things 1 0   Q2: Feeling down, depressed or hopeless 0 0   PHQ-2 Score 1 0                  Objective:   Physical Exam:    BP (!) 142/81   Pulse 73   Wt 204 lb (92.5 kg)   BMI 30.70 kg/m    Body mass index is 30.7 kg/m .      General:  Well-appearing female in no acute distress.  Pleasant, cooperative, and interactive throughout the examination and interview.  CV: No lower extremity edema on inspection or paltation.  Lymphatics: No cervical lymphadenopathy palpated. Eyes: sclera clear. Skin: No rashes or lesions seen over the head/neck, hairline, arms, legs, trunk.  Respirations unlabored.  MSK: Gait is nonantalgic.  Able to heel-toe walk without difficulty.  Negative Romberg.  Spine: normal AP curves of the C, T, and L spine.  Full range of motion in the Lumbar spine in all planes.  Palpation: Tenderness to palpation over right PSIS  and gluteal tissues.  Extremities: Full range of motion of the elbows, and wrists with no effusions or tenderness to palpation.  Reduced range of motion of the left greater than right hip and internal rotation and external rotation.  Full range of motion of the   knees, and ankles with no effusions or tenderness to palpation.  Negative scour maneuver and Max's test bilaterally.  Positive fair test on the left.  No hypermobility of the upper or lower extremities.  Neurologic exam: Mental status: Patient is alert and oriented with normal affect.  Attention, knowledge, memory, and language are intact.  Normal coordination throughout the examination.  Reflexes are 2+ and symmetric biceps, triceps, brachioradialis, patellar, and Achilles with down-going toes and Negative Dianne's.  Sensation is intact to light touch throughout the upper and lower extremities bilaterally.  Manual muscle testing reveals 5 out of 5 in the hip flexors, knee flexors/extensors, ankle plantar flexors, ankle  dorsiflexors, and EHL.  Upper extremities: Grossly normal strength . Normal muscle bulk and tone in the arms and legs.    Negative seated and supine straight leg raise bilaterally.

## 2024-07-29 NOTE — PATIENT INSTRUCTIONS
A physical therapy order was provided for you today.  You will be contacted by physical therapy.  If nobody contacts you within 3 to 5 days, please contact the clinic at [phone].  It will be very important for you to do your physical therapy exercises on a regular basis to decrease your pain and prevent future pain flares.   An xray was ordered for you today.  Please call Radiology at [phone].

## 2024-07-29 NOTE — PROGRESS NOTES
PHYSICAL THERAPY EVALUATION  Type of Visit: Evaluation    See electronic medical record for Abuse and Falls Screening details.            Subjective     REFERRAL DX:   Lumbar radicular pain   Lumbar disc herniation   Foraminal stenosis of lumbar region   Myofascial pain       SUBJECTIVE HISTORY: Patient is a 49 year old female presenting with hx of low back pain. Not new symptoms but this last episode, symptoms lasting longer. Has had numbness in both legs but primarily the R. Almost feels like a gnawing pain. Had an injection about a year ago, helped. Depending on the day, there is a light sensation that extends into the feet/knee on R. Worse in the after. Sleeps with wedge due to acid reflex, generally back feels okay with sleeping. Had therapy in the past but has not been working on those exercises as much, was not sure what was still safe to continue at this time.    PAIN:  At rest: 0/10  With activity: 6/10  Frequency: with activity/position  Quality: gnawing pain  Progression:  improving  Exacerbated by: extended walking, >15 minutes, sitting > 10 minutes,  standing >15 minutes  Eased by: injection, therapy in the past, stretches, bridges, cobra, ice, bath, cobra        Objective         Presenting condition or subjective complaint: Sciatic nerve pain that is affecting foot and calf. Initially effecting both feet  Date of onset: 07/29/24    Relevant medical history: Bladder or bowel problems; Concussions; Depression; High blood pressure; Overweight; Pain at night or rest; Substance use disorder   Dates & types of surgery: None    Prior diagnostic imaging/testing results: MRI; EMG; Other Injection     Hx of CAM impingement, disc herniation  Prior therapy history for the same diagnosis, illness or injury: Yes End of 2023    Living Environment  Social support: With a significant other or spouse   Type of home: House   Stairs to enter the home: Yes 2 Is there a railing: Yes     Ramp: No   Stairs inside the home:  "Yes 12 Is there a railing: Yes     Help at home: None  Equipment owned:       Employment: Yes Nurse  Hobbies/Interests: Gardening, building camper van with     Patient goals for therapy: Exercise, be active gardening, sit and stand without worsening condition    LUMBAR SPINE OBJECTIVE  ROM:   (Degrees) Left AROM Left PROM  Right AROM Right PROM   Hip Flexion       Hip Extension neutral  neurtral    Hip Abduction       Hip Internal Rotation       Hip External Rotation       Lumbar Side glide Knee joint Knee joint   Lumbar Flexion To toes, tightness in HS   Lumbar Extension 25% limited     STRENGTH:   Pain: - none + mild ++ moderate +++ severe  Strength Scale: 0-5/5 Left Right   Hip Flexion 5 5   Hip Extension 5 4+   Hip Abduction 5 4+   Hip Internal Rotation 5 5   Hip External Rotation 5 4   Knee Flexion 5 5   Knee Extension 5 5     LUMBAR/HIP Special Tests:    Left Right   FADIR/Labrum/JULIETTE Negative  Positive   Chikis's Positive Positive   Piriformis Negative  Negative    SLR Negative  Positive   Rubén Positive Positive      PELVIS/SI SPECIAL TESTS:    Left Right Additional Notes   ASLR      Gaenslen's Test      Pelvic Compression - -    Pelvic Gapping - +    Sacral Thrust - -    Thigh Thrust - -      PALPATION:   + Tenderness At Location Left Right   Quadratus Lumborum - +   Erector Spinae - +   Piriformis  - +   PSIS - +   ASIS - +   Iliac Crest - -   Glut Medius - +   Greater Trochanter - -   Ischial Tuberosity - -   Hamstrings - -   Hip Flexors + +   Vertebral  - +       BALANCE/PROPRIOCEPTION: Single Leg Stance Eyes Open (seconds): 16\" B; mod Trendelenburg on R SLS    MYOTOMES: WNL  DERMATOMES:    Left Right   T12  Normal (light touch) Normal (light touch)   L1 Normal (light touch) Normal (light touch)   L2 Normal (light touch) Normal (light touch)   L3 Normal (light touch) Normal (light touch)   L4 Normal (light touch) Normal (light touch)   L5 Hypo (light touch) Normal (light touch)   S1 Hypo (light " touch) Normal (light touch)     NEURAL TENSION:    Left Right   SLR Negative  Positive     Assessment & Plan   CLINICAL IMPRESSIONS  Medical Diagnosis: lumbar radicular pain, lumbar disc hernation, foraminal stenosis of lumbar region, myofascial pain    Treatment Diagnosis:     Impression/Assessment: Patient is a 49 year old female with low back complaints.  The following significant findings have been identified: Pain, Decreased ROM/flexibility, Decreased joint mobility, Decreased strength, Impaired muscle performance, and Decreased activity tolerance. These impairments interfere with their ability to perform recreational activities, household chores, household mobility, and community mobility as compared to previous level of function.     Clinical Decision Making (Complexity):  Clinical Presentation: Stable/Uncomplicated  Clinical Presentation Rationale: based on medical and personal factors listed in PT evaluation  Clinical Decision Making (Complexity): Low complexity    PLAN OF CARE  Treatment Interventions:  Modalities: Cryotherapy, Dry Needling, E-stim, Hot Pack  Interventions: Gait Training, Manual Therapy, Neuromuscular Re-education, Therapeutic Activity, Therapeutic Exercise, Self-Care/Home Management    Long Term Goals     PT Goal 1  Goal Description: Patient will be able to ambulate >15 minutes without increas in LBP  Rationale: to maximize safety and independence with performance of ADLs and functional tasks  Target Date: 10/21/24  PT Goal 2  Goal Description: Patient will be able to complete sit to stand transfers with <5/10 low back pain  Rationale: to maximize safety and independence with performance of ADLs and functional tasks;to maximize safety and independence within the home;to maximize safety and independence within the community  Target Date: 10/21/24      Frequency of Treatment: 1x/week, weekly-biweekly  Duration of Treatment: 8-12 weeks    Education Assessment:   Learner/Method:  Patient  Education Comments: PTRX    Risks and benefits of evaluation/treatment have been explained.   Patient/Family/caregiver agrees with Plan of Care.     Evaluation Time:     PT Jigar, Low Complexity Minutes (59849): 20       Signing Clinician: Michaela Vigil PT

## 2024-07-29 NOTE — LETTER
7/29/2024      Shala Morales  [address] [address] [id number]      Dear Colleague,    Thank you for referring your patient, Shala Morales, to the [facility]. Please see a copy of my visit note below.    Assessment/Plan:      Shala was seen today for back pain.    Diagnoses and all orders for this visit:    Lumbar radicular pain  -     Physical Therapy  Referral; Future  -     XR Lumbar Flex/Ext 2/3 Views; Future    Lumbar disc herniation  -     Physical Therapy  Referral; Future    Foraminal stenosis of lumbar region  -     Physical Therapy  Referral; Future    Myofascial pain  -     Physical Therapy  Referral; Future    Other orders  -     Spine  Referral         Assessment: Pleasant 49 year old female with a history of hypertension, depression, hypothyroidism, history of alcohol use with:    1.  Lumbar spine pain with right greater than left lower extremity radicular pain in an S1 distribution.  History of this happening in the past as well this current episode for 2 months.MRI from 2021 reveals relatively mild degenerative disc disease through the lumbar spine although right foraminal disc herniation at L5-S1 at that time contacting the L5 nerve.  Most recent pain is consistent with S1 radicular symptoms question new herniation but also has cam impingement left greater than right which may result in pelvic joint dysfunction with pseudo radicular pain contributing as well.  2.  Myofascial pain lumbar spine and gluteal region.      Discussion:    1.  I discussed the diagnosis and treatment options.  I reviewed the option of physical therapy, imaging, meds.  At this point her pain is improving slowly we discussed option of gabapentin for neuropathic pain and she declines also discussed option of NSAIDs.    2.  She is scheduled for physical therapy today and would also add PJD physical therapy to assist feeling pelvic  joint dysfunction related to her cam impingement.    3.  Trial Advil or Aleve over-the-counter as needed.    4.  Plain films lumbar spine flexion-extension evaluate for progression of degenerative disc disease in the lumbar spine.    5.  If she develops any weakness she should return to clinic and will monitor for worsening symptoms.    6.  Follow-up with me in approximately 6 weeks if no improvement can consider further imaging.      It was our pleasure caring for your patient today, if there any questions or concerns please do not hesitate to contact us.      Subjective:   Patient ID: Shala Morales is a 49 year old female.    History of Present Illness: Patient presents at the request of Dr. Morillo for an evaluation of lumbar spine pain with bilateral lower extremity pain and paresthesias.  Has had this occur few times over the past several years at this time 2 months ago no injury.  Constant pain started down the lateral thighs and lateral calves to the lateral foot with pain and paresthesias has progressed centrally up to the low back right greater than left over the PSIS and right gluteal region.  Fairly constant worse with too much sitting or walking better with rest and mild activities.  Pain is a 7/10 at worst 2/10 today and at best.  Has not yet started PT but starts today.  Has seen a chiropractor without benefit.  No new imaging.  Has had a lumbar epidural in 2023 along with an MRI in 2021.  History of pelvic pain on Cymbalta.  Has been on gabapentin in the past as well.  Also has had some hip issues particularly on the left and has had a left steroid injection.      Imaging: MRI lumbar spine images and report personally reviewed from 2021.  Model was used during the discussion.  Relatively mild degenerative disc disease of the lumbar spine no high-grade central stenosis.  Minimal facet arthropathy through the lumbar spine.  L5-S1 there is a right foraminal disc herniation contacting the exiting  right L5 nerve.  No left-sided disc herniation.       Review of Systems: Patient complains of weight gain, ringing in the ears, abdominal pain, constipation, reflux, joint pain muscle pain muscle fatigue.  Denies fever, weight loss, waking, headache, change in vision, chest pain, shortness of breath, bowel or bladder incontinence, skin issues, balance issues, sleep issues.  Remainder of 12 point review systems negative unless listed above.      Current Outpatient Medications   Medication Sig Dispense Refill     azelaic acid (FINACIA) 15 % external gel Apply 1 g topically 2 times daily       cetirizine (ZYRTEC) 10 MG tablet Take 1 tablet by mouth daily       crisaborole (EUCRISA) 2 % ointment Apply topically 2 times daily       Cyanocobalamin (VITAMIN B 12 PO)        diazepam 10 mg SUPP Place vaginally or rectally daily as needed. 10 suppository 3     DULoxetine (CYMBALTA) 30 MG capsule TAKE 1 CAPSULE (30 MG) BY MOUTH DAILY - DOSE DECREASE 2/13/24 90 capsule 0     estradiol (ESTRACE) 0.1 MG/GM vaginal cream APPLY SMALL AMOUNT TO AFFECTED AREA DAILY FOR 2 WEEKS       ketoconazole (NIZORAL) 2 % external shampoo APPLY TO FACE AND SCALP THEN WASH OFF THREE TIMES A WEEK       lidocaine (XYLOCAINE) 2 % external gel APPLY A SMALL AMOUNT TO THE AFFECTED AREA TOPICALLY TWICE DAILY 30 mL 11     Magnesium Citrate 200 MG TABS        metroNIDAZOLE (METROCREAM) 0.75 % external cream APPLY TO THE FACE TWICE DAILY       polyethylene glycol (MIRALAX) 17 g packet        progesterone (PROMETRIUM) 100 MG capsule Take 1 capsule every day by oral route for 30 days.       TYRVAYA 0.03 MG/ACT nasal spray        vitamin D3 (CHOLECALCIFEROL) 2000 units (50 mcg) tablet Take 1 tablet by mouth daily       XIIDRA 5 % opthalmic solution INSTILL 1 DROP INTO EACH EYE TWICE DAILY AS DIRECTED       zinc gluconate 50 MG tablet        CHANCE 0.0375 MG/24HR BIW patch APPLY 1 PATCH TO SKIN TWICE WEEKLY       DULoxetine (CYMBALTA) 60 MG capsule Take 1  capsule (60 mg) by mouth At Bedtime 90 capsule 2     No current facility-administered medications for this visit.     Facility-Administered Medications Ordered in Other Visits   Medication Dose Route Frequency Provider Last Rate Last Admin     sodium chloride (PF) 0.9% PF flush 60 mL  60 mL Intravenous Once Lewis Daugherty MD           Past Medical History:   Diagnosis Date     Depressive disorder      Gastroesophageal reflux disease      History of alcoholism (H) 2018     History of depression 2018     HPV in female 2018     HTN (hypertension)      Motion sickness      Thyroid nodule 2024       Family History   Problem Relation Age of Onset     Thyroid Disease Mother      Hypertension Mother      Atrial fibrillation Mother      Hyperlipidemia Mother      Depression Mother      Anesthesia Reaction Mother      Osteoporosis Mother      Diabetes Father      Hypertension Father      Depression Father      Mental Illness Father      Obesity Father      Thyroid Disease Maternal Grandmother      Hypertension Maternal Grandmother      Osteoporosis Maternal Grandmother      Hypertension Maternal Grandfather      Hypertension Paternal Grandmother      Obesity Paternal Grandmother      Colon Cancer Paternal Grandfather      Diabetes Paternal Grandfather      Hypertension Paternal Grandfather      Prostate Cancer Paternal Grandfather      Anxiety Disorder Cousin          Social History     Socioeconomic History     Marital status:      Spouse name: None     Number of children: None     Years of education: None     Highest education level: None   Tobacco Use     Smoking status: Former     Current packs/day: 0.00     Average packs/day: 1 pack/day for 25.7 years (25.7 ttl pk-yrs)     Types: Cigarettes     Start date: 1994     Quit date: 10/1/2019     Years since quittin.8     Passive exposure: Past     Smokeless tobacco: Never     Tobacco comments:     Stopped smoking cigarettes  but  then started using E cigarette which I then quit using 2/19   Vaping Use     Vaping status: Never Used   Substance and Sexual Activity     Alcohol use: Not Currently     Comment: Recovering alcoholic, 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 No     Social Determinants of Health     Financial Resource Strain: Low Risk  (2/12/2024)    Financial Resource Strain      Within the past 12 months, have you or your family members you live with been unable to get utilities (heat, electricity) when it was really needed?: No   Food Insecurity: Low Risk  (2/12/2024)    Food Insecurity      Within the past 12 months, did you worry that your food would run out before you got money to buy more?: No      Within the past 12 months, did the food you bought just not last and you didn t have money to get more?: No   Transportation Needs: Low Risk  (2/12/2024)    Transportation Needs      Within the past 12 months, has lack of transportation kept you from medical appointments, getting your medicines, non-medical meetings or appointments, work, or from getting things that you need?: No   Physical Activity: Insufficiently Active (2/12/2024)    Exercise Vital Sign      Days of Exercise per Week: 3 days      Minutes of Exercise per Session: 40 min   Stress: No Stress Concern Present (2/12/2024)    [place] [employer] - Occupational Stress Questionnaire      Feeling of Stress : Only a little   Social Connections: Unknown (2/12/2024)    Social Connection and Isolation Panel [NHANES]      Frequency of Social Gatherings with Friends and Family: Once a week   Interpersonal Safety: Low Risk  (2/13/2024)    Interpersonal Safety      Do you feel physically and emotionally safe where you currently live?: Yes      Within the past 12 months, have you been hit,  slapped, kicked or otherwise physically hurt by someone?: No      Within the past 12 months, have you been humiliated or emotionally abused in other ways by your partner or ex-partner?: No   Housing Stability: Low Risk  (2/12/2024)    Housing Stability      Do you have housing? : Yes      Are you worried about losing your housing?: No     Social history: .  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  No tobacco.  No alcohol   Currently.  The following portions of the patient's history were reviewed and updated as appropriate: allergies, current medications, past family history, past medical history, past social history, past surgical history and problem list.    Oswestry (CORNEL) Questionnaire        7/24/2024    12:03 PM   OSWESTRY DISABILITY INDEX   Count 10   Sum 11   Oswestry Score (%) 22 %       Neck Disability Index:      7/24/2024    12:04 PM   Neck Disability Index (  Ashvin THURMAN. and Jessica BARCENAS. 1991. All rights reserved.; used with permission)   SECTION 1 - PAIN INTENSITY 0   SECTION 2 - PERSONAL CARE 0   SECTION 3 - LIFTING 0   SECTION 4 - READING 0   SECTION 5 - HEADACHES 1   SECTION 6 - CONCENTRATION 0   SECTION 7 - WORK 0   SECTION 8 - DRIVING 0   SECTION 9 - SLEEPING 0   SECTION 10 - RECREATION 0   Count 10   Sum 1   Raw Score: /50 1   Neck Disability Index Score: (%) 2 %          PHQ-2 Score:         2/12/2024     8:51 AM 10/24/2023    11:09 AM   PHQ-2 ( 1999 [employer])   Q1: Little interest or pleasure in doing things 1 0   Q2: Feeling down, depressed or hopeless 0 0   PHQ-2 Score 1 0                  Objective:   Physical Exam:    BP (!) 142/81   Pulse 73   Wt 204 lb (92.5 kg)   BMI 30.70 kg/m    Body mass index is 30.7 kg/m .      General:  Well-appearing female in no acute distress.  Pleasant, cooperative, and interactive throughout the examination and interview.  CV: No lower extremity edema on inspection or paltation.  Lymphatics: No cervical lymphadenopathy palpated. Eyes: sclera clear. Skin: No rashes or  lesions seen over the head/neck, hairline, arms, legs, trunk.  Respirations unlabored.  MSK: Gait is nonantalgic.  Able to heel-toe walk without difficulty.  Negative Romberg.  Spine: normal AP curves of the C, T, and L spine.  Full range of motion in the Lumbar spine in all planes.  Palpation: Tenderness to palpation over right PSIS and gluteal tissues.  Extremities: Full range of motion of the elbows, and wrists with no effusions or tenderness to palpation.  Reduced range of motion of the left greater than right hip and internal rotation and external rotation.  Full range of motion of the   knees, and ankles with no effusions or tenderness to palpation.  Negative scour maneuver and Max's test bilaterally.  Positive fair test on the left.  No hypermobility of the upper or lower extremities.  Neurologic exam: Mental status: Patient is alert and oriented with normal affect.  Attention, knowledge, memory, and language are intact.  Normal coordination throughout the examination.  Reflexes are 2+ and symmetric biceps, triceps, brachioradialis, patellar, and Achilles with down-going toes and Negative Dianne's.  Sensation is intact to light touch throughout the upper and lower extremities bilaterally.  Manual muscle testing reveals 5 out of 5 in the hip flexors, knee flexors/extensors, ankle plantar flexors, ankle  dorsiflexors, and EHL.  Upper extremities: Grossly normal strength . Normal muscle bulk and tone in the arms and legs.    Negative seated and supine straight leg raise bilaterally.            Again, thank you for allowing me to participate in the care of your patient.        Sincerely,        Santhosh Peck, DO

## 2024-07-31 ENCOUNTER — ANCILLARY PROCEDURE (OUTPATIENT)
Dept: GENERAL RADIOLOGY | Facility: CLINIC | Age: 49
End: 2024-07-31
Attending: PHYSICAL MEDICINE & REHABILITATION
Payer: COMMERCIAL

## 2024-07-31 DIAGNOSIS — M54.16 LUMBAR RADICULAR PAIN: ICD-10-CM

## 2024-07-31 PROCEDURE — 72120 X-RAY BEND ONLY L-S SPINE: CPT | Mod: TC | Performed by: RADIOLOGY

## 2024-08-05 ENCOUNTER — THERAPY VISIT (OUTPATIENT)
Dept: PHYSICAL THERAPY | Facility: CLINIC | Age: 49
End: 2024-08-05
Payer: COMMERCIAL

## 2024-08-05 DIAGNOSIS — M54.16 LUMBAR RADICULAR PAIN: Primary | ICD-10-CM

## 2024-08-05 DIAGNOSIS — M48.061 FORAMINAL STENOSIS OF LUMBAR REGION: ICD-10-CM

## 2024-08-05 DIAGNOSIS — M51.26 LUMBAR DISC HERNIATION: ICD-10-CM

## 2024-08-05 DIAGNOSIS — M79.18 MYOFASCIAL PAIN: ICD-10-CM

## 2024-08-05 PROCEDURE — 97140 MANUAL THERAPY 1/> REGIONS: CPT | Mod: GP | Performed by: PHYSICAL THERAPIST

## 2024-08-05 PROCEDURE — 97112 NEUROMUSCULAR REEDUCATION: CPT | Mod: GP | Performed by: PHYSICAL THERAPIST

## 2024-08-19 ENCOUNTER — THERAPY VISIT (OUTPATIENT)
Dept: PHYSICAL THERAPY | Facility: CLINIC | Age: 49
End: 2024-08-19
Payer: COMMERCIAL

## 2024-08-19 DIAGNOSIS — M54.16 LUMBAR RADICULAR PAIN: Primary | ICD-10-CM

## 2024-08-19 DIAGNOSIS — M79.18 MYOFASCIAL PAIN: ICD-10-CM

## 2024-08-19 DIAGNOSIS — M48.061 FORAMINAL STENOSIS OF LUMBAR REGION: ICD-10-CM

## 2024-08-19 DIAGNOSIS — M51.26 LUMBAR DISC HERNIATION: ICD-10-CM

## 2024-08-19 PROCEDURE — 97140 MANUAL THERAPY 1/> REGIONS: CPT | Mod: GP | Performed by: PHYSICAL THERAPIST

## 2024-08-19 PROCEDURE — 97112 NEUROMUSCULAR REEDUCATION: CPT | Mod: GP | Performed by: PHYSICAL THERAPIST

## 2024-08-26 ENCOUNTER — THERAPY VISIT (OUTPATIENT)
Dept: PHYSICAL THERAPY | Facility: CLINIC | Age: 49
End: 2024-08-26
Payer: COMMERCIAL

## 2024-08-26 DIAGNOSIS — M79.18 MYOFASCIAL PAIN: ICD-10-CM

## 2024-08-26 DIAGNOSIS — M51.26 LUMBAR DISC HERNIATION: ICD-10-CM

## 2024-08-26 DIAGNOSIS — M54.16 LUMBAR RADICULAR PAIN: Primary | ICD-10-CM

## 2024-08-26 DIAGNOSIS — M48.061 FORAMINAL STENOSIS OF LUMBAR REGION: ICD-10-CM

## 2024-08-26 PROCEDURE — 97112 NEUROMUSCULAR REEDUCATION: CPT | Mod: GP | Performed by: PHYSICAL THERAPIST

## 2024-08-26 PROCEDURE — 97530 THERAPEUTIC ACTIVITIES: CPT | Mod: GP | Performed by: PHYSICAL THERAPIST

## 2024-08-26 PROCEDURE — 97140 MANUAL THERAPY 1/> REGIONS: CPT | Mod: GP | Performed by: PHYSICAL THERAPIST

## 2024-09-10 ENCOUNTER — ANCILLARY PROCEDURE (OUTPATIENT)
Dept: ULTRASOUND IMAGING | Facility: CLINIC | Age: 49
End: 2024-09-10
Attending: PHYSICIAN ASSISTANT
Payer: COMMERCIAL

## 2024-09-10 DIAGNOSIS — E04.1 THYROID NODULE: ICD-10-CM

## 2024-09-10 PROCEDURE — 76536 US EXAM OF HEAD AND NECK: CPT | Mod: TC | Performed by: RADIOLOGY

## 2024-09-22 ASSESSMENT — PATIENT HEALTH QUESTIONNAIRE - PHQ9
10. IF YOU CHECKED OFF ANY PROBLEMS, HOW DIFFICULT HAVE THESE PROBLEMS MADE IT FOR YOU TO DO YOUR WORK, TAKE CARE OF THINGS AT HOME, OR GET ALONG WITH OTHER PEOPLE: NOT DIFFICULT AT ALL
SUM OF ALL RESPONSES TO PHQ QUESTIONS 1-9: 2
SUM OF ALL RESPONSES TO PHQ QUESTIONS 1-9: 2

## 2024-09-23 ENCOUNTER — THERAPY VISIT (OUTPATIENT)
Dept: PHYSICAL THERAPY | Facility: CLINIC | Age: 49
End: 2024-09-23
Payer: COMMERCIAL

## 2024-09-23 ENCOUNTER — VIRTUAL VISIT (OUTPATIENT)
Dept: FAMILY MEDICINE | Facility: CLINIC | Age: 49
End: 2024-09-23
Payer: COMMERCIAL

## 2024-09-23 DIAGNOSIS — M54.16 LUMBAR RADICULAR PAIN: Primary | ICD-10-CM

## 2024-09-23 DIAGNOSIS — M25.50 POLYARTHRALGIA: Primary | ICD-10-CM

## 2024-09-23 DIAGNOSIS — M48.061 FORAMINAL STENOSIS OF LUMBAR REGION: ICD-10-CM

## 2024-09-23 DIAGNOSIS — M51.26 LUMBAR DISC HERNIATION: ICD-10-CM

## 2024-09-23 DIAGNOSIS — M79.18 MYOFASCIAL PAIN: ICD-10-CM

## 2024-09-23 DIAGNOSIS — M25.571 PAIN IN JOINT INVOLVING ANKLE AND FOOT, RIGHT: ICD-10-CM

## 2024-09-23 DIAGNOSIS — Z82.61 FAMILY HISTORY OF RHEUMATOID ARTHRITIS: ICD-10-CM

## 2024-09-23 PROCEDURE — 97530 THERAPEUTIC ACTIVITIES: CPT | Mod: GP | Performed by: PHYSICAL THERAPIST

## 2024-09-23 PROCEDURE — 97112 NEUROMUSCULAR REEDUCATION: CPT | Mod: GP | Performed by: PHYSICAL THERAPIST

## 2024-09-23 PROCEDURE — 99213 OFFICE O/P EST LOW 20 MIN: CPT | Mod: 95 | Performed by: PHYSICIAN ASSISTANT

## 2024-09-23 NOTE — PROGRESS NOTES
09/23/24 0500   Appointment Info   Signing clinician's name / credentials Michaela Vigil, DPT, OCS, MTC   Total/Authorized Visits 8+5   Visits Used 5   Medical Diagnosis lumbar radicular pain, lumbar disc hernation, foraminal stenosis of lumbar region, myofascial pain, R ankle pain   PT Tx Diagnosis lumbar radicular pain, lumbar disc hernation, foraminal stenosis of lumbar region, myofascial pain, R ankle pain   Progress Note/Certification   Onset of illness/injury or Date of Surgery 07/29/24   Therapy Frequency 1x weekly-biweekly   Predicted Duration 8 weeks   Progress Note Completed Date 09/23/24   GOALS   PT Goals 2;3   PT Goal 1   Goal Description Patient will be able to ambulate >15 minutes without increas in LBP   Rationale to maximize safety and independence with performance of ADLs and functional tasks   Goal Progress less foot pain with waling, can walk about mile, 3/10   Target Date 10/21/24   Date Met 08/19/24   PT Goal 2   Goal Description Patient will be able to complete sit to stand transfers with <5/10 low back pain   Rationale to maximize safety and independence with performance of ADLs and functional tasks;to maximize safety and independence within the home;to maximize safety and independence within the community   Target Date 11/18/24   PT Goal 3   Goal Description Patient will be able to ambulate >15 minutes without increase in R foot and ankle pain   Rationale to maximize safety and independence within the community   Target Date 11/18/24   Subjective Report   Subjective Report Feeling discouraged. Not much change in sx. Anytimes I am trying to be active I can push through it but then pay for it later.   Objective Measures   Objective Measures Objective Measure 1;Objective Measure 2   Objective Measure 1   Objective Measure Ankle MMT   Details painful R inversion   Objective Measure 2   Objective Measure Gait   Details increased pronation in R>L, with reduced push off  through 1st met in terminal  "stance   Treatment Interventions (PT)   Interventions Therapeutic Procedure/Exercise;Neuromuscular Re-education;Manual Therapy;Therapeutic Activity   Therapeutic Procedure/Exercise   Skilled Intervention direct supervision/education provided on tasks below, in order to improve independence with functional activities in home and community   Patient Response/Progress verbalized understanding   Therapeutic Activity   Therapeutic Activities: dynamic activities to improve functional performance minutes (15942) 15   Ther Act 1 patient education + reassessment of sx   Ther Act 1 - Details role of distal limb strength/stability in possible influence of pelvic/lumbar control, possible trial of orthotics for offloading of peroneals, exam findings   Skilled Intervention direct supervision/education provided on tasks above, in order to improve independence with functional activities in home and community   Neuromuscular Re-education   Neuromuscular re-ed of mvmt, balance, coord, kinesthetic sense, posture, proprioception minutes (42729) 25   Neuromuscular Re-education Neuro Re-ed 2;Neuro Re-ed 3;Neuro Re-ed 4;Neuro Re-ed 5;Neuro Re-ed 6;Neuro Re-ed 7;Neuro Re-ed 8   Neuro Re-ed 1 glute bridges   Neuro Re-ed 1 - Details 5\"x15   Neuro Re-ed 2 Prone on elbows   Neuro Re-ed 2 - Details verbal review   Neuro Re-ed 3 side stepping, red theraband   Neuro Re-ed 3 - Details 3x8   Neuro Re-ed 4 Deadbug LE only   Neuro Re-ed 4 - Details 3x30\"   Neuro Re-ed 5 1st MTP resisted push downs in stride   Neuro Re-ed 5 - Details 5\"x15   Neuro Re-ed 6 Doming with RTB in standing   Neuro Re-ed 6 - Details 5\"x15   Neuro Re-ed 7 Standing HR with towel   Neuro Re-ed 7 - Details x15   Skilled Intervention one on one supervision and cuing required to help restore balance, coordination, kinesthetic sense and proprioception   Patient Response/Progress well tolerated   Education   Learner/Method Patient   Education Comments PTRX   Plan   Plan for next " session trial ankle strengthening, progress glute strength   Comments   Comments patient with minimal changes in distal LE sx despite interventions. reassessd today and seems to demo some irritaiton of the peroneals, impacting gait and possible exacerbating lower back symptoms. HEP was adjusted and recommendations were provided to see if we can address this with POC. would recommend PT for 6 weeks to work on foot and ankle control along side contineud lumbo-pelvic stabilization/strengthening.   Total Session Time   Timed Code Treatment Minutes 40   Total Treatment Time (sum of timed and untimed services) 40         [facility]                                                                                   OUTPATIENT PHYSICAL THERAPY    PLAN OF TREATMENT FOR OUTPATIENT REHABILITATION   Patient's Last Name, First Name, Shala Starkey YOB: 1975   Provider's Name   [facility]   Medical Record No.  [id number]     Onset Date: 07/29/24  Start of Care Date:  7/29/2024     Medical Diagnosis:  lumbar radicular pain, lumbar disc hernation, foraminal stenosis of lumbar region, myofascial pain, R ankle pain      PT Treatment Diagnosis:  lumbar radicular pain, lumbar disc hernation, foraminal stenosis of lumbar region, myofascial pain, R ankle pain Plan of Treatment  Frequency/Duration: 1x weekly-biweekly/ 8 weeks    Certification date from   9/23/2024 to    11/18/2024       See note for plan of treatment details and functional goals     Michaela Vigil, PT                         I CERTIFY THE NEED FOR THESE SERVICES FURNISHED UNDER        THIS PLAN OF TREATMENT AND WHILE UNDER MY CARE     (Physician attestation of this document indicates review and certification of the therapy plan).              Referring Provider:  Santhosh Peck    Initial Assessment  See Epic Evaluation-

## 2024-09-23 NOTE — PROGRESS NOTES
"Shala is a 49 year old who is being evaluated via a billable video visit.    How would you like to obtain your AVS? [facility]  If the video visit is dropped, the invitation should be resent by: Text to cell phone: [phone]  Will anyone else be joining your video visit? No      Assessment & Plan       ICD-10-CM    1. Polyarthralgia  M25.50 Lyme Disease Total Antibodies with Reflex to Confirmation     Cyclic Citrullinated Peptide Antibody IgG     Rheumatoid factor     Anti Nuclear Anastasia IgG by IFA with Reflex     Erythrocyte sedimentation rate auto     CRP inflammation     Babesia antibody IgG IgM     Anaplasma phagocytoph Antibodies IgG IgM     TSH with free T4 reflex      2. Family history of rheumatoid arthritis  Z82.61           1,2) Will recheck some lab work. Follow up pending results.         BMI  Estimated body mass index is 30.7 kg/m  as calculated from the following:    Height as of 3/25/24: 1.736 m (5' 8.35\").    Weight as of 7/29/24: 92.5 kg (204 lb).     Return for follow up pending lab and/or imaging results.      Subjective   Shala is a 49 year old, presenting for the following health issues:  Musculoskeletal Problem        9/23/2024     8:54 AM   Additional Questions   Roomed by Patient echecked in via mychart   Accompanied by na         9/23/2024     8:54 AM   Patient Reported Additional Medications   Patient reports taking the following new medications na     History of Present Illness       Reason for visit:  Joint pain  Symptom onset:  More than a month  Symptoms include:  Fluctuating level of joint pain t/o body  Symptom intensity:  Moderate  Symptom progression:  Worsening  Had these symptoms before:  No  What makes it worse:  Periods of inactivity or after lots of activity. Sometimes no reason at all  What makes it better:  Warm bath , light stretching but sometimes worsens   She is taking medications regularly.     Chronic sciatic issues  Mom has OA and RA  3-4 other family members have " RA  Joints feel tight, but no visible swelling  Carpal tunnel bilat  Hips, feet, low back, shoulders, elbows, wrists  Joint stiffness lasts ~1 hour in the morning, worsens after sitting for a period  No muscle tenderness unless near the pudendal nerve  Possibly worsening pain when Cymbalta dose decreased        Review of Systems  Constitutional, neuro, ENT, endocrine, pulmonary, cardiac, gastrointestinal, genitourinary, musculoskeletal, integument and psychiatric systems are negative, except as otherwise noted.      Objective           Vitals:  No vitals were obtained today due to virtual visit.    Physical Exam   GENERAL: alert and no distress  EYES: Eyes grossly normal to inspection  RESP: No audible wheeze, cough, or visible cyanosis.    SKIN: Visible skin clear. No significant rash, abnormal pigmentation or lesions.  NEURO: Cranial nerves grossly intact.  Mentation and speech appropriate for age.  PSYCH: Appropriate affect, tone, and pace of words        Video-Visit Details    Type of service:  Video Visit   Originating Location (pt. Location): Home    Distant Location (provider location):  On-site  Platform used for Video Visit: Hyacinth  Signed Electronically by: Rachel Cervantes PA-C

## 2024-09-24 ENCOUNTER — LAB (OUTPATIENT)
Dept: LAB | Facility: CLINIC | Age: 49
End: 2024-09-24
Payer: COMMERCIAL

## 2024-09-24 DIAGNOSIS — E78.5 HYPERLIPIDEMIA LDL GOAL <130: Primary | ICD-10-CM

## 2024-09-24 DIAGNOSIS — M25.50 POLYARTHRALGIA: ICD-10-CM

## 2024-09-24 LAB
B BURGDOR IGG+IGM SER QL: 0.05
CHOLEST SERPL-MCNC: 158 MG/DL
CRP SERPL-MCNC: <3 MG/L
ERYTHROCYTE [SEDIMENTATION RATE] IN BLOOD BY WESTERGREN METHOD: 11 MM/HR (ref 0–20)
FASTING STATUS PATIENT QL REPORTED: YES
HDLC SERPL-MCNC: 59 MG/DL
LDLC SERPL CALC-MCNC: 89 MG/DL
NONHDLC SERPL-MCNC: 99 MG/DL
RHEUMATOID FACT SERPL-ACNC: <10 IU/ML
TRIGL SERPL-MCNC: 48 MG/DL
TSH SERPL DL<=0.005 MIU/L-ACNC: 0.89 UIU/ML (ref 0.3–4.2)

## 2024-09-24 PROCEDURE — 99000 SPECIMEN HANDLING OFFICE-LAB: CPT

## 2024-09-24 PROCEDURE — 36415 COLL VENOUS BLD VENIPUNCTURE: CPT

## 2024-09-24 PROCEDURE — 80061 LIPID PANEL: CPT

## 2024-09-24 PROCEDURE — 86618 LYME DISEASE ANTIBODY: CPT

## 2024-09-24 PROCEDURE — 85652 RBC SED RATE AUTOMATED: CPT

## 2024-09-24 PROCEDURE — 86431 RHEUMATOID FACTOR QUANT: CPT

## 2024-09-24 PROCEDURE — 84443 ASSAY THYROID STIM HORMONE: CPT

## 2024-09-24 PROCEDURE — 86753 PROTOZOA ANTIBODY NOS: CPT | Mod: 90

## 2024-09-24 PROCEDURE — 86140 C-REACTIVE PROTEIN: CPT

## 2024-09-24 PROCEDURE — 86038 ANTINUCLEAR ANTIBODIES: CPT

## 2024-09-24 PROCEDURE — 86200 CCP ANTIBODY: CPT

## 2024-09-24 PROCEDURE — 86666 EHRLICHIA ANTIBODY: CPT | Mod: 90

## 2024-09-25 LAB
ANA SER QL IF: NEGATIVE
CCP AB SER IA-ACNC: 0.7 U/ML

## 2024-09-26 LAB
A PHAGOCYTOPH IGG TITR SER IF: NORMAL {TITER}
A PHAGOCYTOPH IGM TITR SER IF: NORMAL {TITER}
B MICROTI IGG TITR SER: NORMAL {TITER}
B MICROTI IGM TITR SER: NORMAL {TITER}

## 2024-09-30 ENCOUNTER — THERAPY VISIT (OUTPATIENT)
Dept: PHYSICAL THERAPY | Facility: CLINIC | Age: 49
End: 2024-09-30
Payer: COMMERCIAL

## 2024-09-30 DIAGNOSIS — M48.061 FORAMINAL STENOSIS OF LUMBAR REGION: ICD-10-CM

## 2024-09-30 DIAGNOSIS — M54.16 LUMBAR RADICULAR PAIN: Primary | ICD-10-CM

## 2024-09-30 DIAGNOSIS — M79.18 MYOFASCIAL PAIN: ICD-10-CM

## 2024-09-30 DIAGNOSIS — M51.26 LUMBAR DISC HERNIATION: ICD-10-CM

## 2024-09-30 DIAGNOSIS — M25.571 PAIN IN JOINT INVOLVING ANKLE AND FOOT, RIGHT: ICD-10-CM

## 2024-09-30 PROCEDURE — 97530 THERAPEUTIC ACTIVITIES: CPT | Mod: GP | Performed by: PHYSICAL THERAPIST

## 2024-09-30 PROCEDURE — 97112 NEUROMUSCULAR REEDUCATION: CPT | Mod: GP | Performed by: PHYSICAL THERAPIST

## 2024-10-07 ENCOUNTER — OFFICE VISIT (OUTPATIENT)
Dept: PHYSICAL MEDICINE AND REHAB | Facility: CLINIC | Age: 49
End: 2024-10-07
Payer: COMMERCIAL

## 2024-10-07 VITALS — HEART RATE: 102 BPM | DIASTOLIC BLOOD PRESSURE: 80 MMHG | SYSTOLIC BLOOD PRESSURE: 131 MMHG

## 2024-10-07 DIAGNOSIS — M48.061 FORAMINAL STENOSIS OF LUMBAR REGION: ICD-10-CM

## 2024-10-07 DIAGNOSIS — M46.1 SACROILIITIS (H): ICD-10-CM

## 2024-10-07 DIAGNOSIS — M54.16 LUMBAR RADICULAR PAIN: Primary | ICD-10-CM

## 2024-10-07 DIAGNOSIS — R10.2 CHRONIC PELVIC PAIN IN FEMALE: ICD-10-CM

## 2024-10-07 DIAGNOSIS — M16.0 OSTEOARTHRITIS OF BOTH HIPS, UNSPECIFIED OSTEOARTHRITIS TYPE: ICD-10-CM

## 2024-10-07 DIAGNOSIS — G89.29 CHRONIC PELVIC PAIN IN FEMALE: ICD-10-CM

## 2024-10-07 DIAGNOSIS — M51.26 LUMBAR DISC HERNIATION: ICD-10-CM

## 2024-10-07 PROCEDURE — 99214 OFFICE O/P EST MOD 30 MIN: CPT | Performed by: PHYSICAL MEDICINE & REHABILITATION

## 2024-10-07 ASSESSMENT — PAIN SCALES - GENERAL: PAINLEVEL: SEVERE PAIN (6)

## 2024-10-07 NOTE — PATIENT INSTRUCTIONS
An MRI was ordered for you today.  You will be contacted by scheduling within 3 days.    If you are not contacted, please call Radiology at [phone].     Diclofenac (which is an anti-inflammatory) medication is prescribed today. Take 1 tablet 2 times a day as needed for pain.This medication should be taken with food and water to prevent any stomach upset. Do not take ibuprofen/Advil/Motrin/Aleve  while you take Diclofenac. Please call if you have any side effects.

## 2024-10-07 NOTE — LETTER
10/7/2024      Shala Morales  [address] [facility]  [place] [id number]      Dear Colleague,    Thank you for referring your patient, Shala Morales, to the [facility]. Please see a copy of my visit note below.    Assessment/Plan:      Shala was seen today for back pain.    Diagnoses and all orders for this visit:    Lumbar radicular pain  -     MR Lumbar Spine w/o Contrast; Future  -     diclofenac (VOLTAREN) 50 MG EC tablet; Take 1 tablet (50 mg) by mouth 2 times daily as needed for moderate pain.    Foraminal stenosis of lumbar region  -     MR Lumbar Spine w/o Contrast; Future  -     MR Pelvis Bone wo Contrast; Future    Lumbar disc herniation  -     MR Lumbar Spine w/o Contrast; Future  -     MR Pelvis Bone wo Contrast; Future    Osteoarthritis of both hips, unspecified osteoarthritis type    Chronic pelvic pain in female  -     diclofenac (VOLTAREN) 50 MG EC tablet; Take 1 tablet (50 mg) by mouth 2 times daily as needed for moderate pain.         Assessment: Pleasant 49 year old female with a history of hypertension, depression, hypothyroidism, history of alcohol use with:     1.  Lumbar spine pain with right greater than left lower extremity radicular pain in an S1 distribution.  History of this happening in the past as well this current episode for 2 months.MRI from 2021 reveals relatively mild degenerative disc disease through the lumbar spine although right foraminal disc herniation at L5-S1 at that time contacting the L5 nerve.  Most recent pain is consistent with S1 radicular symptoms question new herniation but also has cam impingement left greater than right which may result in pelvic joint dysfunction with pseudo radicular pain contributing as well.  No improvement with physical therapy.  Continues to have more significant right leg L5/S1 distribution pain.    2.  Myofascial pain lumbar spine and gluteal region.     3.  Chronic pelvic pain been diagnosed with  potential pudendal neuralgia in the past but this is basically in the left greater than right groin anterior pelvic region as well as in the pelvis itself bilaterally.    4.  Bilateral hip pain with osteoarthritis and cam impingement left greater than right decreased internal rotation of the hips.  Question concomitant AVN contributing to her pelvic pain.    Discussion:    1.  I discussed the diagnosis and treatment options.  Discussed the options of imaging both pelvis and lumbar spine evaluate the hips and intrapelvic region along with the lumbar spine.    2.  Recommend MRI lumbar spine and pelvis to evaluate.    3.  Trial diclofenac 50 mg twice daily as needed.  Discussed side effects of GI issues with Cymbalta.    4.  Follow-up 1 month.  Can potential lumbar epidurals.  Osteopathic manipulative medicine        It was our pleasure caring for your patient today, if there any questions or concerns please do not hesitate to contact us.    Over 30 minutes were spent on the date of the encounter performing chart review, patient visit and documentation in addition to any procedure.    Subjective:   Patient ID: Shala Morales is a 49 year old female.    History of Present Illness: Patient presents for evaluation of low back pain right lower extremity pain greater than left.  Patient has had increased pain.  Has had a history of pelvic floor pain and anterior groin pain/pelvic pain workup from neurologist in the past and had been on gabapentin and Cymbalta has been able to wean down over the past couple of years to Cymbalta 30 mg tried to decrease this to 20 mg recently over the past couple of weeks significant flare of this pain left greater than right anterior groin pelvic floor intra-abdominal/intrapelvic pain which is constant burning pressure pain worse with sitting.    Also having persistent back pain working with PT.  Low back pain right lower extremity pain lumbar spine lateral knee lateral calf.  No  paresthesias but just pain.  Better with standing ice and lidocaine.  Still on Cymbalta back to 50 mg daily and plan to increase this to 30 mg twice a day.  No longer on gabapentin.  Not taking any NSAIDs at this time.  Has been to at least 7 visit PT for the lumbar spine.      Imaging: Lumbar plain films imaging report personally reviewed from July 31.  Multilevel moderate degenerative changes through the discs.  Most significant disc height loss is moderate L5-S1 with anterior lipping L3-4 as well as chronic L1-2 Schmorl's nodes/endplate irregularity.  Facet arthropathy L5-S1 L4-5.  No instability from flexion to extension.  No fractures.    Reviewed the MRI of the sacrum from 2020 showing no abnormality along the nerves of the sacrum exiting the neuroforamen.    No MRI imaging of the hips available.    Review of Systems: Pertinent positives: Headaches.  Pertinent negatives: No numbness, tingling or weakness.  No bowel or bladder incontinence.  No urinary retention.  No fevers, unintentional weight loss, balance changes,  frequent falling, difficulty swallowing, or coordination difficulties.  All others reviewed are negative.       Current Outpatient Medications   Medication Sig Dispense Refill     azelaic acid (FINACIA) 15 % external gel Apply 1 g topically 2 times daily       cetirizine (ZYRTEC) 10 MG tablet Take 1 tablet by mouth daily       COMPOUNDED NON-CONTROLLED SUBSTANCE (CMPD RX) - PHARMACY TO MIX COMPOUNDED MEDICATION Amitriptyline 2%, gabapentin 6%, lidocaine 5% - Apply topically to the vulva, 0.5-1g per application, 3-4x per day as needed. 60 g 2     crisaborole (EUCRISA) 2 % ointment Apply topically 2 times daily       Cyanocobalamin (VITAMIN B 12 PO)        diazepam 10 mg SUPP Place vaginally or rectally daily as needed. 10 suppository 3     CHANCE 0.0375 MG/24HR BIW patch APPLY 1 PATCH TO SKIN TWICE WEEKLY       DULoxetine (CYMBALTA) 60 MG capsule Take 1 capsule (60 mg) by mouth daily. - dose  increase 10/3/24 90 capsule 2     DULoxetine (CYMBALTA) 60 MG capsule Take 1 capsule (60 mg) by mouth At Bedtime 90 capsule 2     estradiol (ESTRACE) 0.1 MG/GM vaginal cream APPLY SMALL AMOUNT TO AFFECTED AREA DAILY FOR 2 WEEKS       ketoconazole (NIZORAL) 2 % external shampoo APPLY TO FACE AND SCALP THEN WASH OFF THREE TIMES A WEEK       lidocaine (XYLOCAINE) 2 % external gel APPLY A SMALL AMOUNT TO THE AFFECTED AREA TOPICALLY TWICE DAILY 30 mL 11     Magnesium Citrate 200 MG TABS        metroNIDAZOLE (METROCREAM) 0.75 % external cream APPLY TO THE FACE TWICE DAILY       polyethylene glycol (MIRALAX) 17 g packet        progesterone (PROMETRIUM) 100 MG capsule Take 1 capsule every day by oral route for 30 days.       TYRVAYA 0.03 MG/ACT nasal spray        vitamin D3 (CHOLECALCIFEROL) 2000 units (50 mcg) tablet Take 1 tablet by mouth daily       XIIDRA 5 % opthalmic solution INSTILL 1 DROP INTO EACH EYE TWICE DAILY AS DIRECTED       zinc gluconate 50 MG tablet        No current facility-administered medications for this visit.     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           Objective:   Physical Exam:    /80   Pulse 102   There is no height or weight on file to calculate BMI.      General: Alert and oriented with normal  affect. Attention, knowledge, memory, and language are intact. No acute distress.   Eyes: Sclerae are clear.  Respirations: Unlabored. CV: No lower extremity edema.     Gait:  Nonantalgic  Decreased range of motion of the hips internal rotation left greater than right with pinching in the left groin with internal rotation of the left hip.  Sensation is intact to light touch throughout the  lower extremities.  Reflexes are 2+ patellar and Achilles      Manual muscle testing reveals:  Right /Left out of 5     5/5 hip flexors  5/5 knee flexors  5/5 knee extensors  5/5 ankle plantar flexors  5/5 ankle dorsiflexors  5/5  EHL       Again, thank you for allowing me to participate in the care of your patient.        Sincerely,        Santhosh Peck, DO

## 2024-10-07 NOTE — PROGRESS NOTES
Assessment/Plan:      Shala was seen today for back pain.    Diagnoses and all orders for this visit:    Lumbar radicular pain  -     MR Lumbar Spine w/o Contrast; Future  -     diclofenac (VOLTAREN) 50 MG EC tablet; Take 1 tablet (50 mg) by mouth 2 times daily as needed for moderate pain.    Foraminal stenosis of lumbar region  -     MR Lumbar Spine w/o Contrast; Future  -     MR Pelvis Bone wo Contrast; Future    Lumbar disc herniation  -     MR Lumbar Spine w/o Contrast; Future  -     MR Pelvis Bone wo Contrast; Future    Osteoarthritis of both hips, unspecified osteoarthritis type    Chronic pelvic pain in female  -     diclofenac (VOLTAREN) 50 MG EC tablet; Take 1 tablet (50 mg) by mouth 2 times daily as needed for moderate pain.         Assessment: Pleasant 49 year old female with a history of hypertension, depression, hypothyroidism, history of alcohol use with:     1.  Lumbar spine pain with right greater than left lower extremity radicular pain in an S1 distribution.  History of this happening in the past as well this current episode for 2 months.MRI from 2021 reveals relatively mild degenerative disc disease through the lumbar spine although right foraminal disc herniation at L5-S1 at that time contacting the L5 nerve.  Most recent pain is consistent with S1 radicular symptoms question new herniation but also has cam impingement left greater than right which may result in pelvic joint dysfunction with pseudo radicular pain contributing as well.  No improvement with physical therapy.  Continues to have more significant right leg L5/S1 distribution pain.    2.  Myofascial pain lumbar spine and gluteal region.     3.  Chronic pelvic pain been diagnosed with potential pudendal neuralgia in the past but this is basically in the left greater than right groin anterior pelvic region as well as in the pelvis itself bilaterally.    4.  Bilateral hip pain with osteoarthritis and cam impingement left greater than  right decreased internal rotation of the hips.  Question concomitant AVN contributing to her pelvic pain.    Discussion:    1.  I discussed the diagnosis and treatment options.  Discussed the options of imaging both pelvis and lumbar spine evaluate the hips and intrapelvic region along with the lumbar spine.    2.  Recommend MRI lumbar spine and pelvis to evaluate.    3.  Trial diclofenac 50 mg twice daily as needed.  Discussed side effects of GI issues with Cymbalta.    4.  Follow-up 1 month.  Can potential lumbar epidurals.  Osteopathic manipulative medicine        It was our pleasure caring for your patient today, if there any questions or concerns please do not hesitate to contact us.    Over 30 minutes were spent on the date of the encounter performing chart review, patient visit and documentation in addition to any procedure.    Subjective:   Patient ID: Shala Morales is a 49 year old female.    History of Present Illness: Patient presents for evaluation of low back pain right lower extremity pain greater than left.  Patient has had increased pain.  Has had a history of pelvic floor pain and anterior groin pain/pelvic pain workup from neurologist in the past and had been on gabapentin and Cymbalta has been able to wean down over the past couple of years to Cymbalta 30 mg tried to decrease this to 20 mg recently over the past couple of weeks significant flare of this pain left greater than right anterior groin pelvic floor intra-abdominal/intrapelvic pain which is constant burning pressure pain worse with sitting.    Also having persistent back pain working with PT.  Low back pain right lower extremity pain lumbar spine lateral knee lateral calf.  No paresthesias but just pain.  Better with standing ice and lidocaine.  Still on Cymbalta back to 50 mg daily and plan to increase this to 30 mg twice a day.  No longer on gabapentin.  Not taking any NSAIDs at this time.  Has been to at least 7 visit PT for the  lumbar spine.      Imaging: Lumbar plain films imaging report personally reviewed from July 31.  Multilevel moderate degenerative changes through the discs.  Most significant disc height loss is moderate L5-S1 with anterior lipping L3-4 as well as chronic L1-2 Schmorl's nodes/endplate irregularity.  Facet arthropathy L5-S1 L4-5.  No instability from flexion to extension.  No fractures.    Reviewed the MRI of the sacrum from 2020 showing no abnormality along the nerves of the sacrum exiting the neuroforamen.    No MRI imaging of the hips available.    Review of Systems: Pertinent positives: Headaches.  Pertinent negatives: No numbness, tingling or weakness.  No bowel or bladder incontinence.  No urinary retention.  No fevers, unintentional weight loss, balance changes,  frequent falling, difficulty swallowing, or coordination difficulties.  All others reviewed are negative.       Current Outpatient Medications   Medication Sig Dispense Refill    azelaic acid (FINACIA) 15 % external gel Apply 1 g topically 2 times daily      cetirizine (ZYRTEC) 10 MG tablet Take 1 tablet by mouth daily      COMPOUNDED NON-CONTROLLED SUBSTANCE (CMPD RX) - PHARMACY TO MIX COMPOUNDED MEDICATION Amitriptyline 2%, gabapentin 6%, lidocaine 5% - Apply topically to the vulva, 0.5-1g per application, 3-4x per day as needed. 60 g 2    crisaborole (EUCRISA) 2 % ointment Apply topically 2 times daily      Cyanocobalamin (VITAMIN B 12 PO)       diazepam 10 mg SUPP Place vaginally or rectally daily as needed. 10 suppository 3    CHANCE 0.0375 MG/24HR BIW patch APPLY 1 PATCH TO SKIN TWICE WEEKLY      DULoxetine (CYMBALTA) 60 MG capsule Take 1 capsule (60 mg) by mouth daily. - dose increase 10/3/24 90 capsule 2    DULoxetine (CYMBALTA) 60 MG capsule Take 1 capsule (60 mg) by mouth At Bedtime 90 capsule 2    estradiol (ESTRACE) 0.1 MG/GM vaginal cream APPLY SMALL AMOUNT TO AFFECTED AREA DAILY FOR 2 WEEKS      ketoconazole (NIZORAL) 2 % external shampoo  APPLY TO FACE AND SCALP THEN WASH OFF THREE TIMES A WEEK      lidocaine (XYLOCAINE) 2 % external gel APPLY A SMALL AMOUNT TO THE AFFECTED AREA TOPICALLY TWICE DAILY 30 mL 11    Magnesium Citrate 200 MG TABS       metroNIDAZOLE (METROCREAM) 0.75 % external cream APPLY TO THE FACE TWICE DAILY      polyethylene glycol (MIRALAX) 17 g packet       progesterone (PROMETRIUM) 100 MG capsule Take 1 capsule every day by oral route for 30 days.      TYRVAYA 0.03 MG/ACT nasal spray       vitamin D3 (CHOLECALCIFEROL) 2000 units (50 mcg) tablet Take 1 tablet by mouth daily      XIIDRA 5 % opthalmic solution INSTILL 1 DROP INTO EACH EYE TWICE DAILY AS DIRECTED      zinc gluconate 50 MG tablet        No current facility-administered medications for this visit.     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           Objective:   Physical Exam:    /80   Pulse 102   There is no height or weight on file to calculate BMI.      General: Alert and oriented with normal affect. Attention, knowledge, memory, and language are intact. No acute distress.   Eyes: Sclerae are clear.  Respirations: Unlabored. CV: No lower extremity edema.     Gait:  Nonantalgic  Decreased range of motion of the hips internal rotation left greater than right with pinching in the left  groin with internal rotation of the left hip.  Sensation is intact to light touch throughout the  lower extremities.  Reflexes are 2+ patellar and Achilles      Manual muscle testing reveals:  Right /Left out of 5     5/5 hip flexors  5/5 knee flexors  5/5 knee extensors  5/5 ankle plantar flexors  5/5 ankle dorsiflexors  5/5  EHL

## 2024-10-14 ENCOUNTER — PATIENT OUTREACH (OUTPATIENT)
Dept: CARE COORDINATION | Facility: CLINIC | Age: 49
End: 2024-10-14
Payer: COMMERCIAL

## 2024-10-16 ENCOUNTER — HOSPITAL ENCOUNTER (OUTPATIENT)
Dept: MRI IMAGING | Facility: CLINIC | Age: 49
Discharge: HOME | End: 2024-10-16
Attending: PHYSICAL MEDICINE & REHABILITATION
Payer: COMMERCIAL

## 2024-10-16 DIAGNOSIS — M51.26 LUMBAR DISC HERNIATION: ICD-10-CM

## 2024-10-16 DIAGNOSIS — M48.061 FORAMINAL STENOSIS OF LUMBAR REGION: ICD-10-CM

## 2024-10-16 DIAGNOSIS — M54.16 LUMBAR RADICULAR PAIN: ICD-10-CM

## 2024-10-16 PROCEDURE — 72195 MRI PELVIS W/O DYE: CPT

## 2024-10-16 PROCEDURE — 72195 MRI PELVIS W/O DYE: CPT | Mod: 26 | Performed by: RADIOLOGY

## 2024-10-16 PROCEDURE — 72148 MRI LUMBAR SPINE W/O DYE: CPT

## 2024-10-22 ENCOUNTER — LAB (OUTPATIENT)
Dept: LAB | Facility: CLINIC | Age: 49
End: 2024-10-22
Payer: COMMERCIAL

## 2024-10-22 DIAGNOSIS — M46.1 SACROILIITIS (H): ICD-10-CM

## 2024-10-22 LAB
CK SERPL-CCNC: 64 U/L (ref 26–192)
CRP SERPL-MCNC: 3.03 MG/L
ERYTHROCYTE [SEDIMENTATION RATE] IN BLOOD BY WESTERGREN METHOD: 10 MM/HR (ref 0–20)
RHEUMATOID FACT SERPL-ACNC: <10 IU/ML

## 2024-10-22 PROCEDURE — 86235 NUCLEAR ANTIGEN ANTIBODY: CPT

## 2024-10-22 PROCEDURE — 86235 NUCLEAR ANTIGEN ANTIBODY: CPT | Mod: 59

## 2024-10-22 PROCEDURE — 85652 RBC SED RATE AUTOMATED: CPT

## 2024-10-22 PROCEDURE — 86140 C-REACTIVE PROTEIN: CPT

## 2024-10-22 PROCEDURE — 82550 ASSAY OF CK (CPK): CPT

## 2024-10-22 PROCEDURE — 86038 ANTINUCLEAR ANTIBODIES: CPT

## 2024-10-22 PROCEDURE — 86200 CCP ANTIBODY: CPT

## 2024-10-22 PROCEDURE — 86431 RHEUMATOID FACTOR QUANT: CPT

## 2024-10-22 PROCEDURE — 36415 COLL VENOUS BLD VENIPUNCTURE: CPT

## 2024-10-23 LAB — ANA SER QL IF: NEGATIVE

## 2024-10-24 DIAGNOSIS — N94.819 VULVODYNIA: ICD-10-CM

## 2024-10-24 LAB
CCP AB SER IA-ACNC: 1 U/ML
ENA SS-A AB SER IA-ACNC: <0.5 U/ML
ENA SS-A AB SER IA-ACNC: NEGATIVE
ENA SS-B IGG SER IA-ACNC: <0.6 U/ML
ENA SS-B IGG SER IA-ACNC: NEGATIVE

## 2024-10-29 DIAGNOSIS — R20.2 PARESTHESIAS: Primary | ICD-10-CM

## 2024-10-29 RX ORDER — LIDOCAINE/PRILOCAINE 2.5 %-2.5%
CREAM (GRAM) TOPICAL
Qty: 30 G | Refills: 11 | Status: SHIPPED | OUTPATIENT
Start: 2024-10-29 | End: 2024-10-30

## 2024-10-30 ENCOUNTER — OFFICE VISIT (OUTPATIENT)
Dept: DERMATOLOGY | Facility: CLINIC | Age: 49
End: 2024-10-30
Payer: COMMERCIAL

## 2024-10-30 ENCOUNTER — TELEPHONE (OUTPATIENT)
Dept: DERMATOLOGY | Facility: CLINIC | Age: 49
End: 2024-10-30

## 2024-10-30 DIAGNOSIS — L71.9 ACNE ROSACEA: ICD-10-CM

## 2024-10-30 DIAGNOSIS — L21.9 DERMATITIS, SEBORRHEIC: Primary | ICD-10-CM

## 2024-10-30 DIAGNOSIS — L20.9 ATOPIC DERMATITIS, UNSPECIFIED TYPE: ICD-10-CM

## 2024-10-30 PROCEDURE — 99203 OFFICE O/P NEW LOW 30 MIN: CPT | Performed by: PHYSICIAN ASSISTANT

## 2024-10-30 RX ORDER — METRONIDAZOLE 7.5 MG/G
GEL TOPICAL
Qty: 45 G | Refills: 11 | Status: SHIPPED | OUTPATIENT
Start: 2024-10-30 | End: 2024-10-30 | Stop reason: ALTCHOICE

## 2024-10-30 RX ORDER — KETOCONAZOLE 20 MG/ML
SHAMPOO, SUSPENSION TOPICAL
Qty: 120 ML | Refills: 6 | Status: SHIPPED | OUTPATIENT
Start: 2024-10-30

## 2024-10-30 NOTE — LETTER
To whom it may concern,    This letter is written on behalf of Shala Morales to appeal denial of Eucerisa. Patient is under my care for facial eczema, specifically eyelids. She has been using Eucerisa with her previous dermatologist and would like refills. She has had great success and no side effects with Eucerisa.     Patient has tried protopic and elidel in the past for her facial eczema which she did not tolerate due to burning sensation. Topical steroids are not a good long term option since this is eczema on the face that we are treating and there is a big concern for steroid atrophy.     Please cover Eucerisa for patient's eczema since this has been effective without side effects for patient.       Sincerely,  Lori Cote PA-C

## 2024-10-30 NOTE — LETTER
10/30/2024      Shala Morales  [address] [id number]      Dear Colleague,    Thank you for referring your patient, Shala Morales, to the [facility]. Please see a copy of my visit note below.    Shala Morales is a pleasant 49 year old year old female patient here today for rosacea, seborrheic dermatitis, and facial eczema. She notes she using eucrisa for eyelid eczema, she has tried and failed tacrolimus and elidel which caused burning. She notes metrocream controls acne rosacea. She also uses ketoconazole to help with her seborrheic dermatitis.  Patient has no other skin complaints today.  Remainder of the HPI, Meds, PMH, Allergies, FH, and SH was reviewed in chart.    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     HTN (hypertension)      Motion sickness      Thyroid nodule 2/13/2024       Past Surgical History:   Procedure Laterality Date     BIOPSY  4/2018    Cervical biopsy     COLONOSCOPY N/A 2/21/2022    Procedure: COLONOSCOPY, FLEXIBLE, WITH LESION REMOVAL USING SNARE;  Surgeon: Samson Menjivar MD;  Location: [facility] OR     COLONOSCOPY WITH CO2 INSUFFLATION N/A 2/21/2022    Procedure: COLONOSCOPY, WITH CO2 INSUFFLATION;  Surgeon: Samson Menjivar MD;  Location: [facility] OR        Family History   Problem Relation Age of Onset     Thyroid Disease Mother      Hypertension Mother      Atrial fibrillation Mother      Hyperlipidemia Mother      Depression Mother      Anesthesia Reaction Mother      Osteoporosis Mother      Diabetes Father      Hypertension Father      Depression Father      Mental Illness Father      Obesity Father      Thyroid Disease Maternal Grandmother      Hypertension Maternal Grandmother      Osteoporosis Maternal Grandmother      Hypertension Maternal Grandfather       Hypertension Paternal Grandmother      Obesity Paternal Grandmother      Colon Cancer Paternal Grandfather      Diabetes Paternal Grandfather      Hypertension Paternal Grandfather      Prostate Cancer Paternal Grandfather      Anxiety Disorder Cousin        Social History     Socioeconomic History     Marital status:      Spouse name: Not on file     Number of children: Not on file     Years of education: Not on file     Highest education level: Not on file   Occupational History     Not on file   Tobacco Use     Smoking status: Former     Current packs/day: 0.00     Average packs/day: 1 pack/day for 25.7 years (25.7 ttl pk-yrs)     Types: Cigarettes     Start date: 1994     Quit date: 10/1/2019     Years since quittin.0     Passive exposure: Past     Smokeless tobacco: Never     Tobacco comments:     Stopped smoking cigarettes  but then started using E cigarette which I then quit using    Vaping Use     Vaping status: Never Used   Substance and Sexual Activity     Alcohol use: Not Currently     Comment: Recovering alcoholic, 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 No   Social History Narrative     Not on file     Social Drivers of Health     Financial Resource Strain: Low Risk  (2024)    Financial Resource Strain      Within the past 12 months, have you or your family members you live with been unable to get utilities (heat, electricity) when it was really needed?: No   Food Insecurity: Low Risk  (2024)    Food Insecurity      Within the past 12 months, did you worry that your food would run out before you got money to buy more?: No      Within the past 12 months, did the food you bought just not last and you didn t have money to get more?: No   Transportation Needs: Low Risk  (2024)    Transportation Needs       Within the past 12 months, has lack of transportation kept you from medical appointments, getting your medicines, non-medical meetings or appointments, work, or from getting things that you need?: No   Physical Activity: Insufficiently Active (2/12/2024)    Exercise Vital Sign      Days of Exercise per Week: 3 days      Minutes of Exercise per Session: 40 min   Stress: No Stress Concern Present (2/12/2024)    [employer] - Occupational Stress Questionnaire      Feeling of Stress : Only a little   Social Connections: Unknown (2/12/2024)    Social Connection and Isolation Panel [NHANES]      Frequency of Communication with Friends and Family: Not on file      Frequency of Social Gatherings with Friends and Family: Once a week      Attends Holiness Services: Not on file      Active Member of Clubs or Organizations: Not on file      Attends Club or Organization Meetings: Not on file      Marital Status: Not on file   Interpersonal Safety: Low Risk  (2/13/2024)    Interpersonal Safety      Do you feel physically and emotionally safe where you currently live?: Yes      Within the past 12 months, have you been hit, slapped, kicked or otherwise physically hurt by someone?: No      Within the past 12 months, have you been humiliated or emotionally abused in other ways by your partner or ex-partner?: No   Housing Stability: Low Risk  (2/12/2024)    Housing Stability      Do you have housing? : Yes      Are you worried about losing your housing?: No       Outpatient Encounter Medications as of 10/30/2024   Medication Sig Dispense Refill     cetirizine (ZYRTEC) 10 MG tablet Take 1 tablet by mouth daily       COMPOUNDED NON-CONTROLLED SUBSTANCE (CMPD RX) - PHARMACY TO MIX COMPOUNDED MEDICATION Amitriptyline 2%, gabapentin 6%, lidocaine 5% - Apply topically to the vulva, 0.5-1g per application, 3-4x per day as needed. 60 g 2     crisaborole (EUCRISA) 2 % ointment Apply twice daily to eyelid  eczema. 60 g 3     Cyanocobalamin (VITAMIN B 12 PO)        diclofenac (VOLTAREN) 50 MG EC tablet Take 1 tablet (50 mg) by mouth 2 times daily as needed for moderate pain. 60 tablet 1     DULoxetine (CYMBALTA) 60 MG capsule Take 1 capsule (60 mg) by mouth daily. - dose increase 10/3/24 90 capsule 2     estradiol (ESTRACE) 0.1 MG/GM vaginal cream APPLY SMALL AMOUNT TO AFFECTED AREA DAILY FOR 2 WEEKS       ketoconazole (NIZORAL) 2 % external shampoo Leave on scalp for 5 minutes the rinse. Use 2-3 times weekly. 120 mL 6     Magnesium Citrate 200 MG TABS        metroNIDAZOLE (METROCREAM) 0.75 % external cream Apply twice daily to face. 45 g 5     polyethylene glycol (MIRALAX) 17 g packet        progesterone (PROMETRIUM) 100 MG capsule Take 1 capsule every day by oral route for 30 days.       TYRVAYA 0.03 MG/ACT nasal spray        vitamin D3 (CHOLECALCIFEROL) 2000 units (50 mcg) tablet Take 1 tablet by mouth daily       XIIDRA 5 % opthalmic solution INSTILL 1 DROP INTO EACH EYE TWICE DAILY AS DIRECTED       zinc gluconate 50 MG tablet        [DISCONTINUED] lidocaine-prilocaine (EMLA) 2.5-2.5 % external cream Please specify directions, refills and quantity 30 g 11     diazepam 10 mg SUPP Place vaginally or rectally daily as needed. (Patient not taking: Reported on 10/30/2024) 10 suppository 3     CHANCE 0.0375 MG/24HR BIW patch APPLY 1 PATCH TO SKIN TWICE WEEKLY       DULoxetine (CYMBALTA) 60 MG capsule Take 1 capsule (60 mg) by mouth At Bedtime 90 capsule 2     [DISCONTINUED] azelaic acid (FINACIA) 15 % external gel Apply 1 g topically 2 times daily (Patient not taking: Reported on 10/30/2024)       [DISCONTINUED] crisaborole (EUCRISA) 2 % ointment Apply topically 2 times daily       [DISCONTINUED] ketoconazole (NIZORAL) 2 % external shampoo APPLY TO FACE AND SCALP THEN WASH OFF THREE TIMES A WEEK       [DISCONTINUED] metroNIDAZOLE (METROCREAM) 0.75 % external cream APPLY TO THE FACE TWICE DAILY       [DISCONTINUED]  metroNIDAZOLE (METROGEL) 0.75 % external gel Apply twice daily to face. 45 g 11     Facility-Administered Encounter Medications as of 10/30/2024   Medication Dose Route Frequency Provider Last Rate Last Admin     sodium chloride (PF) 0.9% PF flush 60 mL  60 mL Intravenous Once Lewis Daugherty MD                 O:   NAD, WDWN, Alert & Oriented, Mood & Affect wnl, Vitals stable   Here today alone   There were no vitals taken for this visit.   General appearance normal   Vitals stable   Alert, oriented and in no acute distress      Skin is clear today     Eyes: Conjunctivae/lids:Normal     ENT: Lips normal    MSK:Normal    Pulm: Breathing Normal    Neuro/Psych: Orientation:Alert and Orientedx3 ; Mood/Affect:normal     A/P:  1. Seborrheic Dermatitis   Refilled ketoconazole cream, use daily.   2. Acne rosacea  Refilled metrocream, can use twice daily.   3. Eyelid eczema  Refilled eucrisa daily to eyelids.       Again, thank you for allowing me to participate in the care of your patient.        Sincerely,        Lori Taylor PA-C

## 2024-10-30 NOTE — TELEPHONE ENCOUNTER
Central Prior Authorization Team  Phone: [phone]    PRIOR AUTHORIZATION DENIED    Medication: EUCRISA 2 % EX OINT  Insurance Company: [employer] ([facility]) - Phone [phone] Fax [phone]  Denial Date: 10/30/2024  Denial Reason(s):         Appeal Information:         Patient Notified: NO- Unfortunately, we cannot call the patient with denials because we do not know what next steps the MD will take nor can we give medical advice, please notify the patient of what they are to expect for the continuation of their therapy from the provider.

## 2024-10-31 NOTE — PROGRESS NOTES
Shala Morales is a pleasant 49 year old year old female patient here today for rosacea, seborrheic dermatitis, and facial eczema. She notes she using eucrisa for eyelid eczema, she has tried and failed tacrolimus and elidel which caused burning. She notes metrocream controls acne rosacea. She also uses ketoconazole to help with her seborrheic dermatitis.  Patient has no other skin complaints today.  Remainder of the HPI, Meds, PMH, Allergies, FH, and SH was reviewed in chart.    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    HTN (hypertension)     Motion sickness     Thyroid nodule 2/13/2024       Past Surgical History:   Procedure Laterality Date    BIOPSY  4/2018    Cervical biopsy    COLONOSCOPY N/A 2/21/2022    Procedure: COLONOSCOPY, FLEXIBLE, WITH LESION REMOVAL USING SNARE;  Surgeon: Samson Menjivar MD;  Location: [facility] OR    COLONOSCOPY WITH CO2 INSUFFLATION N/A 2/21/2022    Procedure: COLONOSCOPY, WITH CO2 INSUFFLATION;  Surgeon: Samson Menjivar MD;  Location: [facility]        Family History   Problem Relation Age of Onset    Thyroid Disease Mother     Hypertension Mother     Atrial fibrillation Mother     Hyperlipidemia Mother     Depression Mother     Anesthesia Reaction Mother     Osteoporosis Mother     Diabetes Father     Hypertension Father     Depression Father     Mental Illness Father     Obesity Father     Thyroid Disease Maternal Grandmother     Hypertension Maternal Grandmother     Osteoporosis Maternal Grandmother     Hypertension Maternal Grandfather     Hypertension Paternal Grandmother     Obesity Paternal Grandmother     Colon Cancer Paternal Grandfather     Diabetes Paternal Grandfather     Hypertension Paternal Grandfather     Prostate Cancer Paternal Grandfather     Anxiety Disorder Cousin        Social History      Socioeconomic History    Marital status:      Spouse name: Not on file    Number of children: Not on file    Years of education: Not on file    Highest education level: Not on file   Occupational History    Not on file   Tobacco Use    Smoking status: Former     Current packs/day: 0.00     Average packs/day: 1 pack/day for 25.7 years (25.7 ttl pk-yrs)     Types: Cigarettes     Start date: 1994     Quit date: 10/1/2019     Years since quittin.0     Passive exposure: Past    Smokeless tobacco: Never    Tobacco comments:     Stopped smoking cigarettes  but then started using E cigarette which I then quit using    Vaping Use    Vaping status: Never Used   Substance and Sexual Activity    Alcohol use: Not Currently     Comment: Recovering alcoholic, sobriety date 3/21/2012    Drug use: Never    Sexual activity: Yes     Partners: Male     Birth control/protection: Condom   Other Topics Concern    Parent/sibling w/ CABG, MI or angioplasty before 65F 55M? No   Social History Narrative    Not on file     Social Drivers of Health     Financial Resource Strain: Low Risk  (2024)    Financial Resource Strain     Within the past 12 months, have you or your family members you live with been unable to get utilities (heat, electricity) when it was really needed?: No   Food Insecurity: Low Risk  (2024)    Food Insecurity     Within the past 12 months, did you worry that your food would run out before you got money to buy more?: No     Within the past 12 months, did the food you bought just not last and you didn t have money to get more?: No   Transportation Needs: Low Risk  (2024)    Transportation Needs     Within the past 12 months, has lack of transportation kept you from medical appointments, getting your medicines, non-medical meetings or appointments, work, or from getting things that you need?: No   Physical Activity: Insufficiently Active (2024)    Exercise Vital Sign     Days  of Exercise per Week: 3 days     Minutes of Exercise per Session: 40 min   Stress: No Stress Concern Present (2/12/2024)    [employer] - Occupational Stress Questionnaire     Feeling of Stress : Only a little   Social Connections: Unknown (2/12/2024)    Social Connection and Isolation Panel [NHANES]     Frequency of Communication with Friends and Family: Not on file     Frequency of Social Gatherings with Friends and Family: Once a week     Attends Anglican Services: Not on file     Active Member of Clubs or Organizations: Not on file     Attends Club or Organization Meetings: Not on file     Marital Status: Not on file   Interpersonal Safety: Low Risk  (2/13/2024)    Interpersonal Safety     Do you feel physically and emotionally safe where you currently live?: Yes     Within the past 12 months, have you been hit, slapped, kicked or otherwise physically hurt by someone?: No     Within the past 12 months, have you been humiliated or emotionally abused in other ways by your partner or ex-partner?: No   Housing Stability: Low Risk  (2/12/2024)    Housing Stability     Do you have housing? : Yes     Are you worried about losing your housing?: No       Outpatient Encounter Medications as of 10/30/2024   Medication Sig Dispense Refill    cetirizine (ZYRTEC) 10 MG tablet Take 1 tablet by mouth daily      COMPOUNDED NON-CONTROLLED SUBSTANCE (CMPD RX) - PHARMACY TO MIX COMPOUNDED MEDICATION Amitriptyline 2%, gabapentin 6%, lidocaine 5% - Apply topically to the vulva, 0.5-1g per application, 3-4x per day as needed. 60 g 2    crisaborole (EUCRISA) 2 % ointment Apply twice daily to eyelid eczema. 60 g 3    Cyanocobalamin (VITAMIN B 12 PO)       diclofenac (VOLTAREN) 50 MG EC tablet Take 1 tablet (50 mg) by mouth 2 times daily as needed for moderate pain. 60 tablet 1    DULoxetine (CYMBALTA) 60 MG capsule Take 1 capsule (60 mg) by mouth daily. - dose increase 10/3/24 90 capsule 2    estradiol  (ESTRACE) 0.1 MG/GM vaginal cream APPLY SMALL AMOUNT TO AFFECTED AREA DAILY FOR 2 WEEKS      ketoconazole (NIZORAL) 2 % external shampoo Leave on scalp for 5 minutes the rinse. Use 2-3 times weekly. 120 mL 6    Magnesium Citrate 200 MG TABS       metroNIDAZOLE (METROCREAM) 0.75 % external cream Apply twice daily to face. 45 g 5    polyethylene glycol (MIRALAX) 17 g packet       progesterone (PROMETRIUM) 100 MG capsule Take 1 capsule every day by oral route for 30 days.      TYRVAYA 0.03 MG/ACT nasal spray       vitamin D3 (CHOLECALCIFEROL) 2000 units (50 mcg) tablet Take 1 tablet by mouth daily      XIIDRA 5 % opthalmic solution INSTILL 1 DROP INTO EACH EYE TWICE DAILY AS DIRECTED      zinc gluconate 50 MG tablet       [DISCONTINUED] lidocaine-prilocaine (EMLA) 2.5-2.5 % external cream Please specify directions, refills and quantity 30 g 11    diazepam 10 mg SUPP Place vaginally or rectally daily as needed. (Patient not taking: Reported on 10/30/2024) 10 suppository 3    CHANCE 0.0375 MG/24HR BIW patch APPLY 1 PATCH TO SKIN TWICE WEEKLY      DULoxetine (CYMBALTA) 60 MG capsule Take 1 capsule (60 mg) by mouth At Bedtime 90 capsule 2    [DISCONTINUED] azelaic acid (FINACIA) 15 % external gel Apply 1 g topically 2 times daily (Patient not taking: Reported on 10/30/2024)      [DISCONTINUED] crisaborole (EUCRISA) 2 % ointment Apply topically 2 times daily      [DISCONTINUED] ketoconazole (NIZORAL) 2 % external shampoo APPLY TO FACE AND SCALP THEN WASH OFF THREE TIMES A WEEK      [DISCONTINUED] metroNIDAZOLE (METROCREAM) 0.75 % external cream APPLY TO THE FACE TWICE DAILY      [DISCONTINUED] metroNIDAZOLE (METROGEL) 0.75 % external gel Apply twice daily to face. 45 g 11     Facility-Administered Encounter Medications as of 10/30/2024   Medication Dose Route Frequency Provider Last Rate Last Admin    sodium chloride (PF) 0.9% PF flush 60 mL  60 mL Intravenous Once Lewis Daugherty MD                 O:   NAD, WDWN, Alert  & Oriented, Mood & Affect wnl, Vitals stable   Here today alone   There were no vitals taken for this visit.   General appearance normal   Vitals stable   Alert, oriented and in no acute distress      Skin is clear today     Eyes: Conjunctivae/lids:Normal     ENT: Lips normal    MSK:Normal    Pulm: Breathing Normal    Neuro/Psych: Orientation:Alert and Orientedx3 ; Mood/Affect:normal     A/P:  1. Seborrheic Dermatitis   Refilled ketoconazole cream, use daily.   2. Acne rosacea  Refilled metrocream, can use twice daily.   3. Eyelid eczema  Refilled eucrisa daily to eyelids.

## 2024-10-31 NOTE — TELEPHONE ENCOUNTER
Central Prior Authorization Team  Phone: [phone]    Medication Appeal Initiation    Medication: EUCRISA 2 % EX OINT  Appeal Start Date:  10/31/2024  Insurance Company: [employer]  Insurance Phone: [phone]  Insurance Fax: [phone]  Comments:   FAXED LMN TO INSURANCE

## 2024-11-01 NOTE — TELEPHONE ENCOUNTER
Received fax from [facility] regarding the eucrisa 2% ointment.     They are missing clinical information stating request for medication is incomplete and cannot be porcessed.     Faxing to [facility] for provider to review.     Nel DHALIWAL RN BSN  [facility]  [phone]

## 2024-11-01 NOTE — TELEPHONE ENCOUNTER
I faxed the form to [place]. Please let me know if you have not received it so I can refax if needed.     Nel DHALIWAL RN BSN  [facility]  [phone]

## 2024-11-04 ENCOUNTER — OFFICE VISIT (OUTPATIENT)
Dept: PHYSICAL MEDICINE AND REHAB | Facility: CLINIC | Age: 49
End: 2024-11-04
Payer: COMMERCIAL

## 2024-11-04 VITALS — SYSTOLIC BLOOD PRESSURE: 123 MMHG | HEART RATE: 76 BPM | DIASTOLIC BLOOD PRESSURE: 68 MMHG

## 2024-11-04 DIAGNOSIS — M48.061 FORAMINAL STENOSIS OF LUMBAR REGION: ICD-10-CM

## 2024-11-04 DIAGNOSIS — M16.0 OSTEOARTHRITIS OF BOTH HIPS, UNSPECIFIED OSTEOARTHRITIS TYPE: ICD-10-CM

## 2024-11-04 DIAGNOSIS — M25.552 HIP PAIN, LEFT: Primary | ICD-10-CM

## 2024-11-04 DIAGNOSIS — R10.2 CHRONIC PELVIC PAIN IN FEMALE: ICD-10-CM

## 2024-11-04 DIAGNOSIS — M25.852 HIP IMPINGEMENT SYNDROME, LEFT: ICD-10-CM

## 2024-11-04 DIAGNOSIS — M46.1 SACROILIITIS (H): ICD-10-CM

## 2024-11-04 DIAGNOSIS — G89.29 CHRONIC PELVIC PAIN IN FEMALE: ICD-10-CM

## 2024-11-04 PROCEDURE — 99214 OFFICE O/P EST MOD 30 MIN: CPT | Performed by: PHYSICAL MEDICINE & REHABILITATION

## 2024-11-04 ASSESSMENT — PAIN SCALES - GENERAL: PAINLEVEL_OUTOF10: MODERATE PAIN (4)

## 2024-11-04 NOTE — LETTER
11/4/2024      Shala Morales  [address] [address]      Dear Colleague,    Thank you for referring your patient, Shala Morales, to the [facility]. Please see a copy of my visit note below.    Assessment/Plan:      Shala was seen today for back pain.    Diagnoses and all orders for this visit:    Hip pain, left  -     PAIN US Large Joint Injection Unilateral; Future    Hip impingement syndrome, left  -     PAIN US Large Joint Injection Unilateral; Future    Osteoarthritis of both hips, unspecified osteoarthritis type  -     PAIN US Large Joint Injection Unilateral; Future    Sacroiliitis (H)    Chronic pelvic pain in female    Foraminal stenosis of lumbar region         Assessment: Pleasant 49 year old female with a history of hypertension, depression, hypothyroidism, history of alcohol use with:     1.  Severe left groin pain greater than right consistent with left hip osteoarthritis.  She has decreased internal and external rotation and positive fair test and BAO test for hip pain I suspect her referred pain to the groin and pelvic region is likely related to hip osteoarthritis.     2.  Lumbar spine pain with right greater than left lower extremity radicular pain in an S1 distribution.  History of this happening in the past as well this current episode for 2 months.new MRI shows relatively mild to moderate degenerative changes about the lumbar spine without any high-grade central canal or foraminal stenosis no compression of the S1 nerves.  MRI of the pelvis shows inflammatory changes of the right SI joint which can be consistent with asymmetric sacroiliitis and this could represent pseudo radiculopathy.  There is also severe right moderate left foraminal stenosis L5-S1 which may contribute to some of the right lower extremity radicular symptoms.   No improvement with physical therapy.         2.  Myofascial pain lumbar spine and gluteal region.      3.  Chronic pelvic pain been diagnosed with potential pudendal neuralgia in the past but this is basically in the left greater than right groin anterior pelvic region as well as in the pelvis itself bilaterally.            Discussion:    1.  We discussed the diagnosis and treatment options.  We discussed option of SI joint injection for the right-sided low back although the left hip is much greater than right today.  I believe that much of her hip and groin and pelvic pain are related to the hip joint osteoarthritis with labral tearing and cam impingement.    2.  Recommend left hip injection under ultrasound.  This will be ordered.    3.  Will monitor how much improvement she has with the left hip injection and can consider further imaging of the left hip for surgical evaluation.    4.  Follow-up at earliest convenience for injection.    It was our pleasure caring for your patient today, if there any questions or concerns please do not hesitate to contact us.      Subjective:   Patient ID: Shala Morales is a 49 year old female.    History of Present Illness: Patient presents for follow-up of left hip pain more than right and right low back pain greater than left.  Left hip and groin is the most significant pain.  Some improvement with diclofenac but still having severe pain with sitting or walking pain is 7/10 at worst 4/10 today 3/10 at best.  Limited walking heating pad medications do help also still has the right-sided low back pain that it is more bearable.  Subbing loss of sensation of vaginal pain and loss of sensation with her bladder is full.  Still on Cymbalta as well.  MRI of the lumbar spine and pelvis have been reviewed.  Has some right-sided leg pain to the foot as well intermittently.  I reviewed the notes and  performed a left hip injection in September 2021.  Patient reports some of the pain improved but hard for her to remember.    Labs: SSA SSB normal rheumatoid factor normal less than  10, ESR normal at 10, CRP -3.03, CK normal 64 CCP -1.0 and NANCY negative.    Imaging: MRI of the pelvis and lumbar spine images and report personally reviewed for medical decision-making purposes.  Pelvic MRI reveals a right sacroiliac joint subchondral edema degenerative versus asymmetric sacroiliitis. question bilateral acetabular labral tear.There are osteophytes off the femoral heads as well left greater than right which would place the patient at risk for impingement.    Lumbar MRI shows diffuse degenerative changes no high-grade central stenosis.  Degenerative changes of the discs are relatively mild to moderate slight retrolisthesis L3 on L4.  There is mild to moderate facet arthropathy L5-S1 L4-5 and mild L3-4.  No significant disc herniation although there is a shallow right foraminal disc protrusion at L2-3.  Severe right and moderate left foraminal stenosis L5-S1.    Review of Systems: Pertinent positives: None.  Pertinent negatives: No numbness, tingling or weakness.  No bowel or bladder incontinence.  No urinary retention.  No fevers, unintentional weight loss, balance changes, headaches, frequent falling, difficulty swallowing, or coordination difficulties.  All others reviewed are negative.          Current Outpatient Medications   Medication Sig Dispense Refill     cetirizine (ZYRTEC) 10 MG tablet Take 1 tablet by mouth daily       COMPOUNDED NON-CONTROLLED SUBSTANCE (CMPD RX) - PHARMACY TO MIX COMPOUNDED MEDICATION Amitriptyline 2%, gabapentin 6%, lidocaine 5% - Apply topically to the vulva, 0.5-1g per application, 3-4x per day as needed. 60 g 2     crisaborole (EUCRISA) 2 % ointment Apply twice daily to eyelid eczema. 60 g 3     Cyanocobalamin (VITAMIN B 12 PO)        diazepam 10 mg SUPP Place vaginally or rectally daily as needed. (Patient not taking: Reported on 10/30/2024) 10 suppository 3     diclofenac (VOLTAREN) 50 MG EC tablet Take 1 tablet (50 mg) by mouth 2 times daily as needed for  moderate pain. 60 tablet 1     CHANCE 0.0375 MG/24HR BIW patch APPLY 1 PATCH TO SKIN TWICE WEEKLY       DULoxetine (CYMBALTA) 60 MG capsule Take 1 capsule (60 mg) by mouth daily. - dose increase 10/3/24 90 capsule 2     DULoxetine (CYMBALTA) 60 MG capsule Take 1 capsule (60 mg) by mouth At Bedtime 90 capsule 2     estradiol (ESTRACE) 0.1 MG/GM vaginal cream APPLY SMALL AMOUNT TO AFFECTED AREA DAILY FOR 2 WEEKS       ketoconazole (NIZORAL) 2 % external shampoo Leave on scalp for 5 minutes the rinse. Use 2-3 times weekly. 120 mL 6     lidocaine-prilocaine (EMLA) 2.5-2.5 % external cream Apply to affected area twice daily PRN pain 30 g 5     Magnesium Citrate 200 MG TABS        metroNIDAZOLE (METROCREAM) 0.75 % external cream Apply twice daily to face. 45 g 5     polyethylene glycol (MIRALAX) 17 g packet        progesterone (PROMETRIUM) 100 MG capsule Take 1 capsule every day by oral route for 30 days.       TYRVAYA 0.03 MG/ACT nasal spray        vitamin D3 (CHOLECALCIFEROL) 2000 units (50 mcg) tablet Take 1 tablet by mouth daily       XIIDRA 5 % opthalmic solution INSTILL 1 DROP INTO EACH EYE TWICE DAILY AS DIRECTED       zinc gluconate 50 MG tablet        No current facility-administered medications for this visit.     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           Objective:   Physical Exam:    /68   Pulse 76   There is no height or weight on file to calculate BMI.      General: Alert and oriented with normal affect. Attention, knowledge, memory, and language are intact. No acute distress.   Eyes: Sclerae are clear.  Respirations: Unlabored. CV: No lower extremity edema.  Skin: No rashes seen.    Gait:  Nonantalgic  Left groin pain with fair test and BAO test mild positive on the right for hip pain.  Tenderness over the right PSIS and SI region.  Sensation is intact to light touch throughout the upper and lower extremities.  Reflexes are 2+ patellar and Achilles      Manual muscle testing reveals:  Right /Left out of 5     5/5 hip flexors  5/5 knee flexors  5/5 knee extensors  5/5 ankle plantar flexors  5/5 ankle dorsiflexors  5/5  EHL        Again, thank you for allowing me to participate in the care of your patient.        Sincerely,        Santhosh Peck, DO

## 2024-11-04 NOTE — PROGRESS NOTES
Assessment/Plan:      Shala was seen today for back pain.    Diagnoses and all orders for this visit:    Hip pain, left  -     PAIN US Large Joint Injection Unilateral; Future    Hip impingement syndrome, left  -     PAIN US Large Joint Injection Unilateral; Future    Osteoarthritis of both hips, unspecified osteoarthritis type  -     PAIN US Large Joint Injection Unilateral; Future    Sacroiliitis (H)    Chronic pelvic pain in female    Foraminal stenosis of lumbar region         Assessment: Pleasant 49 year old female with a history of hypertension, depression, hypothyroidism, history of alcohol use with:     1.  Severe left groin pain greater than right consistent with left hip osteoarthritis.  She has decreased internal and external rotation and positive fair test and BAO test for hip pain I suspect her referred pain to the groin and pelvic region is likely related to hip osteoarthritis.     2.  Lumbar spine pain with right greater than left lower extremity radicular pain in an S1 distribution.  History of this happening in the past as well this current episode for 2 months.new MRI shows relatively mild to moderate degenerative changes about the lumbar spine without any high-grade central canal or foraminal stenosis no compression of the S1 nerves.  MRI of the pelvis shows inflammatory changes of the right SI joint which can be consistent with asymmetric sacroiliitis and this could represent pseudo radiculopathy.  There is also severe right moderate left foraminal stenosis L5-S1 which may contribute to some of the right lower extremity radicular symptoms.   No improvement with physical therapy.         2.  Myofascial pain lumbar spine and gluteal region.     3.  Chronic pelvic pain been diagnosed with potential pudendal neuralgia in the past but this is basically in the left greater than right groin anterior pelvic region as well as in the pelvis itself bilaterally.            Discussion:    1.  We  discussed the diagnosis and treatment options.  We discussed option of SI joint injection for the right-sided low back although the left hip is much greater than right today.  I believe that much of her hip and groin and pelvic pain are related to the hip joint osteoarthritis with labral tearing and cam impingement.    2.  Recommend left hip injection under ultrasound.  This will be ordered.    3.  Will monitor how much improvement she has with the left hip injection and can consider further imaging of the left hip for surgical evaluation.    4.  Follow-up at earliest convenience for injection.    It was our pleasure caring for your patient today, if there any questions or concerns please do not hesitate to contact us.      Subjective:   Patient ID: Shala Morales is a 49 year old female.    History of Present Illness: Patient presents for follow-up of left hip pain more than right and right low back pain greater than left.  Left hip and groin is the most significant pain.  Some improvement with diclofenac but still having severe pain with sitting or walking pain is 7/10 at worst 4/10 today 3/10 at best.  Limited walking heating pad medications do help also still has the right-sided low back pain that it is more bearable.  Subbing loss of sensation of vaginal pain and loss of sensation with her bladder is full.  Still on Cymbalta as well.  MRI of the lumbar spine and pelvis have been reviewed.  Has some right-sided leg pain to the foot as well intermittently.  I reviewed the notes and  performed a left hip injection in September 2021.  Patient reports some of the pain improved but hard for her to remember.    Labs: SSA SSB normal rheumatoid factor normal less than 10, ESR normal at 10, CRP -3.03, CK normal 64 CCP -1.0 and NANCY negative.    Imaging: MRI of the pelvis and lumbar spine images and report personally reviewed for medical decision-making purposes.  Pelvic MRI reveals a right sacroiliac joint  subchondral edema degenerative versus asymmetric sacroiliitis. question bilateral acetabular labral tear.There are osteophytes off the femoral heads as well left greater than right which would place the patient at risk for impingement.    Lumbar MRI shows diffuse degenerative changes no high-grade central stenosis.  Degenerative changes of the discs are relatively mild to moderate slight retrolisthesis L3 on L4.  There is mild to moderate facet arthropathy L5-S1 L4-5 and mild L3-4.  No significant disc herniation although there is a shallow right foraminal disc protrusion at L2-3.  Severe right and moderate left foraminal stenosis L5-S1.    Review of Systems: Pertinent positives: None.  Pertinent negatives: No numbness, tingling or weakness.  No bowel or bladder incontinence.  No urinary retention.  No fevers, unintentional weight loss, balance changes, headaches, frequent falling, difficulty swallowing, or coordination difficulties.  All others reviewed are negative.          Current Outpatient Medications   Medication Sig Dispense Refill    cetirizine (ZYRTEC) 10 MG tablet Take 1 tablet by mouth daily      COMPOUNDED NON-CONTROLLED SUBSTANCE (CMPD RX) - PHARMACY TO MIX COMPOUNDED MEDICATION Amitriptyline 2%, gabapentin 6%, lidocaine 5% - Apply topically to the vulva, 0.5-1g per application, 3-4x per day as needed. 60 g 2    crisaborole (EUCRISA) 2 % ointment Apply twice daily to eyelid eczema. 60 g 3    Cyanocobalamin (VITAMIN B 12 PO)       diazepam 10 mg SUPP Place vaginally or rectally daily as needed. (Patient not taking: Reported on 10/30/2024) 10 suppository 3    diclofenac (VOLTAREN) 50 MG EC tablet Take 1 tablet (50 mg) by mouth 2 times daily as needed for moderate pain. 60 tablet 1    CHANCE 0.0375 MG/24HR BIW patch APPLY 1 PATCH TO SKIN TWICE WEEKLY      DULoxetine (CYMBALTA) 60 MG capsule Take 1 capsule (60 mg) by mouth daily. - dose increase 10/3/24 90 capsule 2    DULoxetine (CYMBALTA) 60 MG capsule  Take 1 capsule (60 mg) by mouth At Bedtime 90 capsule 2    estradiol (ESTRACE) 0.1 MG/GM vaginal cream APPLY SMALL AMOUNT TO AFFECTED AREA DAILY FOR 2 WEEKS      ketoconazole (NIZORAL) 2 % external shampoo Leave on scalp for 5 minutes the rinse. Use 2-3 times weekly. 120 mL 6    lidocaine-prilocaine (EMLA) 2.5-2.5 % external cream Apply to affected area twice daily PRN pain 30 g 5    Magnesium Citrate 200 MG TABS       metroNIDAZOLE (METROCREAM) 0.75 % external cream Apply twice daily to face. 45 g 5    polyethylene glycol (MIRALAX) 17 g packet       progesterone (PROMETRIUM) 100 MG capsule Take 1 capsule every day by oral route for 30 days.      TYRVAYA 0.03 MG/ACT nasal spray       vitamin D3 (CHOLECALCIFEROL) 2000 units (50 mcg) tablet Take 1 tablet by mouth daily      XIIDRA 5 % opthalmic solution INSTILL 1 DROP INTO EACH EYE TWICE DAILY AS DIRECTED      zinc gluconate 50 MG tablet        No current facility-administered medications for this visit.     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           Objective:   Physical Exam:    /68   Pulse 76   There is no height or weight on file to calculate BMI.      General: Alert and oriented with normal affect. Attention, knowledge, memory, and language are intact. No acute distress.    Eyes: Sclerae are clear.  Respirations: Unlabored. CV: No lower extremity edema.  Skin: No rashes seen.    Gait:  Nonantalgic  Left groin pain with fair test and BAO test mild positive on the right for hip pain.  Tenderness over the right PSIS and SI region.  Sensation is intact to light touch throughout the upper and lower extremities.  Reflexes are 2+ patellar and Achilles      Manual muscle testing reveals:  Right /Left out of 5     5/5 hip flexors  5/5 knee flexors  5/5 knee extensors  5/5 ankle plantar flexors  5/5 ankle dorsiflexors  5/5  EHL

## 2024-11-04 NOTE — PATIENT INSTRUCTIONS
A Left hip injection has been ordered today. Please schedule this injection at least 2 weeks from now to allow time for insurance prior authorization. On the day of your injection, you cannot be sick or taking antibiotics. If you become sick and are prescribed, please call the clinic so your injection can be rescheduled for once you have completed your antibiotics.  It is recommended that you do not have a vaccination within 2 weeks of your procedure if it will contain steroids, as this may make the vaccination less effective.  You will need to bring a  with you for your injection. If you have any questions or concerns prior to your injection, please do not hesitate to call the nurse navigation line at [phone].

## 2024-11-05 ENCOUNTER — THERAPY VISIT (OUTPATIENT)
Dept: PHYSICAL THERAPY | Facility: CLINIC | Age: 49
End: 2024-11-05
Payer: COMMERCIAL

## 2024-11-05 DIAGNOSIS — M16.0 OSTEOARTHRITIS OF BOTH HIPS, UNSPECIFIED OSTEOARTHRITIS TYPE: ICD-10-CM

## 2024-11-05 DIAGNOSIS — M25.552 HIP PAIN, LEFT: Primary | ICD-10-CM

## 2024-11-05 DIAGNOSIS — R10.2 PELVIC PAIN IN FEMALE: Primary | ICD-10-CM

## 2024-11-05 DIAGNOSIS — M25.852 HIP IMPINGEMENT SYNDROME, LEFT: ICD-10-CM

## 2024-11-05 PROCEDURE — 97161 PT EVAL LOW COMPLEX 20 MIN: CPT | Mod: GP | Performed by: PHYSICAL THERAPIST

## 2024-11-05 PROCEDURE — 97530 THERAPEUTIC ACTIVITIES: CPT | Mod: GP | Performed by: PHYSICAL THERAPIST

## 2024-11-05 PROCEDURE — 97140 MANUAL THERAPY 1/> REGIONS: CPT | Mod: GP | Performed by: PHYSICAL THERAPIST

## 2024-11-05 PROCEDURE — 97110 THERAPEUTIC EXERCISES: CPT | Mod: GP | Performed by: PHYSICAL THERAPIST

## 2024-11-05 NOTE — PROGRESS NOTES
PHYSICAL THERAPY EVALUATION  Type of Visit: Evaluation        Fall Risk Screen:  Fall screen completed by: PT  Have you fallen 2 or more times in the past year?: No  Have you fallen and had an injury in the past year?: No  Is patient a fall risk?: No    Subjective         Presenting condition or subjective complaint: (Patient-Rptd) Vulvadynia symptoms with burning at vestibule and  urethra, bladder pain, deep vaginal pain, groin pain  Date of onset: 05/05/24    Relevant medical history: (Patient-Rptd) Bladder or bowel problems; Change in skin color; Concussions; Depression; High blood pressure; Menopause; Overweight; Pain at night or rest; Substance use disorder; Thyroid problems   Dates & types of surgery: (Patient-Rptd) None    Prior diagnostic imaging/testing results: (Patient-Rptd) MRI; CT scan; X-ray; EMG     Prior therapy history for the same diagnosis, illness or injury: (Patient-Rptd) Yes (Patient-Rptd) 2021 pelvic  at [facility]  Social support: (Patient-Rptd) With a significant other or spouse   Type of home: (Patient-Rptd) House   Stairs to enter the home: (Patient-Rptd) Yes (Patient-Rptd) 2 Is there a railing: (Patient-Rptd) Yes     Ramp: (Patient-Rptd) No   Stairs inside the home: (Patient-Rptd) Yes (Patient-Rptd) 12 Is there a railing: (Patient-Rptd) Yes     Help at home: (Patient-Rptd) None  Equipment owned:       Employment: (Patient-Rptd) Yes (Patient-Rptd) LPN  Hobbies/Interests: (Patient-Rptd) Gardening, outdoor walking, movie lover, time with family, working 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 Pt reports L hip going to have an MRI due to possible pain into the groin.      Objective      PELVIC EVALUATION  ADDITIONAL HISTORY:  Sex  assigned at birth: (Patient-Rptd) Female  Gender identity: (Patient-Rptd) Female    Pronouns: (Patient-Rptd) She/Her Hers      Bladder History:  Feels bladder filling: (Patient-Rptd) No  Triggers for feeling of inability to wait to go to the bathroom: (Patient-Rptd) Yes (Patient-Rptd) Sitting, walking at times  How long can you wait to urinate: (Patient-Rptd) 30 minutes  Gets up at night to urinate: (Patient-Rptd) No    Can stop the flow of urine when urinating: (Patient-Rptd) Yes  Volume of urine usually released: (Patient-Rptd) Medium   Other issues: (Patient-Rptd) Slow or hesitant urine stream; Trouble emptying bladder completely  Number of bladder infections in last 12 months:    Fluid intake per day: (Patient-Rptd) 60 ounces (Patient-Rptd) 16 ounces    Medications taken for bladder: (Patient-Rptd) No     Activities causing urine leak: (Patient-Rptd) Jump; Run; Other activities (Patient-Rptd) Walking  Amount of urine typically leaked: (Patient-Rptd) Enought to saturate a panty liner  Pads used to help with leaking: (Patient-Rptd) No        Bowel History:  Frequency of bowel movement: (Patient-Rptd) Fluctuates- on several stool meds to help. Every 2-3 days  Consistency of stool: (Patient-Rptd) Other (Patient-Rptd) Can be all consistency above, variable  Ignores the urge to defecate: (Patient-Rptd) Sometimes  Other bowel issues: (Patient-Rptd) Pain when pooping; Straining to have bowel movement  Length of time spent trying to have a bowel movement: (Patient-Rptd) 15 minutes    Sexual Function History:  Sexual orientation: (Patient-Rptd) Straight    Sexually active: (Patient-Rptd) No  Lubrication used: (Patient-Rptd) Yes (Patient-Rptd) Yes  Pelvic pain: (Patient-Rptd) Walking; Standing; Sitting; Certain positions; Initial penetration (rectal or vaginal); Deep penetration (rectal or vaginal) (Patient-Rptd) Worse with sitting but at times aggravation of sx on left side with walking, hard to find side lying and  sleeping posions. Not having sex due to vestibular pain and new onset of pain.  recovering from prostate removal/unable to have an erection  Pain or difficulty with orgasms/erection/ejaculation: (Patient-Rptd) No    State of menopause: (Patient-Rptd) Perimenopause (have not gone through menopause yet)  Hormone medications: (Patient-Rptd) Yes (Patient-Rptd) Vaginal estrogen 1gm 2 x a week,  estradiol 0.025% transdermal patch 2x week , progesterone 100mg nightly    Are you currently pregnant: (Patient-Rptd) No  Number of previous pregnancies: (Patient-Rptd) Zero  Number of deliveries: (Patient-Rptd) Zero  Have you been diagnosed with pelvic prolapse or abdominal separation: (Patient-Rptd) No  Do you get regular exercise: (Patient-Rptd) Yes  I do this type of exercise: (Patient-Rptd) Walking  Have you tried pelvic floor strengthening exercises for 4 weeks: (Patient-Rptd) No  Do you have any history of trauma that is relevant to your care that you d like to share: (Patient-Rptd) No      Discussed reason for referral regarding pelvic health needs and external/internal pelvic floor muscle examination with patient/guardian.  Opportunity provided to ask questions and verbal consent for assessment and intervention was given.    PAIN: see above  POSTURE: good posture  LUMBAR SCREEN: nil  loss flexion or extension.  HIP SCREEN:  ROM- 50% loss with L ER, full hip R ER. Full flexion B, firm end feel and 10* hip extension B.   Palpation: B psoas and illiacus trigger points L>R    PELVIC EXAM  External Digital Palpation per Perineum:   Ischiocavernosis: Unremarkable  Bulbo cavernosis: Unremarkable  Transverse perineal: Unremarkable  Perineal body: Unremarkable    Internal Digital Palpation:  Per Vagina:  + tightness B obturator internus L>R with palpable trigger point.      Assessment & Plan   CLINICAL IMPRESSIONS  Medical Diagnosis: Pelvic Pain, Stress Incontinence    Treatment Diagnosis: Pelvic Pain, Stress Incontinence    Impression/Assessment: Patient is a 49 year old female with Pelvic Pain, L hip pain>R complaints.  The following significant findings have been identified: Pain, Decreased ROM/flexibility, Decreased joint mobility, Impaired muscle performance, and Decreased activity tolerance. These impairments interfere with their ability to perform self care tasks, recreational activities, household chores, household mobility, and community mobility as compared to previous level of function.     Clinical Decision Making (Complexity):  Clinical Presentation: Stable/Uncomplicated  Clinical Presentation Rationale: based on medical and personal factors listed in PT evaluation  Clinical Decision Making (Complexity): Low complexity    PLAN OF CARE  Treatment Interventions:  Interventions: Manual Therapy, Neuromuscular Re-education, Therapeutic Activity, Therapeutic Exercise    Long Term Goals     PT Goal 1  Goal Identifier: Sitting  Goal Description: pt will be able to sit >30 minutes without increased pelvic pain  Rationale: to maximize safety and independence with performance of ADLs and functional tasks  Target Date: 01/28/25      Frequency of Treatment: 1x per week  Duration of Treatment: 12 weeks    Recommended Referrals to Other Professionals:   Education Assessment:   Learner/Method: Patient;No Barriers to Learning;Pictures/Video    Risks and benefits of evaluation/treatment have been explained.   Patient/Family/caregiver agrees with Plan of Care.     Evaluation Time:     PT Eval, Low Complexity Minutes (40398): 30       Signing Clinician: Alma Hess PT

## 2024-11-07 NOTE — TELEPHONE ENCOUNTER
Received fax from [employer] [facility] appeals department for the Eucrisa Ointment 2% requesting further information.     Spoke with patient who states she has already picked up her prescription and nothing further is needed at this time.     Nel DHALIWAL RN BSN  [facility]  [phone]

## 2024-11-11 ENCOUNTER — PATIENT OUTREACH (OUTPATIENT)
Dept: CARE COORDINATION | Facility: CLINIC | Age: 49
End: 2024-11-11
Payer: COMMERCIAL

## 2024-11-16 ENCOUNTER — HOSPITAL ENCOUNTER (OUTPATIENT)
Dept: MRI IMAGING | Facility: CLINIC | Age: 49
Discharge: HOME | End: 2024-11-16
Attending: PHYSICAL MEDICINE & REHABILITATION | Admitting: PHYSICAL MEDICINE & REHABILITATION
Payer: COMMERCIAL

## 2024-11-16 DIAGNOSIS — M25.552 HIP PAIN, LEFT: ICD-10-CM

## 2024-11-16 DIAGNOSIS — M25.852 HIP IMPINGEMENT SYNDROME, LEFT: ICD-10-CM

## 2024-11-16 DIAGNOSIS — M16.0 OSTEOARTHRITIS OF BOTH HIPS, UNSPECIFIED OSTEOARTHRITIS TYPE: ICD-10-CM

## 2024-11-16 PROCEDURE — 73721 MRI JNT OF LWR EXTRE W/O DYE: CPT | Mod: 26 | Performed by: RADIOLOGY

## 2024-11-16 PROCEDURE — 73721 MRI JNT OF LWR EXTRE W/O DYE: CPT | Mod: LT

## 2024-11-19 ENCOUNTER — THERAPY VISIT (OUTPATIENT)
Dept: PHYSICAL THERAPY | Facility: CLINIC | Age: 49
End: 2024-11-19
Payer: COMMERCIAL

## 2024-11-19 DIAGNOSIS — R10.2 PELVIC PAIN IN FEMALE: Primary | ICD-10-CM

## 2024-11-19 PROCEDURE — 97530 THERAPEUTIC ACTIVITIES: CPT | Mod: GP | Performed by: PHYSICAL THERAPIST

## 2024-11-19 PROCEDURE — 97140 MANUAL THERAPY 1/> REGIONS: CPT | Mod: GP | Performed by: PHYSICAL THERAPIST

## 2024-11-19 PROCEDURE — 97110 THERAPEUTIC EXERCISES: CPT | Mod: GP | Performed by: PHYSICAL THERAPIST

## 2024-11-20 ENCOUNTER — ANCILLARY PROCEDURE (OUTPATIENT)
Dept: MAMMOGRAPHY | Facility: CLINIC | Age: 49
End: 2024-11-20
Attending: PHYSICIAN ASSISTANT
Payer: COMMERCIAL

## 2024-11-20 DIAGNOSIS — Z12.31 VISIT FOR SCREENING MAMMOGRAM: ICD-10-CM

## 2024-11-25 ENCOUNTER — RADIOLOGY INJECTION OFFICE VISIT (OUTPATIENT)
Dept: PHYSICAL MEDICINE AND REHAB | Facility: CLINIC | Age: 49
End: 2024-11-25
Attending: PHYSICAL MEDICINE & REHABILITATION
Payer: COMMERCIAL

## 2024-11-25 ENCOUNTER — ANCILLARY PROCEDURE (OUTPATIENT)
Dept: MRI IMAGING | Facility: CLINIC | Age: 49
End: 2024-11-25
Attending: PSYCHIATRY & NEUROLOGY
Payer: COMMERCIAL

## 2024-11-25 VITALS
TEMPERATURE: 98.1 F | WEIGHT: 200 LBS | SYSTOLIC BLOOD PRESSURE: 120 MMHG | OXYGEN SATURATION: 98 % | HEIGHT: 69 IN | HEART RATE: 93 BPM | DIASTOLIC BLOOD PRESSURE: 70 MMHG | BODY MASS INDEX: 29.62 KG/M2

## 2024-11-25 DIAGNOSIS — M25.552 HIP PAIN, LEFT: ICD-10-CM

## 2024-11-25 DIAGNOSIS — M16.0 OSTEOARTHRITIS OF BOTH HIPS, UNSPECIFIED OSTEOARTHRITIS TYPE: ICD-10-CM

## 2024-11-25 DIAGNOSIS — M25.852 HIP IMPINGEMENT SYNDROME, LEFT: ICD-10-CM

## 2024-11-25 DIAGNOSIS — R20.2 PARESTHESIAS: ICD-10-CM

## 2024-11-25 PROCEDURE — 70553 MRI BRAIN STEM W/O & W/DYE: CPT | Mod: TC | Performed by: RADIOLOGY

## 2024-11-25 PROCEDURE — A9585 GADOBUTROL INJECTION: HCPCS | Performed by: RADIOLOGY

## 2024-11-25 RX ORDER — ROPIVACAINE HYDROCHLORIDE 5 MG/ML
INJECTION, SOLUTION EPIDURAL; INFILTRATION; PERINEURAL
Status: COMPLETED | OUTPATIENT
Start: 2024-11-25 | End: 2024-11-25

## 2024-11-25 RX ORDER — GADOBUTROL 604.72 MG/ML
9.5 INJECTION INTRAVENOUS ONCE
Status: COMPLETED | OUTPATIENT
Start: 2024-11-25 | End: 2024-11-25

## 2024-11-25 RX ORDER — TRIAMCINOLONE ACETONIDE 40 MG/ML
INJECTION, SUSPENSION INTRA-ARTICULAR; INTRAMUSCULAR
Status: COMPLETED | OUTPATIENT
Start: 2024-11-25 | End: 2024-11-25

## 2024-11-25 RX ORDER — LIDOCAINE HYDROCHLORIDE 10 MG/ML
INJECTION, SOLUTION EPIDURAL; INFILTRATION; INTRACAUDAL; PERINEURAL
Status: COMPLETED | OUTPATIENT
Start: 2024-11-25 | End: 2024-11-25

## 2024-11-25 RX ADMIN — GADOBUTROL 9.5 ML: 604.72 INJECTION INTRAVENOUS at 12:17

## 2024-11-25 RX ADMIN — LIDOCAINE HYDROCHLORIDE 2 ML: 10 INJECTION, SOLUTION EPIDURAL; INFILTRATION; INTRACAUDAL; PERINEURAL at 15:19

## 2024-11-25 RX ADMIN — ROPIVACAINE HYDROCHLORIDE 4 ML: 5 INJECTION, SOLUTION EPIDURAL; INFILTRATION; PERINEURAL at 15:22

## 2024-11-25 RX ADMIN — TRIAMCINOLONE ACETONIDE 40 MG: 40 INJECTION, SUSPENSION INTRA-ARTICULAR; INTRAMUSCULAR at 15:21

## 2024-11-25 ASSESSMENT — PAIN SCALES - GENERAL: PAINLEVEL_OUTOF10: MODERATE PAIN (4)

## 2024-11-25 NOTE — PATIENT INSTRUCTIONS
DISCHARGE INSTRUCTIONS    During office hours (8:00 a.m.- 4:00 p.m.) questions or concerns may be answered  by calling [facility] Center Navigation Nurses at  [phone].  Messages received after hours will be returned the following business day.      In the case of an emergency, please dial 911 or seek assistance at the nearest Emergency Room/Urgent Care facility.     All Patients:    You may experience an increase in your symptoms for the first 2 days (It may take anywhere between 2 days- 2 weeks for the steroid to have maximum effect).    You may use ice on the injection site, as frequently as 20 minutes each hour if needed.    You may take your pain medicine.    You may continue taking your regular medication after your injection. If you have had a Medial Branch Block you may resume pain medication once your pain diary is completed.    You may shower. No swimming, tub bath or hot tub for 48 hours.  You may remove your bandaid/bandage as soon as you are home.    You may resume light activities, as tolerated.    Resume your usual diet as tolerated.    If you were told to hold any blood thinning medications you may resume taking them 24 hours after your procedure as prescribed.    It is strongly advised that you do not drive for 1-3 hours post injection.    If you have had oral sedation:  Do not drive for 8 hours post injection.      If you have had IV sedation:  Do not drive for 24 hours post injection.  Do not operate hazardous machinery or make important personal/business decisions for 24 hours.      POSSIBLE STEROID SIDE EFFECTS (If steroid/cortisone was used for your procedure)    -If you experience these symptoms, it should only last for a short period    Swelling of the legs              Skin redness (flushing)     Mouth (oral) irritation   Blood sugar (glucose) levels            Sweats                    Mood changes  Headache  Sleeplessness  Weakened immune system for up to 14 days, which could increase  the risk of leesa the COVID-19 virus and/or experiencing more severe symptoms of the disease, if exposed.  Decreased effectiveness of the flu vaccine if given within 2 weeks of the steroid.         POSSIBLE PROCEDURE SIDE EFFECTS  -Call the Spine Center if you are concerned  Increased Pain           Increased numbness/tingling      Nausea/Vomiting          Bruising/bleeding at site      Redness or swelling                                              Difficulty walking      Weakness           Fever greater than 100.5    *In the event of a severe headache after an epidural steroid injection that is relieved by lying down, please call the [facility] to speak with a clinical staff member*

## 2024-12-03 ENCOUNTER — THERAPY VISIT (OUTPATIENT)
Dept: PHYSICAL THERAPY | Facility: CLINIC | Age: 49
End: 2024-12-03
Payer: COMMERCIAL

## 2024-12-03 DIAGNOSIS — R10.2 PELVIC PAIN IN FEMALE: Primary | ICD-10-CM

## 2024-12-03 PROCEDURE — 97530 THERAPEUTIC ACTIVITIES: CPT | Mod: GP | Performed by: PHYSICAL THERAPIST

## 2024-12-03 PROCEDURE — 97140 MANUAL THERAPY 1/> REGIONS: CPT | Mod: GP | Performed by: PHYSICAL THERAPIST

## 2024-12-11 ENCOUNTER — THERAPY VISIT (OUTPATIENT)
Dept: PHYSICAL THERAPY | Facility: CLINIC | Age: 49
End: 2024-12-11
Payer: COMMERCIAL

## 2024-12-11 DIAGNOSIS — R10.2 PELVIC PAIN IN FEMALE: Primary | ICD-10-CM

## 2024-12-11 PROCEDURE — 97140 MANUAL THERAPY 1/> REGIONS: CPT | Mod: GP | Performed by: PHYSICAL THERAPIST

## 2024-12-11 PROCEDURE — 97110 THERAPEUTIC EXERCISES: CPT | Mod: GP | Performed by: PHYSICAL THERAPIST

## 2024-12-17 ENCOUNTER — THERAPY VISIT (OUTPATIENT)
Dept: PHYSICAL THERAPY | Facility: CLINIC | Age: 49
End: 2024-12-17
Payer: COMMERCIAL

## 2024-12-17 DIAGNOSIS — R10.2 PELVIC PAIN IN FEMALE: Primary | ICD-10-CM

## 2024-12-17 PROCEDURE — 97140 MANUAL THERAPY 1/> REGIONS: CPT | Mod: GP | Performed by: PHYSICAL THERAPIST

## 2024-12-17 PROCEDURE — 97110 THERAPEUTIC EXERCISES: CPT | Mod: GP | Performed by: PHYSICAL THERAPIST

## 2024-12-22 ENCOUNTER — MYC MEDICAL ADVICE (OUTPATIENT)
Dept: DERMATOLOGY | Facility: CLINIC | Age: 49
End: 2024-12-22
Payer: COMMERCIAL

## 2024-12-22 DIAGNOSIS — L71.9 ACNE ROSACEA: ICD-10-CM

## 2024-12-22 DIAGNOSIS — L21.9 DERMATITIS, SEBORRHEIC: Primary | ICD-10-CM

## 2024-12-23 RX ORDER — KETOCONAZOLE 20 MG/G
CREAM TOPICAL
Qty: 30 G | Refills: 4 | Status: SHIPPED | OUTPATIENT
Start: 2024-12-23

## 2024-12-26 ENCOUNTER — THERAPY VISIT (OUTPATIENT)
Dept: PHYSICAL THERAPY | Facility: CLINIC | Age: 49
End: 2024-12-26
Payer: COMMERCIAL

## 2024-12-26 DIAGNOSIS — R10.2 PELVIC PAIN IN FEMALE: Primary | ICD-10-CM

## 2024-12-29 DIAGNOSIS — R10.2 CHRONIC PELVIC PAIN IN FEMALE: ICD-10-CM

## 2024-12-29 DIAGNOSIS — M54.16 LUMBAR RADICULAR PAIN: ICD-10-CM

## 2024-12-29 DIAGNOSIS — G89.29 CHRONIC PELVIC PAIN IN FEMALE: ICD-10-CM

## 2024-12-31 ENCOUNTER — VIRTUAL VISIT (OUTPATIENT)
Dept: NEUROLOGY | Facility: CLINIC | Age: 49
End: 2024-12-31
Payer: COMMERCIAL

## 2024-12-31 VITALS — WEIGHT: 204 LBS | BODY MASS INDEX: 30.13 KG/M2

## 2024-12-31 DIAGNOSIS — R20.2 PARESTHESIAS: Primary | ICD-10-CM

## 2024-12-31 PROCEDURE — 99213 OFFICE O/P EST LOW 20 MIN: CPT | Mod: 95 | Performed by: PSYCHIATRY & NEUROLOGY

## 2024-12-31 ASSESSMENT — PAIN SCALES - GENERAL: PAINLEVEL_OUTOF10: MODERATE PAIN (4)

## 2024-12-31 NOTE — NURSING NOTE
Current patient location: [address] [facility] [address]    Is the patient currently in the state of [place]? YES    Visit mode:VIDEO    If the visit is dropped, the patient can be reconnected by:VIDEO VISIT: Text to cell phone:   Telephone Information:   Mobile [phone]       Will anyone else be joining the visit? NO  (If patient encounters technical issues they should call [phone])    Are changes needed to the allergy or medication list? No    Are refills needed on medications prescribed by this physician? NO    Rooming Documentation:  Questionnaire(s) not pre-assigned    Reason for visit: Follow Up    Екатерина PRADO

## 2024-12-31 NOTE — PROGRESS NOTES
[facility] Neurology Follow Up Visit    Shala Morales MRN# [id number]   Age: 49 year old YOB: 1975     Brief history of symptoms: The patient was initially seen in neurologic consultation on 4/9/2021 and most recently 10/24/2023 for evaluation of pelvic pain. Please see the comprehensive neurologic consultation notes from those dates in the Epic records for details.     Prior evaluations which were pertinent:  - Normal EMG 7/13/2021  - MRI lumbar spine 5/1/2021 with some mild neuroforaminal narrowing and spondylosis at L3-S1.  - Serum studies were normal 6/15/2021  - Seen with pain management 2/9/2022 where her symptoms were described as deep vaginal pain, left lower quadrants pain to perineal and rectal pain.  She was to consider ganglion impar block, and see a pain psychologist.   - Seen with sports medicine 2/14/2023 for suspected right sided sciatica and right lower back pain.  She was to utilize PT, and ibuprofen.  - MRI brain, cervical spine, thoracic spine 9/17/2023 were normal by most measures. There was some mild microvascular changes of the brain (supratentorial subcortical bifrontal, right subinsular) w/out enhancement.  There was a finding of prominent CSF filled subarachnoid space of the left intraorbital optic nerve.  There was no lesion or anatomical abnormality of the cervical or thoracic spine.     At our last visit, the patient was recommended to repeat her MRI in one year to better define the suspected microvascular ischemic changes.  Otherwise, she was to continue to manage her pain through her PCP by means of duloxetine treatment.    Interval history:    - MRI brain 11/25/2024 was unchanged compared to 9/17/2023.    Today, the patient did try to taper Cymbalta but her pain of the pelvis did return to some degree.  She has since returned to using the medication with some benefit.  There are no new neurological symptoms to report overall.      Physical Exam:   General: Seated  comfortably in no acute distress.  Neurologic:     Mental Status: Fully alert, attentive and oriented. Speech clear and fluent, no paraphasic errors.     Cranial Nerves: EOMI with normal smooth pursuit. Facial movements symmetric. Hearing not formally tested but intact to conversation.  No dysarthria.     Motor: No tremors or other abnormal movements observed.          Assessment and Plan:   Assessment:  Subcortical white matter change, suspect microvascular disease    The patient's imaging is reassuringly without change in size of her old lesion, and w/out new lesions being present.  I don't know if I would really classify this is as radiographic isolated syndrome or clinically isolated syndrome, but instead would think this individual white matter finding may better be detailed as microvascular ischemic change.  We discussed that the she should contact me if new symptoms emerged, but at this time I wouldn't recommend serial imaging to monitor the lesion.     Plan:  Follow up in Neurology clinic as needed should new concerns arise.    DONATO Zhou D.O.   of Neurology    Total time today (28 min) in this patient encounter was spent on pre-charting, counseling and/or coordination of care.

## 2024-12-31 NOTE — LETTER
12/31/2024       RE: Shala Morales  [address]  [facility] [address]     Dear Colleague,    Thank you for referring your patient, Shala Morales, to the [facility] NEUROLOGY CLINIC [place] at [facility]. Please see a copy of my visit note below.    [facility] Neurology Follow Up Visit    Shala Morales MRN# [id number]   Age: 49 year old YOB: 1975     Brief history of symptoms: The patient was initially seen in neurologic consultation on 4/9/2021 and most recently 10/24/2023 for evaluation of pelvic pain. Please see the comprehensive neurologic consultation notes from those dates in the Epic records for details.     Prior evaluations which were pertinent:  - Normal EMG 7/13/2021  - MRI lumbar spine 5/1/2021 with some mild neuroforaminal narrowing and spondylosis at L3-S1.  - Serum studies were normal 6/15/2021  - Seen with pain management 2/9/2022 where her symptoms were described as deep vaginal pain, left lower quadrants pain to perineal and rectal pain.  She was to consider ganglion impar block, and see a pain psychologist.   - Seen with sports medicine 2/14/2023 for suspected right sided sciatica and right lower back pain.  She was to utilize PT, and ibuprofen.  - MRI brain, cervical spine, thoracic spine 9/17/2023 were normal by most measures. There was some mild microvascular changes of the brain (supratentorial subcortical bifrontal, right subinsular) w/out enhancement.  There was a finding of prominent CSF filled subarachnoid space of the left intraorbital optic nerve.  There was no lesion or anatomical abnormality of the cervical or thoracic spine.     At our last visit, the patient was recommended to repeat her MRI in one year to better define the suspected microvascular ischemic changes.  Otherwise, she was to continue to manage her pain through her PCP by means of duloxetine treatment.    Interval history:    - MRI brain  11/25/2024 was unchanged compared to 9/17/2023.    Today, the patient did try to taper Cymbalta but her pain of the pelvis did return to some degree.  She has since returned to using the medication with some benefit.  There are no new neurological symptoms to report overall.      Physical Exam:   General: Seated comfortably in no acute distress.  Neurologic:     Mental Status: Fully alert, attentive and oriented. Speech clear and fluent, no paraphasic errors.     Cranial Nerves: EOMI with normal smooth pursuit. Facial movements symmetric. Hearing not formally tested but intact to conversation.  No dysarthria.     Motor: No tremors or other abnormal movements observed.          Assessment and Plan:   Assessment:  Subcortical white matter change, suspect microvascular disease    The patient's imaging is reassuringly without change in size of her old lesion, and w/out new lesions being present.  I don't know if I would really classify this is as radiographic isolated syndrome or clinically isolated syndrome, but instead would think this individual white matter finding may better be detailed as microvascular ischemic change.  We discussed that the she should contact me if new symptoms emerged, but at this time I wouldn't recommend serial imaging to monitor the lesion.     Plan:  Follow up in Neurology clinic as needed should new concerns arise.    DONATO Zhou D.O.   of Neurology    Total time today (28 min) in this patient encounter was spent on pre-charting, counseling and/or coordination of care.       Virtual Visit Details    Type of service:  Video Visit     Originating Location (pt. Location): Home    Distant Location (provider location):  On-site  Platform used for Video Visit: AmWell      Again, thank you for allowing me to participate in the care of your patient.      Sincerely,    Stefan Zhou, DO

## 2025-01-06 ENCOUNTER — OFFICE VISIT (OUTPATIENT)
Dept: PHYSICAL MEDICINE AND REHAB | Facility: CLINIC | Age: 50
End: 2025-01-06
Payer: COMMERCIAL

## 2025-01-06 VITALS — HEART RATE: 78 BPM | DIASTOLIC BLOOD PRESSURE: 52 MMHG | SYSTOLIC BLOOD PRESSURE: 135 MMHG

## 2025-01-06 DIAGNOSIS — M25.552 HIP PAIN, LEFT: Primary | ICD-10-CM

## 2025-01-06 DIAGNOSIS — M48.061 FORAMINAL STENOSIS OF LUMBAR REGION: ICD-10-CM

## 2025-01-06 DIAGNOSIS — M54.16 LUMBAR RADICULAR PAIN: ICD-10-CM

## 2025-01-06 DIAGNOSIS — M16.0 OSTEOARTHRITIS OF BOTH HIPS, UNSPECIFIED OSTEOARTHRITIS TYPE: ICD-10-CM

## 2025-01-06 DIAGNOSIS — M25.852 HIP IMPINGEMENT SYNDROME, LEFT: ICD-10-CM

## 2025-01-06 DIAGNOSIS — M46.1 SACROILIITIS: ICD-10-CM

## 2025-01-06 PROCEDURE — 99214 OFFICE O/P EST MOD 30 MIN: CPT | Performed by: PHYSICAL MEDICINE & REHABILITATION

## 2025-01-06 ASSESSMENT — PAIN SCALES - GENERAL: PAINLEVEL_OUTOF10: MODERATE PAIN (5)

## 2025-01-06 NOTE — PROGRESS NOTES
Assessment/Plan:      Shala was seen today for hip pain.    Diagnoses and all orders for this visit:    Hip pain, left  -     Orthopedic  Referral; Future    Hip impingement syndrome, left  -     Orthopedic  Referral; Future    Sacroiliitis (H)    Osteoarthritis of both hips, unspecified osteoarthritis type  -     Orthopedic  Referral; Future    Lumbar radicular pain    Foraminal stenosis of lumbar region         Assessment: Pleasant 49 year old female with a history of hypertension, depression, hypothyroidism, history of alcohol use with:     1.  Severe left groin pain greater than right consistent with left hip osteoarthritis.  Has some right hip decreased range of motion as well.  She has decreased internal and external rotation and positive fair test and BAO test for hip pain I suspect her referred pain to the groin and pelvic region is likely related to hip osteoarthritis and there is likely tearing of the anterior superior portion of the labrum.  75 to 100% improved with deep hip pain following left hip injection under ultrasound.  Still having some right sided iliac crest pain some right sided hip pain as well and decreased range of motion.  Working with pelvic floor therapy.     2.  Lumbar spine pain with right greater than left lower extremity radicular pain in an S1 distribution.  History of this happening in the past as well this current episode for a few months.  New MRI shows relatively mild to moderate degenerative changes about the lumbar spine without any high-grade central canal or foraminal stenosis no compression of the S1 nerves.  MRI of the pelvis shows inflammatory changes of the right SI joint which can be consistent with asymmetric sacroiliitis and this could represent pseudo radiculopathy.  There is also severe right moderate left foraminal stenosis L5-S1 which may contribute to some of the right lower extremity radicular symptoms.   No improvement with  physical therapy.         2.  Myofascial pain lumbar spine and gluteal region.     3.  Chronic pelvic pain been diagnosed with potential pudendal neuralgia in the past but this is basically in the left greater than right groin anterior pelvic region as well as in the pelvis itself bilaterally.            Discussion:    1.  We discussed the diagnosis and treatment options.  We discussed options of further injection such as SI joint along with orthopedic evaluation medications and continued home exercise.    2.   Recommend orthopedic evaluation and to discuss potential arthroscopic surgery for labral tear and hip impingement versus hip replacement or other surgical options.  She would like to stay close to home near [place] will refer to [facility] orthopedics in [facility] Doctor Joao.    3.  Continue with pelvic floor PT.    4.  I did provide some diclofenac for her.  We discussed option of changing this to meloxicam once a day but she will try taking diclofenac to see if it is helpful and if not we will prescribe meloxicam trial.    5.  Follow-up with me as needed after seeing orthopedics.    It was our pleasure caring for your patient today, if there any questions or concerns please do not hesitate to contact us.    Over 33 minutes were spent on the date of the encounter performing chart review, patient visit and documentation in addition to any procedure.    Subjective:   Patient ID: Shala Morales is a 49 year old female.    History of Present Illness: Patient presents for follow-up of left hip pain after left hip injection.  Some improvement right away with local anesthetic and then slow improvement up to 75 to 100% improved in the deep hip pain resolving some left groin pain and very tender on the hip flexors pubic symphysis working with pelvic floor therapy which is very helpful working on hip stretches but still the Kwesi test stretch is very painful bilaterally having some right-sided low back pain  at the PSIS along the iliac crest as well.  Pain is 7/10 at worst 5/10 today 2/10 at best.  Having some right lateral knee pain and ankle pain intermittently as well.  Her pain in general is worse with not movement or prolonged sitting better with heat stretches and gentle exercise.  Still on Cymbalta 60 mg daily underwent the next steps are.  Has not specifically tried diclofenac as of yet.      Imaging: MRI of the left hip from November reviewed showing no full-thickness cartilage loss mild tendinopathy of the gluteus minimus and gluteus medius tendon there is spurring of the femoral head neck junction.  Likely tearing of the anterior superior labrum.  I reviewed MRI of the pelvis as well showing some right sacroiliitis/inflammatory changes along with left greater than right osteoarthritis and hip spurring.    Lumbar MRI reveals moderate right foraminal stenosis L5-S1 no high-grade central stenosis or other foraminal stenosis.      Review of Systems: Complains of weakness.  Denies numbness tingling bowel or bladder incontinence, headaches, swallowing issues fevers or unintentional weight loss.             Current Outpatient Medications   Medication Sig Dispense Refill    cetirizine (ZYRTEC) 10 MG tablet Take 1 tablet by mouth daily      COMPOUNDED NON-CONTROLLED SUBSTANCE (CMPD RX) - PHARMACY TO MIX COMPOUNDED MEDICATION Amitriptyline 2%, gabapentin 6%, lidocaine 5% - Apply topically to the vulva, 0.5-1g per application, 3-4x per day as needed. 60 g 2    crisaborole (EUCRISA) 2 % ointment Apply twice daily to eyelid eczema. 60 g 3    Cyanocobalamin (VITAMIN B 12 PO)       diazepam 10 mg SUPP Place vaginally or rectally daily as needed. (Patient not taking: Reported on 10/30/2024) 10 suppository 3    diclofenac (VOLTAREN) 50 MG EC tablet TAKE 1 TABLET (50 MG) BY MOUTH 2 TIMES DAILY AS NEEDED FOR MODERATE PAIN. 60 tablet 1    CHANCE 0.0375 MG/24HR BIW patch APPLY 1 PATCH TO SKIN TWICE WEEKLY      DULoxetine  (CYMBALTA) 60 MG capsule Take 1 capsule (60 mg) by mouth daily. - dose increase 10/3/24 90 capsule 2    DULoxetine (CYMBALTA) 60 MG capsule Take 1 capsule (60 mg) by mouth At Bedtime 90 capsule 2    estradiol (ESTRACE) 0.1 MG/GM vaginal cream APPLY SMALL AMOUNT TO AFFECTED AREA DAILY FOR 2 WEEKS      ketoconazole (NIZORAL) 2 % external cream Apply once to twice daily to eyebrows/eyelids. 30 g 4    ketoconazole (NIZORAL) 2 % external shampoo Leave on scalp for 5 minutes the rinse. Use 2-3 times weekly. 120 mL 6    lidocaine-prilocaine (EMLA) 2.5-2.5 % external cream Apply to affected area twice daily PRN pain 30 g 5    Magnesium Citrate 200 MG TABS       metroNIDAZOLE (METROCREAM) 0.75 % external cream Apply twice daily to face. 45 g 5    polyethylene glycol (MIRALAX) 17 g packet       progesterone (PROMETRIUM) 100 MG capsule Take 1 capsule every day by oral route for 30 days.      TYRVAYA 0.03 MG/ACT nasal spray       vitamin D3 (CHOLECALCIFEROL) 2000 units (50 mcg) tablet Take 1 tablet by mouth daily      XIIDRA 5 % opthalmic solution INSTILL 1 DROP INTO EACH EYE TWICE DAILY AS DIRECTED      zinc gluconate 50 MG tablet        No current facility-administered medications for this visit.     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            Objective:   Physical Exam:    /52   Pulse 78   There is no height or weight on file to calculate BMI.      General: Alert and oriented with normal affect. Attention, knowledge, memory, and language are intact. No acute distress.   Eyes: Sclerae are clear.  Respirations: Unlabored. CV: No lower extremity edema.  Skin: No rashes seen.  No specific  Gait:  Nonantalgic  Positive KWESI test bilaterally for hip pain decreased range of motion external rotation.  SI pain and thigh thrust Kwesi's, mild positive Gaenslen's on the right.  Sensation is intact to light touch throughout the  lower extremities.  Reflexes are 2+ patellar and Achilles     Manual muscle testing reveals:  Right /Left out of 5     5/5 hip flexors  5/5 knee flexors  5/5 knee extensors  5/5 ankle plantar flexors  5/5 ankle dorsiflexors  5/5  EHL

## 2025-01-06 NOTE — LETTER
1/6/2025      Shala Morales  [address] [address]      Dear Colleague,    Thank you for referring your patient, Shala Morales, to the [facility] SPINE AND NEUROSURGERY. Please see a copy of my visit note below.    Assessment/Plan:      Shala was seen today for hip pain.    Diagnoses and all orders for this visit:    Hip pain, left  -     Orthopedic  Referral; Future    Hip impingement syndrome, left  -     Orthopedic  Referral; Future    Sacroiliitis (H)    Osteoarthritis of both hips, unspecified osteoarthritis type  -     Orthopedic  Referral; Future    Lumbar radicular pain    Foraminal stenosis of lumbar region         Assessment: Pleasant 49 year old female with a history of hypertension, depression, hypothyroidism, history of alcohol use with:     1.  Severe left groin pain greater than right consistent with left hip osteoarthritis.  Has some right hip decreased range of motion as well.  She has decreased internal and external rotation and positive fair test and BAO test for hip pain I suspect her referred pain to the groin and pelvic region is likely related to hip osteoarthritis and there is likely tearing of the anterior superior portion of the labrum.  75 to 100% improved with deep hip pain following left hip injection under ultrasound.  Still having some right sided iliac crest pain some right sided hip pain as well and decreased range of motion.  Working with pelvic floor therapy.     2.  Lumbar spine pain with right greater than left lower extremity radicular pain in an S1 distribution.  History of this happening in the past as well this current episode for a few months.  New MRI shows relatively mild to moderate degenerative changes about the lumbar spine without any high-grade central canal or foraminal stenosis no compression of the S1 nerves.  MRI of the pelvis shows inflammatory changes of the right SI joint which can be  consistent with asymmetric sacroiliitis and this could represent pseudo radiculopathy.  There is also severe right moderate left foraminal stenosis L5-S1 which may contribute to some of the right lower extremity radicular symptoms.   No improvement with physical therapy.         2.  Myofascial pain lumbar spine and gluteal region.     3.  Chronic pelvic pain been diagnosed with potential pudendal neuralgia in the past but this is basically in the left greater than right groin anterior pelvic region as well as in the pelvis itself bilaterally.            Discussion:    1.  We discussed the diagnosis and treatment options.  We discussed options of further injection such as SI joint along with orthopedic evaluation medications and continued home exercise.    2.   Recommend orthopedic evaluation and to discuss potential arthroscopic surgery for labral tear and hip impingement versus hip replacement or other surgical options.  She would like to stay close to home near [place] will refer to [facility] orthopedics in [facility] Doctor Joao.    3.  Continue with pelvic floor PT.    4.  I did provide some diclofenac for her.  We discussed option of changing this to meloxicam once a day but she will try taking diclofenac to see if it is helpful and if not we will prescribe meloxicam trial.    5.  Follow-up with me as needed after seeing orthopedics.    It was our pleasure caring for your patient today, if there any questions or concerns please do not hesitate to contact us.    Over 33 minutes were spent on the date of the encounter performing chart review, patient visit and documentation in addition to any procedure.    Subjective:   Patient ID: Shala Morales is a 49 year old female.    History of Present Illness: Patient presents for follow-up of left hip pain after left hip injection.  Some improvement right away with local anesthetic and then slow improvement up to 75 to 100% improved in the deep hip pain  resolving some left groin pain and very tender on the hip flexors pubic symphysis working with pelvic floor therapy which is very helpful working on hip stretches but still the Kwesi test stretch is very painful bilaterally having some right-sided low back pain at the PSIS along the iliac crest as well.  Pain is 7/10 at worst 5/10 today 2/10 at best.  Having some right lateral knee pain and ankle pain intermittently as well.  Her pain in general is worse with not movement or prolonged sitting better with heat stretches and gentle exercise.  Still on Cymbalta 60 mg daily underwent the next steps are.  Has not specifically tried diclofenac as of yet.      Imaging: MRI of the left hip from November reviewed showing no full-thickness cartilage loss mild tendinopathy of the gluteus minimus and gluteus medius tendon there is spurring of the femoral head neck junction.  Likely tearing of the anterior superior labrum.  I reviewed MRI of the pelvis as well showing some right sacroiliitis/inflammatory changes along with left greater than right osteoarthritis and hip spurring.    Lumbar MRI reveals moderate right foraminal stenosis L5-S1 no high-grade central stenosis or other foraminal stenosis.      Review of Systems: Complains of weakness.  Denies numbness tingling bowel or bladder incontinence, headaches, swallowing issues fevers or unintentional weight loss.             Current Outpatient Medications   Medication Sig Dispense Refill     cetirizine (ZYRTEC) 10 MG tablet Take 1 tablet by mouth daily       COMPOUNDED NON-CONTROLLED SUBSTANCE (CMPD RX) - PHARMACY TO MIX COMPOUNDED MEDICATION Amitriptyline 2%, gabapentin 6%, lidocaine 5% - Apply topically to the vulva, 0.5-1g per application, 3-4x per day as needed. 60 g 2     crisaborole (EUCRISA) 2 % ointment Apply twice daily to eyelid eczema. 60 g 3     Cyanocobalamin (VITAMIN B 12 PO)        diazepam 10 mg SUPP Place vaginally or rectally daily as needed. (Patient not  taking: Reported on 10/30/2024) 10 suppository 3     diclofenac (VOLTAREN) 50 MG EC tablet TAKE 1 TABLET (50 MG) BY MOUTH 2 TIMES DAILY AS NEEDED FOR MODERATE PAIN. 60 tablet 1     CHANCE 0.0375 MG/24HR BIW patch APPLY 1 PATCH TO SKIN TWICE WEEKLY       DULoxetine (CYMBALTA) 60 MG capsule Take 1 capsule (60 mg) by mouth daily. - dose increase 10/3/24 90 capsule 2     DULoxetine (CYMBALTA) 60 MG capsule Take 1 capsule (60 mg) by mouth At Bedtime 90 capsule 2     estradiol (ESTRACE) 0.1 MG/GM vaginal cream APPLY SMALL AMOUNT TO AFFECTED AREA DAILY FOR 2 WEEKS       ketoconazole (NIZORAL) 2 % external cream Apply once to twice daily to eyebrows/eyelids. 30 g 4     ketoconazole (NIZORAL) 2 % external shampoo Leave on scalp for 5 minutes the rinse. Use 2-3 times weekly. 120 mL 6     lidocaine-prilocaine (EMLA) 2.5-2.5 % external cream Apply to affected area twice daily PRN pain 30 g 5     Magnesium Citrate 200 MG TABS        metroNIDAZOLE (METROCREAM) 0.75 % external cream Apply twice daily to face. 45 g 5     polyethylene glycol (MIRALAX) 17 g packet        progesterone (PROMETRIUM) 100 MG capsule Take 1 capsule every day by oral route for 30 days.       TYRVAYA 0.03 MG/ACT nasal spray        vitamin D3 (CHOLECALCIFEROL) 2000 units (50 mcg) tablet Take 1 tablet by mouth daily       XIIDRA 5 % opthalmic solution INSTILL 1 DROP INTO EACH EYE TWICE DAILY AS DIRECTED       zinc gluconate 50 MG tablet        No current facility-administered medications for this visit.     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           Objective:   Physical Exam:    /52   Pulse 78   There is no height or weight on file to calculate BMI.      General: Alert and oriented with normal affect. Attention, knowledge, memory, and language are intact. No acute distress.   Eyes: Sclerae are clear.  Respirations: Unlabored. CV: No lower extremity edema.  Skin: No rashes seen.  No specific  Gait:  Nonantalgic  Positive KWESI test bilaterally for hip pain decreased range of motion external rotation.  SI pain and thigh thrust Kwesi's, mild positive Gaenslen's on the right.  Sensation is intact to light touch throughout the  lower extremities.  Reflexes are 2+ patellar and Achilles     Manual muscle testing reveals:  Right /Left out of 5     5/5 hip flexors  5/5 knee flexors  5/5 knee extensors  5/5 ankle plantar flexors  5/5 ankle dorsiflexors  5/5  EHL        Again, thank you for allowing me to participate in the care of your patient.        Sincerely,        Santhosh Peck DO    Electronically signed

## 2025-01-07 ENCOUNTER — THERAPY VISIT (OUTPATIENT)
Dept: PHYSICAL THERAPY | Facility: CLINIC | Age: 50
End: 2025-01-07
Payer: COMMERCIAL

## 2025-01-07 ENCOUNTER — PATIENT OUTREACH (OUTPATIENT)
Dept: CARE COORDINATION | Facility: CLINIC | Age: 50
End: 2025-01-07

## 2025-01-07 DIAGNOSIS — R10.2 PELVIC PAIN IN FEMALE: Primary | ICD-10-CM

## 2025-01-07 PROCEDURE — 97140 MANUAL THERAPY 1/> REGIONS: CPT | Mod: GP | Performed by: PHYSICAL THERAPIST

## 2025-01-07 PROCEDURE — 97110 THERAPEUTIC EXERCISES: CPT | Mod: GP | Performed by: PHYSICAL THERAPIST

## 2025-01-07 PROCEDURE — 97139 UNLISTED THERAPEUTIC PX: CPT | Mod: GP | Performed by: PHYSICAL THERAPIST

## 2025-01-09 ENCOUNTER — PATIENT OUTREACH (OUTPATIENT)
Dept: CARE COORDINATION | Facility: CLINIC | Age: 50
End: 2025-01-09
Payer: COMMERCIAL

## 2025-01-21 ENCOUNTER — MYC MEDICAL ADVICE (OUTPATIENT)
Dept: FAMILY MEDICINE | Facility: CLINIC | Age: 50
End: 2025-01-21

## 2025-01-27 ENCOUNTER — OFFICE VISIT (OUTPATIENT)
Dept: OBGYN | Facility: CLINIC | Age: 50
End: 2025-01-27
Payer: COMMERCIAL

## 2025-01-27 VITALS — HEART RATE: 90 BPM | SYSTOLIC BLOOD PRESSURE: 143 MMHG | DIASTOLIC BLOOD PRESSURE: 85 MMHG | OXYGEN SATURATION: 98 %

## 2025-01-27 DIAGNOSIS — N92.0 MENORRHAGIA WITH REGULAR CYCLE: Primary | ICD-10-CM

## 2025-01-27 PROCEDURE — 99203 OFFICE O/P NEW LOW 30 MIN: CPT | Performed by: OBSTETRICS & GYNECOLOGY

## 2025-01-27 NOTE — PROGRESS NOTES
This 48 y/o female, , LMP 2025, presents as a new patient to the [place] gyn dept c/o episodes of heavy vaginal bleeding which she is not sure are periods or medication-related.  She is currently using the Elizabet 0.0375 biweekly patch coupled with Prometrium 100 mg po for HRT and questioned whether or not this was aggravating her constipation and bloating issues.  She was experiencing pelvic along with vaginal pain starting in  so the Estrace cream followed by HRT appears to have helped resolve these issues.  However, during her last menses earlier this month she passed a large clot which actually pushed out her tampon so she would like an evaluation for this and overall heavier uterine bleeding.  She has not had a pelvic US nor tissue sampling to-date so we discussed the probable need for both in the future.  BP (!) 143/85   Pulse 90   LMP 01/15/2025   SpO2 98%   Breastfeeding No   ROS:  10 systems were reviewed and the positives were listed under problems.  PE:  General- Healthy-appearing female in no acute distress  Eyes- No scleral injection or icterus  ENT- Mucus membranes moist  CV- No noticeable edema in extremities  Lymph- No noticeable cervical adenopathy  Respiratory- Non-labored breathing  Skin- No suspicious lesions or rashes visualized  Psych- Normal affect  Assessment - Menorrhagia, HRT, Hx of Pelvic and Vaginal Pain  Plan - Will start with checking a pelvic US and then decide if tissue sampling is needed and via which route - an endometrial bx versus operative hysteroscopy.  In the interim, she is to continue taking her HRT but may need to increase her Prometrium dose to 200 mg daily po.  If she continues to experience bloating and constipation issues, then she is to follow up with GI but so far their work up has not revealed any problems.  All her questions and concerns were addressed.  30 minutes were spent today in chart review, the patient visit, review of tests, and documentation  in regard to the issues noted above.

## 2025-01-27 NOTE — PATIENT INSTRUCTIONS
If you have labs or imaging done, the results will automatically release in [employer] without an interpretation.  Your health care professional will review those results and send an interpretation with recommendations as soon as possible, but this may be 1-3 business days.    If you have any questions regarding your visit, please contact your care team.     [employer] [facility]: [phone]  [facility] CLINIC HOURS TELEPHONE NUMBER   DO. Jacey Steinberg -Surgery Scheduler  Jessica -     WENDY Sena RN Kylie, RN [place]    Monday 8:30 am-5:00 pm  Wednesday 8:30 am-5:00 pm  Friday 8:30 am-5:00 pm    Typical Surgery day: Tuesday [facility]  [address] [place] [address]  Phone:  [phone]  Fax: [phone]   Appointment [phone]    Imaging Scheduling-All Locations [phone]    [facility] Labor and Delivery  [address] [place] [address] [place] [address] [phone]   **Surgeries** Our Surgery Schedulers will contact you to schedule. If you do not receive a call within 3 business days, please call [phone].  Urgent Care locations:  [facility] Monday-Friday   10 am - 8 pm  Saturday and    9 am - 5 pm [phone]   If you need a medication refill, please contact your pharmacy. Please allow 3 business days for your refill to be completed.  As always, Thank you for trusting us with your healthcare needs!  see additional instructions from your care team below

## 2025-01-29 ENCOUNTER — ANCILLARY PROCEDURE (OUTPATIENT)
Dept: ULTRASOUND IMAGING | Facility: CLINIC | Age: 50
End: 2025-01-29
Attending: OBSTETRICS & GYNECOLOGY
Payer: COMMERCIAL

## 2025-01-29 DIAGNOSIS — N92.0 MENORRHAGIA WITH REGULAR CYCLE: ICD-10-CM

## 2025-01-29 PROCEDURE — 76830 TRANSVAGINAL US NON-OB: CPT | Performed by: RADIOLOGY

## 2025-01-29 PROCEDURE — 76856 US EXAM PELVIC COMPLETE: CPT | Performed by: RADIOLOGY

## 2025-01-31 ENCOUNTER — MYC MEDICAL ADVICE (OUTPATIENT)
Dept: OBGYN | Facility: CLINIC | Age: 50
End: 2025-01-31
Payer: COMMERCIAL

## 2025-02-10 ENCOUNTER — PATIENT OUTREACH (OUTPATIENT)
Dept: CARE COORDINATION | Facility: CLINIC | Age: 50
End: 2025-02-10
Payer: COMMERCIAL

## 2025-02-18 ENCOUNTER — THERAPY VISIT (OUTPATIENT)
Dept: PHYSICAL THERAPY | Facility: CLINIC | Age: 50
End: 2025-02-18
Payer: COMMERCIAL

## 2025-02-18 DIAGNOSIS — R10.2 PELVIC PAIN IN FEMALE: Primary | ICD-10-CM

## 2025-02-18 PROCEDURE — 97140 MANUAL THERAPY 1/> REGIONS: CPT | Mod: GP | Performed by: PHYSICAL THERAPIST

## 2025-02-18 PROCEDURE — 97110 THERAPEUTIC EXERCISES: CPT | Mod: GP | Performed by: PHYSICAL THERAPIST

## 2025-02-19 NOTE — PROGRESS NOTES
AUDIOLOGY REPORT    SUBJECTIVE:  Shala Morales is a 49 year old female who was seen on 2/24/25 in the Audiology Clinic at the [facility] for audiologic evaluation, self-referred.      The patient reports tinnitus for over 1 year that is constant and seems worse in left ear compared to right ear.  For the last few months, she also notes intermittent ear fullness, which she believes is also worse in left ear compared to right ear.  Shala denies hearing changes, ear pain, drainage from ears, dizziness, history of ear surgery, or history of noise expousre.  She has motion sickness, which seems to be worsening.  She is undergoing hormone replacement therapy.  Her ear symptoms did not coincide with starting this.      OBJECTIVE:  Abuse Screening:  Do you feel unsafe at home or work/school? No  Do you feel threatened by someone? No  Does anyone try to keep you from having contact with others, or doing things outside of your home? No  Physical signs of abuse present? No     Fall Risk Screen:  1. Have you fallen two or more times in the past year? No  2. Have you fallen and had an injury in the past year? No    Timed Up and Go Score (in seconds): not tested  Is patient a fall risk? No  Referral initiated: No  Fall Risk Assessment Completed by Audiology    Otoscopic exam indicates ears are clear of cerumen bilaterally.      Pure Tone Thresholds assessed using conventional audiometry with good reliability from 250-8000 Hz bilaterally using insert earphones and circumaural headphones.     RIGHT:  Normal hearing.     LEFT:  Normal hearing sloping to moderate/mild sensorineural hearing loss at 750-1500 Hz, rising back to normal/borderline normal hearing at 2000 Hz and above.  NOTE: Left ear is 10-35 dB poorer than right ear from 500-2000 Hz and 10 dB poorer than right ear at 4221-6318 Hz. Negative Bridget     Tympanogram:    RIGHT: normal eardrum mobility    LEFT:   normal  eardrum mobility    Reflexes (reported by stimulus ear):  RIGHT: Ipsilateral is present at normal levels  RIGHT: Contralateral is present at normal levels  LEFT:   Ipsilateral is present at normal levels  LEFT:   Contralateral is elevated at frequencies tested      Speech Reception Threshold:    RIGHT: 10 dB HL    LEFT:   25 dB HL  Word Recognition Score:     RIGHT: 100% at 50 dB HL using NU-6 recorded word list.    LEFT:   96% at 65 dB HL using NU-6 recorded word list.      ASSESSMENT:   Sensorineural hearing loss in left ear with unrestricted hearing in right ear.   Tinnitus and aural fullness are also worse in left ear compared to right ear.     Today s results were discussed with the patient in detail.     PLAN:     ENT consult due to asymmetrical hearing loss (left ear worse than right ear) along with left tinnitus and left aural fullness.    Recheck hearing at intervals advised by ENT.      The patient expressed understanding and agreement with this plan.    Mathew Tellez, CCC-A, [facility]  Licensed Audiologist  [place] #[id number]

## 2025-02-24 ENCOUNTER — OFFICE VISIT (OUTPATIENT)
Dept: AUDIOLOGY | Facility: CLINIC | Age: 50
End: 2025-02-24
Payer: COMMERCIAL

## 2025-02-24 DIAGNOSIS — H90.42 SENSORINEURAL HEARING LOSS (SNHL) OF LEFT EAR WITH UNRESTRICTED HEARING OF RIGHT EAR: Primary | ICD-10-CM

## 2025-02-28 NOTE — PATIENT INSTRUCTIONS
If you have labs or imaging done, the results will automatically release in [employer] without an interpretation.  Your health care professional will review those results and send an interpretation with recommendations as soon as possible, but this may be 1-3 business days.    If you have any questions regarding your visit, please contact your care team.     [facility]: [phone]  [facility] CLINIC HOURS TELEPHONE NUMBER   DO. Jacey Steinberg -Surgery Scheduler  Jessica -     WENDY Sena RN Kylie, RN [place]    Monday 8:30 am-5:00 pm  Wednesday 8:30 am-5:00 pm  Friday 8:30 am-5:00 pm    Typical Surgery day: Tuesday [facility]  [address] [place] [address]  Phone:  [phone]  Fax: [phone]   Appointment [phone]    Imaging Scheduling-All Locations [phone]    [facility] Labor and Delivery  [address] [place] [address] [place] [address] [phone]   **Surgeries** Our Surgery Schedulers will contact you to schedule. If you do not receive a call within 3 business days, please call [phone].  Urgent Care locations:  [facility] Monday-Friday   10 am - 8 pm  Saturday and    9 am - 5 pm [phone]   If you need a medication refill, please contact your pharmacy. Please allow 3 business days for your refill to be completed.  As always, Thank you for trusting us with your healthcare needs!  see additional instructions from your care team below

## 2025-03-03 ENCOUNTER — OFFICE VISIT (OUTPATIENT)
Dept: OBGYN | Facility: CLINIC | Age: 50
End: 2025-03-03
Payer: COMMERCIAL

## 2025-03-03 VITALS — HEART RATE: 61 BPM | OXYGEN SATURATION: 97 % | SYSTOLIC BLOOD PRESSURE: 132 MMHG | DIASTOLIC BLOOD PRESSURE: 84 MMHG

## 2025-03-03 DIAGNOSIS — N92.0 MENORRHAGIA WITH REGULAR CYCLE: Primary | ICD-10-CM

## 2025-03-03 DIAGNOSIS — Z32.02 NEGATIVE PREGNANCY TEST: ICD-10-CM

## 2025-03-03 DIAGNOSIS — N92.4 ABNORMAL PERIMENOPAUSAL BLEEDING: ICD-10-CM

## 2025-03-03 DIAGNOSIS — N89.8 VAGINAL DRYNESS: ICD-10-CM

## 2025-03-03 LAB
HCG UR QL: NEGATIVE
INTERNAL QC OK POCT: NORMAL
POCT KIT EXPIRATION DATE: NORMAL
POCT KIT LOT NUMBER: NORMAL

## 2025-03-03 PROCEDURE — 3079F DIAST BP 80-89 MM HG: CPT | Performed by: OBSTETRICS & GYNECOLOGY

## 2025-03-03 PROCEDURE — 3075F SYST BP GE 130 - 139MM HG: CPT | Performed by: OBSTETRICS & GYNECOLOGY

## 2025-03-03 PROCEDURE — 81025 URINE PREGNANCY TEST: CPT | Performed by: OBSTETRICS & GYNECOLOGY

## 2025-03-03 PROCEDURE — 58100 BIOPSY OF UTERUS LINING: CPT | Performed by: OBSTETRICS & GYNECOLOGY

## 2025-03-03 PROCEDURE — 99213 OFFICE O/P EST LOW 20 MIN: CPT | Mod: 25 | Performed by: OBSTETRICS & GYNECOLOGY

## 2025-03-03 RX ORDER — ESTRADIOL 0.1 MG/G
2 CREAM VAGINAL
Qty: 42.5 G | Refills: 3 | Status: SHIPPED | OUTPATIENT
Start: 2025-03-03

## 2025-03-03 NOTE — PROGRESS NOTES
This 50 y/o female, , LMP 2025, presents for an endometrial biopsy due to a hx of menorrhagia while taking HRT.  She currently uses the Elizabet 0.0375 biweekly patch coupled with Prometrium 100 mg po and requests a script for Estrace vaginal cream due to symptomatic vaginal dryness.  She had a recent pelvic US on 2025 and her uterus measured 4.2 x 8 x 5.1 cm with a 6 mm stripe so tissue sampling was advised since she has never had this checked.  She states that she took 800 mg of Ibuprofen po about one hour ago and informed consent was reviewed and obtained.  /84   Pulse 61   LMP 01/15/2025   SpO2 97%   Breastfeeding No   ROS:  10 systems were reviewed and the positives were listed under problems.  Her preprocedural UPT result was negative today and she denies any risk of pregnancy exposure.  In the lithotomy position, a bi-valve speculum was placed and her cervix was cleansed with betadine x 3 swabs.  The 12 o'clock position of her cervix was grasped with a single-toothed tenaculum and her cervical os was dilated using an OsFinder.  The pipelle was then inserted and two swipes were made without difficulty and the tissue specimen was submitted to pathology.  She tolerated the procedure well and there were no complications.  EBL was 1 mL and all instruments were removed with a normal count noted.  Follow up care and instructions were reviewed with the patient.  Assessment - Endometrial biopsy due to her hx of menorrhagia while taking HRT, vaginal dryness  Plan - Submit the endometrial biopsy to pathology and treat if abnormal.  However, if these results are normal, then the plan is to increase her Prometrium dose from 100 mg to 200 mg daily po.  She requests a script for Estrace vaginal cream 0.1 mg to be used topically intravaginally twice per week so this was sent to her listed pharmacy.  All her questions and concerns were addressed.  20 minutes were spent today in chart review, the patient  visit, review of tests, and documentation in regard to the issues noted above.  This did NOT include the time spent in the procedure (endometrial biopsy).

## 2025-03-18 ENCOUNTER — MYC MEDICAL ADVICE (OUTPATIENT)
Dept: OBGYN | Facility: CLINIC | Age: 50
End: 2025-03-18
Payer: COMMERCIAL

## 2025-03-18 NOTE — TELEPHONE ENCOUNTER
"Per pt's 3/3/25 EMB result note:  \"Your recent uterine biopsy showed focal hyperplasia without atypia so that is an abnormal finding and needs to be treated.  The choices would include insertion of a Mirena IUD or oral Prometrium 200 mg daily.  Please let me know your preference of you can make a clinic appointment to discuss in more detail.\"    Pt has an HRT specialist through [facility].  She had her view the results from her EMB with Dr. Méndez.  She wanted her current HRT provider be updated.    Her HRT specialist at  \"felt the ULS /biospy were normal and that i was just having normal cycled bleeding since bleeding has been about 2.5-4weeks apart. Felt I could either decrease estrogen patch ( cause I m producing enough of my own) or increase Progesterone to 200 mg like you mentioned\".    Pt had decided to increase her progesterone to 200 mg daily and her HRT specialist will order this for her.    However, pt is asking Dr. Méndez to explain the \"abnormal findings\" a bit more on her recent EMB.    Routing to Dr. Méndez to see if she can further explain why her recent EMB was an abnormal finding.    Jaida Cruz, RN-[facility]      "

## 2025-03-19 NOTE — TELEPHONE ENCOUNTER
I called this patient and left a message on her phone recorder since she did not .  I explained that her pathology dx was not normal since it showed hyperplasia without atypia and increasing her progesterone intake to 200 mg daily would be the best next step.  I also mailed her an article on endometrial hyperplasia from [employer] and the patient is to call or return if she has any further questions.
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
## 2025-03-31 ENCOUNTER — OFFICE VISIT (OUTPATIENT)
Dept: OTOLARYNGOLOGY | Facility: CLINIC | Age: 50
End: 2025-03-31
Payer: COMMERCIAL

## 2025-03-31 VITALS
TEMPERATURE: 97.5 F | SYSTOLIC BLOOD PRESSURE: 130 MMHG | BODY MASS INDEX: 29.53 KG/M2 | OXYGEN SATURATION: 99 % | HEART RATE: 78 BPM | WEIGHT: 200 LBS | DIASTOLIC BLOOD PRESSURE: 85 MMHG

## 2025-03-31 DIAGNOSIS — H90.3 SNHL (SENSORY-NEURAL HEARING LOSS), ASYMMETRICAL: Primary | ICD-10-CM

## 2025-03-31 PROCEDURE — 3079F DIAST BP 80-89 MM HG: CPT

## 2025-03-31 PROCEDURE — 3075F SYST BP GE 130 - 139MM HG: CPT

## 2025-03-31 PROCEDURE — 1126F AMNT PAIN NOTED NONE PRSNT: CPT

## 2025-03-31 PROCEDURE — 99202 OFFICE O/P NEW SF 15 MIN: CPT

## 2025-03-31 ASSESSMENT — PAIN SCALES - GENERAL: PAINLEVEL_OUTOF10: NO PAIN (0)

## 2025-03-31 NOTE — PATIENT INSTRUCTIONS
You were seen by Mumtaz Bassett CNP.  If you have questions or concerns regarding your appointment today, you can reach out to our call center at [phone].  The following has been recommended at your appointment today:    TINNITUS EDUCATION: (Ringing in the Ear)    Tinnitus related to hearing occurs when there is damage to the hair cells. This means the the brain generates a ringing in the ear due to the damage hair cells because they are no longer standing. This is called the threshold shift. Threshold Shift - Experiencing a threshold shift causes damage to the hair cells in your ears. When they are damaged instead of standing up they remain bend over, even if there is sound. When hair cells remain bent over it can cause a felling of fullness, ringing in the ear or possible temporary hearing loss.         Hair cells are found in the Cochlea        Other Conditions that can cause Tinnitus:   Neck tension  Headaches  Clenching or Grinding teeth  Temporomandibular Joint Dysfunction (TMJ)  Stress, Depression, Anxiety  Blood pressure.   Vessels in the head and neck.     This occurs due to the tension or tight muscles.     Things that can decrease the Tinnitus:   Monitor salt, caffeine, alcohol, dark chocolate intake. Decrease intake to see if symptoms improve.   Decrease use of Aspirin, IBU, and Aleve.   Continue with meditation to help maintain stress, anxiety, or depression symptoms.     Things that can help the Tinnitus if it bothers you at night/decrease the tinnitus:   May use a sound machine, fan, or sound forrest on your smart phone during sleep. (Called white noise)  Could order an MRI for further evaluation to make sure there is nothing growing on the hearing nerve. However, your word recognition is currently at 100% right and 96% left.  (Test you are asked to repeat the words back during the hearing test.)  Hearing Aids    Most over the counter things to help Tinnitus do not work.     Other things that may  help:   Pillow Masker - Sound Oasis (Pillow that has a speaker built in. You can Google this and find a variety of pillows with speakers.)  Cognitive Behavioral Therapy - Send me a [employer] message if you are interested in this. Usually, insurance does not cover this type of service. Therefore, you mostly will have to pay out of pocket.   OTC Medications you could try include Arches Tinnitus Formula or Lipo Flavonoid. This is an over the counter supplement of B vitamins (B1-B12).
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
## 2025-03-31 NOTE — NURSING NOTE
"Initial /85   Pulse 78   Temp 97.5  F (36.4  C)   Wt 90.7 kg (200 lb)   SpO2 99%   BMI 29.53 kg/m   Estimated body mass index is 29.53 kg/m  as calculated from the following:    Height as of 11/25/24: 1.753 m (5' 9\").    Weight as of this encounter: 90.7 kg (200 lb). .  Gissell Reed MA on 3/31/2025 at 10:59 AM    "

## 2025-04-06 SDOH — HEALTH STABILITY: PHYSICAL HEALTH: ON AVERAGE, HOW MANY DAYS PER WEEK DO YOU ENGAGE IN MODERATE TO STRENUOUS EXERCISE (LIKE A BRISK WALK)?: 4 DAYS

## 2025-04-06 SDOH — HEALTH STABILITY: PHYSICAL HEALTH: ON AVERAGE, HOW MANY MINUTES DO YOU ENGAGE IN EXERCISE AT THIS LEVEL?: 40 MIN

## 2025-04-06 ASSESSMENT — SOCIAL DETERMINANTS OF HEALTH (SDOH): HOW OFTEN DO YOU GET TOGETHER WITH FRIENDS OR RELATIVES?: ONCE A WEEK

## 2025-04-06 ASSESSMENT — PATIENT HEALTH QUESTIONNAIRE - PHQ9
10. IF YOU CHECKED OFF ANY PROBLEMS, HOW DIFFICULT HAVE THESE PROBLEMS MADE IT FOR YOU TO DO YOUR WORK, TAKE CARE OF THINGS AT HOME, OR GET ALONG WITH OTHER PEOPLE: NOT DIFFICULT AT ALL
SUM OF ALL RESPONSES TO PHQ QUESTIONS 1-9: 1
SUM OF ALL RESPONSES TO PHQ QUESTIONS 1-9: 1

## 2025-04-07 ENCOUNTER — OFFICE VISIT (OUTPATIENT)
Dept: FAMILY MEDICINE | Facility: CLINIC | Age: 50
End: 2025-04-07
Payer: COMMERCIAL

## 2025-04-07 VITALS
DIASTOLIC BLOOD PRESSURE: 76 MMHG | WEIGHT: 210.6 LBS | RESPIRATION RATE: 18 BRPM | SYSTOLIC BLOOD PRESSURE: 104 MMHG | HEIGHT: 69 IN | OXYGEN SATURATION: 98 % | HEART RATE: 93 BPM | BODY MASS INDEX: 31.19 KG/M2 | TEMPERATURE: 97.2 F

## 2025-04-07 DIAGNOSIS — Z00.00 ENCOUNTER FOR ROUTINE ADULT HEALTH EXAMINATION WITHOUT ABNORMAL FINDINGS: Primary | ICD-10-CM

## 2025-04-07 DIAGNOSIS — G58.8 PUDENDAL NEURALGIA: ICD-10-CM

## 2025-04-07 DIAGNOSIS — F10.21 HISTORY OF ALCOHOLISM (H): ICD-10-CM

## 2025-04-07 DIAGNOSIS — J84.10 LUNG GRANULOMA (H): ICD-10-CM

## 2025-04-07 PROBLEM — Z86.79 HISTORY OF HYPERTENSION: Status: RESOLVED | Noted: 2018-04-17 | Resolved: 2025-04-07

## 2025-04-07 PROBLEM — E78.5 HYPERLIPIDEMIA LDL GOAL <130: Status: RESOLVED | Noted: 2019-04-23 | Resolved: 2025-04-07

## 2025-04-07 PROBLEM — Z78.9 VEGETARIAN DIET: Status: ACTIVE | Noted: 2019-04-23

## 2025-04-07 LAB
EST. AVERAGE GLUCOSE BLD GHB EST-MCNC: 105 MG/DL
HBA1C MFR BLD: 5.3 % (ref 0–5.6)
HGB BLD-MCNC: 12.7 G/DL (ref 11.7–15.7)

## 2025-04-07 PROCEDURE — 83036 HEMOGLOBIN GLYCOSYLATED A1C: CPT | Performed by: PHYSICIAN ASSISTANT

## 2025-04-07 PROCEDURE — 36415 COLL VENOUS BLD VENIPUNCTURE: CPT | Performed by: PHYSICIAN ASSISTANT

## 2025-04-07 PROCEDURE — 82728 ASSAY OF FERRITIN: CPT | Performed by: PHYSICIAN ASSISTANT

## 2025-04-07 PROCEDURE — 3074F SYST BP LT 130 MM HG: CPT | Performed by: PHYSICIAN ASSISTANT

## 2025-04-07 PROCEDURE — 85018 HEMOGLOBIN: CPT | Performed by: PHYSICIAN ASSISTANT

## 2025-04-07 PROCEDURE — 99396 PREV VISIT EST AGE 40-64: CPT | Performed by: PHYSICIAN ASSISTANT

## 2025-04-07 PROCEDURE — 3078F DIAST BP <80 MM HG: CPT | Performed by: PHYSICIAN ASSISTANT

## 2025-04-07 PROCEDURE — 82306 VITAMIN D 25 HYDROXY: CPT | Performed by: PHYSICIAN ASSISTANT

## 2025-04-07 RX ORDER — CHOLECALCIFEROL (VITAMIN D3) 50 MCG
1 TABLET ORAL DAILY
COMMUNITY
Start: 2025-04-07

## 2025-04-07 RX ORDER — PROGESTERONE 200 MG/1
200 CAPSULE ORAL DAILY
COMMUNITY
Start: 2025-04-07

## 2025-04-07 RX ORDER — LANOLIN ALCOHOL/MO/W.PET/CERES
50 CREAM (GRAM) TOPICAL DAILY
COMMUNITY
Start: 2023-07-24

## 2025-04-07 RX ORDER — CHLORAL HYDRATE 500 MG
CAPSULE ORAL
COMMUNITY
Start: 2023-07-24

## 2025-04-07 RX ORDER — DULOXETIN HYDROCHLORIDE 60 MG/1
60 CAPSULE, DELAYED RELEASE ORAL DAILY
Qty: 90 CAPSULE | Refills: 3 | Status: SHIPPED | OUTPATIENT
Start: 2025-04-07

## 2025-04-07 RX ORDER — MULTIVITAMIN
1 TABLET ORAL DAILY
COMMUNITY
Start: 2024-01-01

## 2025-04-07 RX ORDER — BACILLUS COAGULANS 1B CELL
1 CAPSULE ORAL EVERY OTHER DAY
COMMUNITY
Start: 2025-04-07

## 2025-04-07 NOTE — PROGRESS NOTES
"Preventive Care Visit  [facility] TRUDI Cervantes PA-C, Family Medicine  Apr 7, 2025      Assessment & Plan       ICD-10-CM    1. Encounter for routine adult health examination without abnormal findings  Z00.00 Hemoglobin A1c     Vitamin D Deficiency     Hemoglobin     Ferritin     Hemoglobin A1c     Vitamin D Deficiency     Hemoglobin     Ferritin      2. Pudendal neuralgia  G58.8 DULoxetine (CYMBALTA) 60 MG capsule      3. Lung granuloma (H)  J84.10       4. History of alcoholism (H)  F10.21           1) Screenings/preventative measures discussed    2) Med renewed, no changes    3,4) Stable, unchanged, no concerns      BMI  Estimated body mass index is 31.1 kg/m  as calculated from the following:    Height as of this encounter: 1.753 m (5' 9\").    Weight as of this encounter: 95.5 kg (210 lb 9.6 oz).     Counseling  Appropriate preventive services were addressed with this patient via screening, questionnaire, or discussion as appropriate for fall prevention, nutrition, physical activity, Tobacco-use cessation, social engagement, weight loss and cognition.  Checklist reviewing preventive services available has been given to the patient.  Reviewed patient's diet, addressing concerns and/or questions.     Return in about 1 year (around 4/7/2026) for your annual physical, fasting labs, with Rachel, in person.       Teri Last is a 50 year old, presenting for the following:  Physical        4/7/2025     2:22 PM   Additional Questions   Roomed by Luda   Accompanied by toney         4/7/2025     2:22 PM   Patient Reported Additional Medications   Patient reports taking the following new medications none          History of Present Illness           Advance Care Planning  Patient does not have a Health Care Directive: Discussed advance care planning with patient; information given to patient to review.      4/6/2025   General Health   How would you rate your overall physical health? Good "   Feel stress (tense, anxious, or unable to sleep) Only a little   (!) STRESS CONCERN      2025   Nutrition   Three or more servings of calcium each day? Yes   Diet: Vegetarian/vegan    Breakfast skipped   How many servings of fruit and vegetables per day? 4 or more   How many sweetened beverages each day? 0-1       Multiple values from one day are sorted in reverse-chronological order         2025   Exercise   Days per week of moderate/strenous exercise 4 days   Average minutes spent exercising at this level 40 min         2025   Social Factors   Frequency of gathering with friends or relatives Once a week   Worry food won't last until get money to buy more No   Food not last or not have enough money for food? No   Do you have housing? (Housing is defined as stable permanent housing and does not include staying ouside in a car, in a tent, in an abandoned building, in an overnight shelter, or couch-surfing.) Yes   Are you worried about losing your housing? No   Lack of transportation? No   Unable to get utilities (heat,electricity)? No         2025   Dental   Dentist two times every year? Yes         Today's PHQ-9 Score:       2025     9:31 PM   PHQ-9 SCORE   PHQ-9 Total Score MyChart 1 (Minimal depression)   PHQ-9 Total Score 1        Patient-reported         2025   Substance Use   Alcohol more than 3/day or more than 7/wk No   Do you use any other substances recreationally? No     Social History     Tobacco Use    Smoking status: Former     Current packs/day: 0.00     Average packs/day: 1 pack/day for 25.7 years (25.7 ttl pk-yrs)     Types: Cigarettes     Start date: 1994     Quit date: 10/1/2019     Years since quittin.5     Passive exposure: Past    Smokeless tobacco: Never    Tobacco comments:     Stopped smoking cigarettes  but then started using E cigarette which I then quit using    Vaping Use    Vaping status: Never Used   Substance Use Topics    Alcohol use: Not  Currently     Comment: Recovering alcoholic, sobriety date 3/21/2012    Drug use: Never           11/20/2024   LAST FHS-7 RESULTS   1st degree relative breast or ovarian cancer No   Any relative bilateral breast cancer No   Any male have breast cancer No   Any ONE woman have BOTH breast AND ovarian cancer No   Any woman with breast cancer before 50yrs No   2 or more relatives with breast AND/OR ovarian cancer No   2 or more relatives with breast AND/OR bowel cancer No        Mammogram Screening - Mammogram every 1-2 years updated in Health Maintenance based on mutual decision making        4/6/2025   STI Screening   New sexual partner(s) since last STI/HIV test? No     History of abnormal Pap smear: No - age 30- 64 PAP with HPV every 5 years recommended        Latest Ref Rng & Units 3/11/2024     2:34 PM 3/21/2021    12:08 PM   PAP / HPV   PAP  Negative for Intraepithelial Lesion or Malignancy (NILM)     HPV 16 DNA Negative Negative     HPV 18 DNA Negative Negative     Other HR HPV Negative Negative     PAP-ABSTRACT   See Scanned Document           This result is from an external source.     ASCVD Risk   The 10-year ASCVD risk score (Hair DK, et al., 2019) is: 0.5%    Values used to calculate the score:      Age: 50 years      Sex: Female      Is Non- : No      Diabetic: No      Tobacco smoker: No      Systolic Blood Pressure: 104 mmHg      Is BP treated: No      HDL Cholesterol: 59 mg/dL      Total Cholesterol: 158 mg/dL        4/6/2025   Contraception/Family Planning   Questions about contraception or family planning No        Reviewed and updated as needed this visit by Provider      Problems                 Review of Systems  Constitutional, neuro, ENT, endocrine, pulmonary, cardiac, gastrointestinal, genitourinary, musculoskeletal, integument and psychiatric systems are negative, except as otherwise noted.     Objective    Exam  /76   Pulse 93   Temp 97.2  F (36.2  C)  "(Temporal)   Resp 18   Ht 1.753 m (5' 9\")   Wt 95.5 kg (210 lb 9.6 oz)   LMP 04/01/2025   SpO2 98%   BMI 31.10 kg/m     Estimated body mass index is 31.1 kg/m  as calculated from the following:    Height as of this encounter: 1.753 m (5' 9\").    Weight as of this encounter: 95.5 kg (210 lb 9.6 oz).    Physical Exam  GENERAL: alert and no distress  EYES: Eyes grossly normal to inspection  HENT: ear canals and TM's normal, nose and mouth without ulcers or lesions  NECK: no adenopathy, no asymmetry, masses, or scars  RESP: lungs clear to auscultation - no rales, rhonchi or wheezes  CV: regular rates and rhythm, normal S1 S2, no S3 or S4, and no murmur, click or rub  ABDOMEN: soft, nontender, without hepatosplenomegaly or masses  MS: no gross musculoskeletal defects noted, no edema  SKIN: no suspicious lesions or rashes  NEURO: Normal strength and tone, mentation intact and speech normal  PSYCH: mentation appears normal, affect normal/bright        Signed Electronically by: Rachel Cervantes PA-C    "

## 2025-04-08 LAB
FERRITIN SERPL-MCNC: 34 NG/ML (ref 6–175)
VIT D+METAB SERPL-MCNC: 44 NG/ML (ref 20–50)

## 2025-04-29 ENCOUNTER — MYC MEDICAL ADVICE (OUTPATIENT)
Dept: OBGYN | Facility: CLINIC | Age: 50
End: 2025-04-29
Payer: COMMERCIAL

## 2025-06-26 NOTE — PATIENT INSTRUCTIONS
If you have labs or imaging done, the results will automatically release in [employer] without an interpretation.  Your health care professional will review those results and send an interpretation with recommendations as soon as possible, but this may be 1-3 business days.    If you have any questions regarding your visit, please contact your care team.     [facility]: [phone]  [facility] CLINIC HOURS TELEPHONE NUMBER   DO. Linda Steinberg -Surgery Scheduler  Jessica -     WENDY Sena RN Kylie, RN [place]    Monday 8:30 am-5:00 pm  Wednesday 8:30 am-5:00 pm  Friday 8:30 am-5:00 pm    Typical Surgery day: Tuesday [facility]  [address] [place] [address]  Phone:  [phone]  Fax: [phone]   Appointment [phone]    Imaging Scheduling-All Locations [phone]    [facility] Labor and Delivery  [address] [place] [address] [place] [address] [phone]   **Surgeries** Our Surgery Schedulers will contact you to schedule. If you do not receive a call within 3 business days, please call [phone].  Urgent Care locations:  [facility] Monday-Friday   10 am - 8 pm  Saturday and    9 am - 5 pm [phone]   If you need a medication refill, please contact your pharmacy. Please allow 3 business days for your refill to be completed.  As always, Thank you for trusting us with your healthcare needs!  see additional instructions from your care team below

## 2025-06-30 ENCOUNTER — OFFICE VISIT (OUTPATIENT)
Dept: OBGYN | Facility: CLINIC | Age: 50
End: 2025-06-30
Payer: COMMERCIAL

## 2025-06-30 VITALS
BODY MASS INDEX: 30.27 KG/M2 | SYSTOLIC BLOOD PRESSURE: 119 MMHG | HEART RATE: 59 BPM | WEIGHT: 205 LBS | DIASTOLIC BLOOD PRESSURE: 77 MMHG | OXYGEN SATURATION: 99 %

## 2025-06-30 DIAGNOSIS — N85.00 ENDOMETRIAL HYPERPLASIA WITHOUT ATYPIA: Primary | ICD-10-CM

## 2025-06-30 PROCEDURE — 99214 OFFICE O/P EST MOD 30 MIN: CPT | Performed by: OBSTETRICS & GYNECOLOGY

## 2025-06-30 PROCEDURE — 3074F SYST BP LT 130 MM HG: CPT | Performed by: OBSTETRICS & GYNECOLOGY

## 2025-06-30 PROCEDURE — 3078F DIAST BP <80 MM HG: CPT | Performed by: OBSTETRICS & GYNECOLOGY

## 2025-06-30 RX ORDER — PROGESTERONE 200 MG/1
200 CAPSULE ORAL DAILY
Qty: 90 CAPSULE | Refills: 3 | Status: SHIPPED | OUTPATIENT
Start: 2025-06-30

## 2025-06-30 NOTE — PROGRESS NOTES
This 51 y/o female, , LMP 2025, presents to discuss follow up for her hx of endometrial hyperplasia without atypia per bx on 3/3/2025.  She has been taking Prometrium 200 mg po daily coupled with the Elizabet estrogen 0.027 mg patch and prefers this combination but needs a refill of the Prometrium prior to her vacation.  She denies any vaginal spotting or bleeding but feels that when the patch wears off she experiences mood swings, hot flushes, joint pain, and hair loss.  /77   Pulse 59   Wt 93 kg (205 lb)   LMP 2025   SpO2 99%   Breastfeeding No   BMI 30.27 kg/m    ROS:  10 systems were reviewed and the positives were listed under problems.  PE:  General- Healthy-appearing female in no acute distress  Eyes- No scleral injection or icterus  ENT- Mucus membranes moist  CV- No noticeable edema in extremities  Lymph- No noticeable cervical adenopathy  Respiratory- Non-labored breathing  Skin- No suspicious lesions or rashes visualized  Psych- Normal affect  Assessment - Hx of endometrial hyperplasia without atypia  Plan - She was advised to return on 9/3/2025 for her next endometrial biopsy to confirm resolution of her endometrial hyperplasia.  If she has developed atypia, then she understands that a hysterectomy will be advised.  She is to take 600-800 mg of Ibuprofen one hour prior to the bx procedure for comfort.  A refill for her po Prometrium was sent to her listed pharmacy and all her questions and concerns were addressed.  30 minutes were spent today in chart review, the patient visit, review of tests, and documentation in regard to the issues noted above.

## 2025-07-15 ENCOUNTER — E-VISIT (OUTPATIENT)
Dept: FAMILY MEDICINE | Facility: CLINIC | Age: 50
End: 2025-07-15
Payer: COMMERCIAL

## 2025-07-15 DIAGNOSIS — M25.50 POLYARTHRALGIA: Primary | ICD-10-CM

## 2025-07-15 PROCEDURE — 99207 PR NON-BILLABLE SERV PER CHARTING: CPT | Performed by: PHYSICIAN ASSISTANT

## 2025-07-16 ENCOUNTER — PATIENT OUTREACH (OUTPATIENT)
Dept: CARE COORDINATION | Facility: CLINIC | Age: 50
End: 2025-07-16
Payer: COMMERCIAL

## 2025-09-01 ENCOUNTER — E-VISIT (OUTPATIENT)
Dept: FAMILY MEDICINE | Facility: CLINIC | Age: 50
End: 2025-09-01
Payer: COMMERCIAL

## 2025-09-01 DIAGNOSIS — R10.13 EPIGASTRIC PAIN: ICD-10-CM

## 2025-09-01 DIAGNOSIS — R10.11 RUQ ABDOMINAL PAIN: Primary | ICD-10-CM

## 2025-09-01 DIAGNOSIS — R14.0 ABDOMINAL BLOATING: ICD-10-CM

## (undated) DEVICE — SOL WATER IRRIG 1000ML BOTTLE 07139-09

## (undated) RX ORDER — LIDOCAINE HYDROCHLORIDE 20 MG/ML
JELLY TOPICAL
Status: DISPENSED
Start: 2021-04-14

## (undated) RX ORDER — LIDOCAINE HYDROCHLORIDE 20 MG/ML
JELLY TOPICAL
Status: DISPENSED
Start: 2022-02-21

## (undated) RX ORDER — SIMETHICONE 40MG/0.6ML
SUSPENSION, DROPS(FINAL DOSAGE FORM)(ML) ORAL
Status: DISPENSED
Start: 2022-02-21

## (undated) RX ORDER — FENTANYL CITRATE 50 UG/ML
INJECTION, SOLUTION INTRAMUSCULAR; INTRAVENOUS
Status: DISPENSED
Start: 2022-02-21

## (undated) RX ORDER — LIDOCAINE HYDROCHLORIDE 10 MG/ML
INJECTION, SOLUTION EPIDURAL; INFILTRATION; INTRACAUDAL; PERINEURAL
Status: DISPENSED
Start: 2023-10-03